# Patient Record
Sex: FEMALE | Race: WHITE
[De-identification: names, ages, dates, MRNs, and addresses within clinical notes are randomized per-mention and may not be internally consistent; named-entity substitution may affect disease eponyms.]

---

## 2017-02-19 ENCOUNTER — HOSPITAL ENCOUNTER (EMERGENCY)
Dept: HOSPITAL 22 - UTC | Age: 52
Discharge: HOME | End: 2017-02-19
Payer: COMMERCIAL

## 2017-02-19 VITALS — BODY MASS INDEX: 38.15 KG/M2 | WEIGHT: 229 LBS | HEIGHT: 65 IN

## 2017-02-19 VITALS — DIASTOLIC BLOOD PRESSURE: 81 MMHG | SYSTOLIC BLOOD PRESSURE: 125 MMHG

## 2017-02-19 DIAGNOSIS — F41.8: ICD-10-CM

## 2017-02-19 DIAGNOSIS — I10: ICD-10-CM

## 2017-02-19 DIAGNOSIS — J06.9: Primary | ICD-10-CM

## 2017-02-19 NOTE — [facility]ENTER REPORT
History of Present Issue
Date/Time Seen by Provider 17 2016
Visit Reason
Pt arrived:Walked    
Presenting Problem:C/O FEVERISH, CHILLS, N/V, HEADACHE X 1 DAY 
Location if Accident:
Onset of symptoms date/time:/ or onset unknown for:MEDICAL HX UNKNOWN
 
Have you (or family members/close friends) recently traveled outside the [place] 
[place]? N
If Yes, where/when: 
Have you had exposure to infectious disease within the past month?  TB? 
Other?  Specify: 
 
 
Patient states that she felt feverish, nausea vomiting and chills since 
yesterday that has got worse as the day went on today so she wanted to get 
checked
ALLERGIES
Coded Allergies:
NO KNOWN ALLERGIES (16)
 
Home Medications
Reported Medications
Pantoprazole Sodium (Protonix 40MG TAB*****) 40 MG PO BID 
CITALOPRAM HYDROBROMIDE (Citalopram HBr) 10 MG PO DAILY 
Gabapentin (Gabapentin 600MG*****) 600 MG PO DAILY 
PHENTERMINE/TOPIRAMATE (Qsymia 11.25 MG-69 MG Capsule) 1 CER PO DAILY 
Lisinopril (Lisinopril 40MG*****) 40 MG PO DAILY 
 
 
 
History
 
Medical History
General
   Angina: No
   MI: No
   Hypertension? Yes
   Hyperlipidemia? Yes
   CHF? No
   COPD? No
   Asthma? No
   GERD? Yes
   Hernia? No
   CVA? No
   Seizures? No
   Diabetes? No
   UTI? Yes
   Stones? No
   GB Disease: No
   Hepatitis? No
   Cataracts? No
   Glaucoma? No
   MRSA? No
   TB? No
   Anxiety? Yes
   Depression? Yes
   Cancer? No
Immunization HX
   DT/Tetanus 12/4/10
   Flu THIS YR
   Pneumonia 5-10 YRS
Surgical Hx
Previous Surgery?Y  LT HAND SURGERY   LAP X 5   HYSTERECTOMY    X 2
TONSILS   COLONOSCOPY           
            
 
 
Family History
Family HX
   Diabetes No
   CAD No
   Hypertension Yes
   Hyperlipidemia Yes
   Cancer No
   TB No
 
Social History
Smoking Hx
   Smoker: Never Smoker
   Tobacco: No
Alcohol
   Alcohol: No
 
Review of Systems
All Other Systems Reviewed and Negative
ENT
ear pain, nose discharge, nose congestion, throat pain, throat swelling. 
Respiratory
cough
Gastrointestinal
nausea, vomiting
 
Physical Exam
Vital Signs
 Vital Signs
 
 
Date Time Temp Pulse Resp B/P Pulse O2 O2 Flow FiO2
 
     Ox Delivery Rate 
 
1957 99.7 140 26 125/81 97   
 
 
General Appearance appears ill, pale 
Ear, Nose, Throat sinus pain/drainage, nasal congestion, tonsillar exudate, 
tonsillar swelling
Respiratory Status
Yes: trachea midline, chest symmetrical, non tender chest.  No: respiratory 
distress. 
Cardiovascular normal exam, no peripheral edema, no gallop, no JVD
Neurologic alert, normal exam, oriented x 3
 
Medical Decision Making
 
LABS/Meds/Orders
Pt receiving controlled substance in ED? No
Results/Orders
 Orders
 
 
Procedure Date/time Status
 
[facility] FLU A,B 2006 Active
 
 
 
Departure
 
Departure
Time of Disposition 2019
Disposition DC Home or Self Care(routine)
Clinical Impression
Primary Impression: Upper respiratory infection
Qualifiers:  URI type: unspecified URI Qualified Code: J06.9 - Acute upper 
respiratory infection, unspecified
Condition STABLE
Referrals
BREANNA England MD (Family)
 
Patient Instructions DI for Cough -- Adult, DI for Nasal Congestion
Additional Instructions
Over the counter Motrin or Tylenol as needed for fever
Drink plenty fluids
Warm salt water gargles as needed for throat irritation
Follow up family doctor 
Discharge Counseling
   Counseled pt/family regarding diagnosis, test results, medications/RX, home 
care, follow up needs
Prescriptions
Current Visit Scripts
Azithromycin (Zithromycin (Z-MALIKA) 250MG Tab******) 250 MG PO DAILY 
     #6 TAB 
     TAKE TWO (2) TABLETS ON DAY 1, THEN ONE (1) TABLET
     DAY #2 THRU #5
 
Prednisone (Prednisone 20MG*****) 20 MG PO BID 
     #10 TAB 
 
Fluticasone Propionate (Flonase 50 Mcg Nasal [place]) 2 SPRAY NA DAILY 
     #1 BOT 
 
 
 
Electronically Signed by LARRY BELTRAN on 17 at 2023

## 2017-02-19 NOTE — [facility]ENT TRE[facility]ENTER REPORT
History of Present Issue
Date/Time Seen by Provider 17 2016
Visit Reason
Pt arrived:Walked    
Presenting Problem:C/O FEVERISH, CHILLS, N/V, HEADACHE X 1 DAY 
Location if Accident:
Onset of symptoms date/time:/ or onset unknown for:MEDICAL HX UNKNOWN
 
Have you (or family members/close friends) recently traveled outside the [place] 
[place]? N
If Yes, where/when: 
Have you had exposure to infectious disease within the past month?  TB? 
Other?  Specify: 
 
 
Patient states that she felt feverish, nausea vomiting and chills since 
yesterday that has got worse as the day went on today so she wanted to get 
checked
ALLERGIES
Coded Allergies:
NO KNOWN ALLERGIES (16)
 
Home Medications
Reported Medications
Pantoprazole Sodium (Protonix 40MG TAB*****) 40 MG PO BID 
CITALOPRAM HYDROBROMIDE (Citalopram HBr) 10 MG PO DAILY 
Gabapentin (Gabapentin 600MG*****) 600 MG PO DAILY 
PHENTERMINE/TOPIRAMATE (Qsymia 11.25 MG-69 MG Capsule) 1 CER PO DAILY 
Lisinopril (Lisinopril 40MG*****) 40 MG PO DAILY 
 
 
 
History
 
Medical History
General
   Angina: No
   MI: No
   Hypertension? Yes
   Hyperlipidemia? Yes
   CHF? No
   COPD? No
   Asthma? No
   GERD? Yes
   Hernia? No
   CVA? No
   Seizures? No
   Diabetes? No
   UTI? Yes
   Stones? No
   GB Disease: No
   Hepatitis? No
   Cataracts? No
   Glaucoma? No
   MRSA? No
   TB? No
   Anxiety? Yes
   Depression? Yes
   Cancer? No
Immunization HX
   DT/Tetanus 12/4/10
   Flu THIS YR
   Pneumonia 5-10 YRS
Surgical Hx
Previous Surgery?Y  LT HAND SURGERY   LAP X 5   HYSTERECTOMY    X 2
TONSILS   COLONOSCOPY           
            
 
 
Family History
Family HX
   Diabetes No
   CAD No
   Hypertension Yes
   Hyperlipidemia Yes
   Cancer No
   TB No
 
Social History
Smoking Hx
   Smoker: Never Smoker
   Tobacco: No
Alcohol
   Alcohol: No
 
Review of Systems
All Other Systems Reviewed and Negative
ENT
ear pain, nose discharge, nose congestion, throat pain, throat swelling. 
Respiratory
cough
Gastrointestinal
nausea, vomiting
 
Physical Exam
Vital Signs
 Vital Signs
 
 
Date Time Temp Pulse Resp B/P Pulse O2 O2 Flow FiO2
 
     Ox Delivery Rate 
 
1957 99.7 140 26 125/81 97   
 
 
General Appearance appears ill, pale 
Ear, Nose, Throat sinus pain/drainage, nasal congestion, tonsillar exudate, 
tonsillar swelling
Respiratory Status
Yes: trachea midline, chest symmetrical, non tender chest.  No: respiratory 
distress. 
Cardiovascular normal exam, no peripheral edema, no gallop, no JVD
Neurologic alert, normal exam, oriented x 3
 
Medical Decision Making
 
LABS/Meds/Orders
Pt receiving controlled substance in ED? No
Results/Orders
 Orders
 
 
Procedure Date/time Status
 
[facility] FLU A,B 2006 Active
 
 
 
Departure
 
Departure
Time of Disposition 2019
Disposition DC Home or Self Care(routine)
Clinical Impression
Primary Impression: Upper respiratory infection
Qualifiers:  URI type: unspecified URI Qualified Code: J06.9 - Acute upper 
respiratory infection, unspecified
Condition STABLE
Referrals
BREANNA England MD (Family)
 
Patient Instructions DI for Cough -- Adult, DI for Nasal Congestion
Additional Instructions
Over the counter Motrin or Tylenol as needed for fever
Drink plenty fluids
Warm salt water gargles as needed for throat irritation
Follow up family doctor 
Discharge Counseling
   Counseled pt/family regarding diagnosis, test results, medications/RX, home 
care, follow up needs
Prescriptions
Current Visit Scripts
Azithromycin (Zithromycin (Z-MALIKA) 250MG Tab******) 250 MG PO DAILY 
     #6 TAB 
     TAKE TWO (2) TABLETS ON DAY 1, THEN ONE (1) TABLET
     DAY #2 THRU #5
 
Prednisone (Prednisone 20MG*****) 20 MG PO BID 
     #10 TAB 
 
Fluticasone Propionate (Flonase 50 Mcg Nasal [place]) 2 SPRAY NA DAILY 
     #1 BOT 
 
 
 
Electronically Signed by LARRY BELTRAN on 17 at 2023

## 2017-03-16 ENCOUNTER — HOSPITAL ENCOUNTER (OUTPATIENT)
Dept: HOSPITAL 22 - LAB | Age: 52
End: 2017-03-16
Attending: FAMILY MEDICINE
Payer: COMMERCIAL

## 2017-03-16 DIAGNOSIS — R93.8: Primary | ICD-10-CM

## 2017-03-16 LAB
BUN: 11 MG/DL (ref 7–18)
GFR SERPLBLD BASED ON 1.73 SQ M-ARVRAT: 66 ML/MIN (ref 59–?)

## 2017-03-17 ENCOUNTER — HOSPITAL ENCOUNTER (OUTPATIENT)
Dept: HOSPITAL 22 - RAD | Age: 52
End: 2017-03-17
Attending: FAMILY MEDICINE
Payer: COMMERCIAL

## 2017-03-17 DIAGNOSIS — R92.8: Primary | ICD-10-CM

## 2017-03-20 NOTE — RADIOLOGY REPORT PS360
CT CHEST W/ CONTRAST
 
 
INDICATION: Follow-up abnormal CT scan of the lung bases
ABNORMAL CXR
ORDERING PHYSICIAN: BREANNA England MD
PATIENT AGE:  51 years
 
 
 
COMPARISON: 2/25/2017
 
TECHNIQUE:  Axial images are obtained with contrast. Sagittal and coronal
reformatted images are reviewed as well.
 
 
FINDINGS: Incidental note made of a 7 mm nodule the right lobe of the thyroid
gland and 7 mm nodule of the left lobe of the thyroid gland medially.
 
No mediastinal or hilar mass evident. Small left millimeters cystic area is
present in the right upper lobe laterally.
 
Minimal atelectatic or fibrotic changes are present involving the right upper
lobe posteriorly. There is minimal peripheral fibrotic or atelectatic change in
the right upper lobe laterally. Atelectatic changes are present in the right
lower lobe in the right lung base. There is some associated nodularity in this
region posteriorly and inferiorly. The consolidation/volume loss in the right
lung base has shown some improvement. The nodularity however persist. This could
be sequela from postinflammatory changes. One cannot exclude the possibility of
a developing pulmonary nodule. Continued follow-up is recommended. The left lung
is clear. No acute bony anomalies.
 
IMPRESSION:
1. Persistent but slightly improved right lower lobe consolidation/atelectatic
change. There is some persistent nodularity just along the distal aspect of this
region of atelectasis. While this could be postinflammatory in nature, one
cannot exclude the possibility of a pulmonary nodule. Continued follow-up is
recommended in 4-6 weeks to evaluate for stability or resolution.

## 2017-11-19 ENCOUNTER — HOSPITAL ENCOUNTER (EMERGENCY)
Dept: HOSPITAL 22 - UTC | Age: 52
Discharge: HOME | End: 2017-11-19
Payer: COMMERCIAL

## 2017-11-19 VITALS — SYSTOLIC BLOOD PRESSURE: 150 MMHG | DIASTOLIC BLOOD PRESSURE: 88 MMHG

## 2017-11-19 VITALS — WEIGHT: 225 LBS | HEIGHT: 65 IN | BODY MASS INDEX: 37.49 KG/M2

## 2017-11-19 DIAGNOSIS — I10: ICD-10-CM

## 2017-11-19 DIAGNOSIS — A08.4: Primary | ICD-10-CM

## 2017-11-19 DIAGNOSIS — E78.5: ICD-10-CM

## 2017-11-19 DIAGNOSIS — K21.9: ICD-10-CM

## 2017-11-19 DIAGNOSIS — J02.9: ICD-10-CM

## 2017-11-19 NOTE — EXTERNAL MEDICAL SUMMARY RPT
[employer] [employer] CCD
 Created on: 2017
 
REGINA IRAHETA
External Reference #: [id number]
: 63
Sex: Female
 
Demographics
 
 
 
 Address  [address] [place] [facility] [address] [facility] [address]
 
          
 
  [place] [id number]
 
 Preferred Language  English
 
 Marital Status  Unknown
 
 Yazidi Affiliation  Unknown
 
 Race  W
 
 Ethnic Group  Unknown
 
 
Author
 
 
 
 Author  [employer]
 
 Organization  [employer]
 
 Address  Unknown
 
 Phone  Unavailable
 
                                            
 
Purpose
                      Continuity of Care Document - 1900 through 2017

## 2017-11-19 NOTE — EXTERNAL MEDICAL SUMMARY RPT
[employer] CCD
 Created on: 2017
 
REGINA IRAHETA
External Reference #: [id number]
: 63
Sex: Female
 
Demographics
 
 
 
 Address  [address] [facility] [address]
 
          
 
  [place] [id number]
 
 Preferred Language  English
 
 Marital Status  Unknown
 
 Lutheran Affiliation  Unknown
 
 Race  W
 
 Ethnic Group  Unknown
 
 
Author
 
 
 
 Author  [employer]
 
 Organization  [employer]
 
 Address  Unknown
 
 Phone  Unavailable
 
                                            
 
Purpose
                      Continuity of Care Document - 1900 through 2017

## 2017-11-19 NOTE — EXTERNAL MEDICAL SUMMARY RPT
[facility] Patient Summary
 Created on: 2017
 
REGINA IRAHETA
External Reference #: [id number]
: 63
Sex: Female
 
Demographics
 
 
 
 Address  [address] [place], [place] [address]
 
 Home Phone  [phone]
 
 Preferred Language  Unknown
 
 Marital Status  Unknown
 
 Yazidi Affiliation  Unknown
 
 Race  Unknown
 
 Ethnic Group  Unknown
 
 
Author
 
 
 
 Author  TOBIN Jordan, TOBIN Jordan 
 
 Organization  [employer]
 
 Address  Unknown
 
 Phone  Unavailable

## 2017-11-19 NOTE — EXTERNAL MEDICAL SUMMARY RPT
[employer] [facility]
 Created on: 2017
 
REGINA IRAHETA
External Reference #: [id number]
: 63
Sex: Female
 
Demographics
 
 
 
 Address  [address] [place] [facility] [address]
 
          
 
  [place]  [address]
 
 Home Phone  [phone]
 
 Preferred Language  Unknown
 
 Marital Status  Unknown
 
 Yazidism Affiliation  Unknown
 
 Race  White
 
 Ethnic Group  Unknown
 
 
Author
 
 
 
 Author            ,            TOBIN RUDD
 
 Address  Unknown
 
 Phone  [email or web address]
 
                                            
 
Purpose
                      Continuity of Care Document - 1900 through 2017

## 2017-11-19 NOTE — EXTERNAL MEDICAL SUMMARY RPT
[facility] [employer] [facility]
 Created on: 2017
 
REGINA IRAHETA
External Reference #: [id number]
: 63
Sex: Female
 
Demographics
 
 
 
 Address  [address]  [place]  [address]
 
 Home Phone  [phone]
 
 Preferred Language  English
 
 Marital Status  Unknown
 
 Shinto Affiliation  Unknown
 
 Race  Unknown
 
 Ethnic Group  Unknown
 
 
Author
 
 
 
 Author            ,            SUZANNA RUDD
 
 Address  Unknown
 
 Phone  [email or web address]
 
                                                                
 
Immunization
                      
 
 
 Name  Date  Rout  CVX   Reac  Dose  Comm  Prov  Is   Faci
 
          e           tion        ent   ider  Refu  lity
 
       Give                                      sed       
 
       n                                                   
 
                                                           
 
                                                   
 
                           
 
               
 
 Infl  09-2  Intr              0.5   Hist  D049  No    D049
 
 uenz  0-20  amus              mL    oric  01          01  
 
 a   17    cula                    al                   
 
 Quad        r                       Info                  
 
                                  rmat                  
 
 W/Pr                                ion            
 
 es                             -    
 
                          Sour   
 
                      ce    
 
            Unsp   
 
            ecif   
 
       ied

## 2017-11-19 NOTE — URGENT TREATMENT CENTER REPORT
History of Present Issue
Date/Time Seen by Provider 17
Visit Reason
Pt arrived:Walked    
Presenting Problem:PT C/O SORE THROAT, VOMITING, DIARRHEA, AND HA 
Location if Accident:
Onset of symptoms date/time:/ or onset unknown for:MEDICAL HX UNKNOWN
 
Have you (or family members/close friends) recently traveled outside the [place] 
[place]? N
If Yes, where/when: 
Have you had exposure to infectious disease within the past month?  TB? 
Other?  Specify: 
 
 
Patient state that she has been having headache nausea, vomiting and diarrhea 
since this morning State that as the day went on she continued to feel worse. 
State that now her throat feels raw and hurts when she tries to swallow. State 
that she wanted to come in and get something for the nausea before she ended up 
dehydrated
ALLERGIES
Coded Allergies:
No Known Allergies (17)
 
Home Medications
Active Scripts
HYOSCYAMINE SULFATE (Anaspaz) 0.125 MG SL TID 
     #30 TAB Ref 2
     Prov:      17
Rivaroxaban (Xarelto) 20 MG PO DAILY 
     #30 TAB Ref 3
     Prov:      17
Fluticasone Propionate (Flonase 50 Mcg Nasal [place]) 2 SPRAY NA DAILY 
     #1 BOT 
     Prov:      17
 
Reported Medications
Pantoprazole Sodium (Protonix 40MG TAB*****) 40 MG PO BID 
Zolpidem Tartrate (Ambien 10MG*****) 10 MG PO QHS 
Celecoxib (Celebrex 200MG*****) 200 MG PO DAILY 
CITALOPRAM HYDROBROMIDE (Citalopram HBr) 20 MG PO DAILY 
Atorvastatin Calcium (Atorvastatin 40MG*****) 40 MG PO QHS 
Gabapentin (Neurontin) 200 MG PO TID 
LISINOPRIL/HYDROCHLOROTHIAZIDE (Lisinopril-Hctz 10-12.5 MG Tab) 1 TAB PO DAILY 
Montelukast Sodium (Singulair 10MG*****) 10 MG PO DAILY 
 
 
 
History
 
Medical History
General
   CAD? No
   Angina: No
   MI: No
   Hypertension? Yes
   Hyperlipidemia? Yes
   CHF? No
   DVT? No
   PE? No
   COPD? No
   Asthma? No
   Anemia? No
   GERD? Yes
   Gastric ulcers? No
   GI Bleed? No
   Hernia? No
   Thyroid Problems? No
   Hypothyroidism? No
   CVA? No
   Seizures? No
   Diabetes? No
   Renal Insuffiency? No
   UTI? Yes
   Stones? No
   BPH? No
   GB Disease: No
   Nephritic Syndrome? No
   Asplenia? No
   Hepatitis? No
   Sickle Cell Disease? No
   Arthritis? No
   Migraines? No
   Cataracts? No
   Glaucoma? No
   MRSA? No
   HIV? No
   TB? No
   Anxiety? Yes
   Depression? Yes
   Cancer? No
   Additional hx:
1. Chronic back pain
 
Immunization HX
   DT/Tetanus 12/4/10
   Flu - Flu Season
   Pneumonia Refuses
Surgical Hx
Previous Surgery?Y  LT HAND SURGERY   LAP X 5   HYSTERECTOMY    X 2
TONSILS   COLONOSCOPY           
            
 
 
Family History
Family HX
   Diabetes No
   CAD No
   Hypertension Yes
   Hyperlipidemia Yes
   Cancer No
   TB No
 
Social History
Smoking Hx
   Smoker: Never Smoker
   Tobacco: No
   Packs/day N/A
Alcohol
   Alcohol: No
 
Review of Systems
All Other Systems Reviewed and Negative
Constitutional
chills, fever
ENT
throat pain. 
Respiratory
cough
Gastrointestinal
diarrhea, nausea, vomiting
 
Physical Exam
Vital Signs
 Vital Signs
 
 
Date Time Temp Pulse Resp B/P Pulse O2 O2 Flow FiO2
 
     Ox Delivery Rate 
 
1945 97.9 92 18 150/88 96   
 
 
General Appearance Patient appears ill, pale in color sitting on exam chair
Ear, Nose, Throat THroat red, raw irritated drainage noted in back of throat
Respiratory Status
Yes: trachea midline, chest symmetrical, non tender chest.  No: respiratory 
distress. 
Lung Sounds
bilateral: normal breath sounds, lungs clear. 
Cardiovascular normal exam, regular rate/rhythm, no peripheral edema
Neurologic alert, normal exam, oriented x 3
 
Medical Decision Making
 
LABS/Meds/Orders
Pt receiving controlled substance in ED? No
Results/Orders
 Current Medication Orders
 
 
  Sig/Eliz Start time  Last
 
Medication Dose Route Stop Time Status Admin
 
Sodium Chloride 500 ML .Q1H 2045 AC 
 
  IV 
 
Sodium Chloride 10 ML PRN PRN 2045 AC 
 
  IV 2041  
 
Sodium Chloride 500 ML .STK-MED ONE 2031 DC 
 
  IV   
 
Ondansetron HCl 8 MG ONCE ONE 2015 DC 
 
  IV 
 
Ondansetron HCl 0 .STK-MED ONE 2005 DC 
 
  .ROUTE   
 
Sodium Chloride 10 ML PRN PRN 2000 AC 
 
  IV 1953  
 
Sodium Chloride 500 ML .Q1H 2000 AC 
 
  IV 
 
Sodium Chloride 10 ML PRN PRN 2000 AC 
 
  IV 1954  
 
Sodium Chloride 500 ML .STK-MED ONE 1957 DC 
 
  IV   
 
 
 Orders
 
 
Procedure Date/time Status
 
IV SALINE LOCK 1954 Active
 
 
 
Progress
[facility] Progress Notes 1 
   Time 
   Comment
Patient given 8mg of zofran and 500cc bolus of NS states that she is feeling a 
little better Ordered second bolus of 500cc 
[facility] Progress Notes 2 
   Comment
After second 500 cc bolus patient sitting up in exam chair sipping on water, no 
vomiting since arrival patinet being dc'd home with family color improved lips 
moist patient state feels better
 
Departure
 
Departure
Time of Disposition 
Disposition DC Home or Self Care(routine)
Clinical Impression
Primary Impression: Gastroenteritis
Secondary Impressions: Acute pharyngitis
Qualifiers:  Pharyngitis/tonsillitis etiology: unspecified etiology Qualified 
Code: J02.9 - Acute pharyngitis, unspecified
Condition STABLE
Referrals
BREANNA England MD (Family)
 
Patient Instructions Dehydration, DI for Dehydration -- Adult, DI for Nausea -- 
Adult, DI for Vomiting -- Adult, Sore Throat
Additional Instructions
* No sign of bacterial infection. Likely viral. Virus can take 7-14 days to run 
their course
*Nasal saline and bulb syringe or nose sofía to remove nasal drainage and help 
with nasal congestion. Hard to eat, drink, or sleep with nasal congestion so 
important to keep nose cleaned out.
* Monitor Temp. Tylenol and/or Ibuprofen as needed. ER if fever is no less than 
101 despite alternating Tylenol and Ibuprofen
* Encourage fluids, water, Gatorade, powerade, pedialyte if infant/toddler/or 
child
* Warm salt water gargles for throat irritation
*Warm fluids 
*Sore throat lozenges
*Sleep elevated
*humidifier or vaporizer
Lots of rest
Increase fluids, water, Gatorade, powerade
*Your throat swab was sent to lab for culture. Those results area typically sent
to your primary care physician. Be sure to follow up in 2-3 days if no 
improvement so they can review those results and treat if necessary If you dont
have primary care I recommend you get one, but in the mean time you will have to
return to a walk in clinic
** Follow up IMMEDIATELY for new or worsening of symptoms OR no noticeable 
improvement over the next 48-72 hours. 911 immediately for any life threatening 
symptoms such as chest pain or difficulty breathing
Discharge Counseling
   Counseled pt/family regarding diagnosis, test results, medications/RX, home 
care, follow up needs
Prescriptions
Current Visit Scripts
Amoxicillin Trihydrate (Amoxicillin 500MG*****) 500 MG PO TID 
     #30 CAP 
 
Ondansetron (Zofran 4MG Odt*****) 4 MG PO Q6HP PRN NAUSEA AND VOMITING
     #20 TAB 
 
 
 
Electronically Signed by LARRY BELTRAN on 17 at 6587

## 2017-11-19 NOTE — EXTERNAL MEDICAL SUMMARY RPT
[facility] Patient Summary
 Created on: 2017
 
REGINA IRAHETA
External Reference #: [id number]
: 63
Sex: Female
 
Demographics
 
 
 
 Address  [address] [place], [place] [address]
 
 Home Phone  [phone]
 
 Preferred Language  Unknown
 
 Marital Status  Unknown
 
 Christianity Affiliation  Unknown
 
 Race  Unknown
 
 Ethnic Group  Unknown
 
 
Author
 
 
 
 Author  TOBIN Jordan, TOBIN Jordan 
 
 Organization  [employer]
 
 Address  Unknown
 
 Phone  Unavailable

## 2017-11-19 NOTE — EXTERNAL MEDICAL SUMMARY RPT
[facility] [employer] [facility]
 Created on: 2017
 
REGINA IRAHETA
External Reference #: [id number]
: 63
Sex: Female
 
Demographics
 
 
 
 Address  [address]  [place]  [address]
 
 Home Phone  [phone]
 
 Preferred Language  English
 
 Marital Status  Unknown
 
 Christianity Affiliation  Unknown
 
 Race  Unknown
 
 Ethnic Group  Unknown
 
 
Author
 
 
 
 Author            ,            SUZANNA RUDD
 
 Address  Unknown
 
 Phone  [email or web address]
 
                                                                
 
Immunization
                      
 
 
 Name  Date  Rout  CVX   Reac  Dose  Comm  Prov  Is   Faci
 
          e           tion        ent   ider  Refu  lity
 
       Give                                      sed       
 
       n                                                   
 
                                                           
 
                                                   
 
                           
 
               
 
 Infl  09-2  Intr              0.5   Hist  D049  No    D049
 
 uenz  0-20  amus              mL    oric  01          01  
 
 a   17    cula                    al                   
 
 Quad        r                       Info                  
 
                                  rmat                  
 
 W/Pr                                ion            
 
 es                             -    
 
                          Sour   
 
                      ce    
 
            Unsp   
 
            ecif   
 
       ied

## 2017-11-19 NOTE — EXTERNAL MEDICAL SUMMARY RPT
[employer] [employer] [facility]
 Created on: 2017
 
REGINA IRAHETA
External Reference #: [id number]
: 63
Sex: Female
 
Demographics
 
 
 
 Address  [address] [place] [facility] [address]
 
          
 
  [place]  [address]
 
 Home Phone  [phone]
 
 Preferred Language  Unknown
 
 Marital Status  Unknown
 
 Pentecostalism Affiliation  Unknown
 
 Race  White
 
 Ethnic Group  Unknown
 
 
Author
 
 
 
 Author            ,            TOBIN RUDD
 
 Address  Unknown
 
 Phone  [email or web address]
 
                                            
 
Purpose
                      Continuity of Care Document - 1900 through 2017

## 2017-11-19 NOTE — [facility] REPORT
History of Present Issue
Date/Time Seen by Provider 17
Visit Reason
Pt arrived:Walked    
Presenting Problem:PT C/O SORE THROAT, VOMITING, DIARRHEA, AND HA 
Location if Accident:
Onset of symptoms date/time:/ or onset unknown for:MEDICAL HX UNKNOWN
 
Have you (or family members/close friends) recently traveled outside the [place] 
[place]? N
If Yes, where/when: 
Have you had exposure to infectious disease within the past month?  TB? 
Other?  Specify: 
 
 
Patient state that she has been having headache nausea, vomiting and diarrhea 
since this morning State that as the day went on she continued to feel worse. 
State that now her throat feels raw and hurts when she tries to swallow. State 
that she wanted to come in and get something for the nausea before she ended up 
dehydrated
ALLERGIES
Coded Allergies:
No Known Allergies (17)
 
Home Medications
Active Scripts
HYOSCYAMINE SULFATE (Anaspaz) 0.125 MG SL TID 
     #30 TAB Ref 2
     Prov:      17
Rivaroxaban (Xarelto) 20 MG PO DAILY 
     #30 TAB Ref 3
     Prov:      17
Fluticasone Propionate (Flonase 50 Mcg Nasal [place]) 2 SPRAY NA DAILY 
     #1 BOT 
     Prov:      17
 
Reported Medications
Pantoprazole Sodium (Protonix 40MG TAB*****) 40 MG PO BID 
Zolpidem Tartrate (Ambien 10MG*****) 10 MG PO QHS 
Celecoxib (Celebrex 200MG*****) 200 MG PO DAILY 
CITALOPRAM HYDROBROMIDE (Citalopram HBr) 20 MG PO DAILY 
Atorvastatin Calcium (Atorvastatin 40MG*****) 40 MG PO QHS 
Gabapentin (Neurontin) 200 MG PO TID 
LISINOPRIL/HYDROCHLOROTHIAZIDE (Lisinopril-Hctz 10-12.5 MG Tab) 1 TAB PO DAILY 
Montelukast Sodium (Singulair 10MG*****) 10 MG PO DAILY 
 
 
 
History
 
Medical History
General
   CAD? No
   Angina: No
   MI: No
   Hypertension? Yes
   Hyperlipidemia? Yes
   CHF? No
   DVT? No
   PE? No
   COPD? No
   Asthma? No
   Anemia? No
   GERD? Yes
   Gastric ulcers? No
   GI Bleed? No
   Hernia? No
   Thyroid Problems? No
   Hypothyroidism? No
   CVA? No
   Seizures? No
   Diabetes? No
   Renal Insuffiency? No
   UTI? Yes
   Stones? No
   BPH? No
   GB Disease: No
   Nephritic Syndrome? No
   Asplenia? No
   Hepatitis? No
   Sickle Cell Disease? No
   Arthritis? No
   Migraines? No
   Cataracts? No
   Glaucoma? No
   MRSA? No
   HIV? No
   TB? No
   Anxiety? Yes
   Depression? Yes
   Cancer? No
   Additional hx:
1. Chronic back pain
 
Immunization HX
   DT/Tetanus 12/4/10
   Flu - Flu Season
   Pneumonia Refuses
Surgical Hx
Previous Surgery?Y  LT HAND SURGERY   LAP X 5   HYSTERECTOMY    X 2
TONSILS   COLONOSCOPY           
            
 
 
Family History
Family HX
   Diabetes No
   CAD No
   Hypertension Yes
   Hyperlipidemia Yes
   Cancer No
   TB No
 
Social History
Smoking Hx
   Smoker: Never Smoker
   Tobacco: No
   Packs/day N/A
Alcohol
   Alcohol: No
 
Review of Systems
All Other Systems Reviewed and Negative
Constitutional
chills, fever
ENT
throat pain. 
Respiratory
cough
Gastrointestinal
diarrhea, nausea, vomiting
 
Physical Exam
Vital Signs
 Vital Signs
 
 
Date Time Temp Pulse Resp B/P Pulse O2 O2 Flow FiO2
 
     Ox Delivery Rate 
 
1945 97.9 92 18 150/88 96   
 
 
General Appearance Patient appears ill, pale in color sitting on exam chair
Ear, Nose, Throat THroat red, raw irritated drainage noted in back of throat
Respiratory Status
Yes: trachea midline, chest symmetrical, non tender chest.  No: respiratory 
distress. 
Lung Sounds
bilateral: normal breath sounds, lungs clear. 
Cardiovascular normal exam, regular rate/rhythm, no peripheral edema
Neurologic alert, normal exam, oriented x 3
 
Medical Decision Making
 
LABS/Meds/Orders
Pt receiving controlled substance in ED? No
Results/Orders
 Current Medication Orders
 
 
  Sig/Eliz Start time  Last
 
Medication Dose Route Stop Time Status Admin
 
Sodium Chloride 500 ML .Q1H 2045 AC 
 
  IV 
 
Sodium Chloride 10 ML PRN PRN 2045 AC 
 
  IV 2041  
 
Sodium Chloride 500 ML .STK-MED ONE 2031 DC 
 
  IV   
 
Ondansetron HCl 8 MG ONCE ONE 2015 DC 
 
  IV 
 
Ondansetron HCl 0 .STK-MED ONE 2005 DC 
 
  .ROUTE   
 
Sodium Chloride 10 ML PRN PRN 2000 AC 
 
  IV 1953  
 
Sodium Chloride 500 ML .Q1H 2000 AC 
 
  IV 
 
Sodium Chloride 10 ML PRN PRN 2000 AC 
 
  IV 1954  
 
Sodium Chloride 500 ML .STK-MED ONE 1957 DC 
 
  IV   
 
 
 Orders
 
 
Procedure Date/time Status
 
IV SALINE LOCK 1954 Active
 
 
 
Progress
[facility] Progress Notes 1 
   Time 
   Comment
Patient given 8mg of zofran and 500cc bolus of NS states that she is feeling a 
little better Ordered second bolus of 500cc 
[facility] Progress Notes 2 
   Comment
After second 500 cc bolus patient sitting up in exam chair sipping on water, no 
vomiting since arrival patinet being dc'd home with family color improved lips 
moist patient state feels better
 
Departure
 
Departure
Time of Disposition 
Disposition DC Home or Self Care(routine)
Clinical Impression
Primary Impression: Gastroenteritis
Secondary Impressions: Acute pharyngitis
Qualifiers:  Pharyngitis/tonsillitis etiology: unspecified etiology Qualified 
Code: J02.9 - Acute pharyngitis, unspecified
Condition STABLE
Referrals
BREANNA England MD (Family)
 
Patient Instructions Dehydration, DI for Dehydration -- Adult, DI for Nausea -- 
Adult, DI for Vomiting -- Adult, Sore Throat
Additional Instructions
* No sign of bacterial infection. Likely viral. Virus can take 7-14 days to run 
their course
*Nasal saline and bulb syringe or nose sofía to remove nasal drainage and help 
with nasal congestion. Hard to eat, drink, or sleep with nasal congestion so 
important to keep nose cleaned out.
* Monitor Temp. Tylenol and/or Ibuprofen as needed. ER if fever is no less than 
101 despite alternating Tylenol and Ibuprofen
* Encourage fluids, water, Gatorade, powerade, pedialyte if infant/toddler/or 
child
* Warm salt water gargles for throat irritation
*Warm fluids 
*Sore throat lozenges
*Sleep elevated
*humidifier or vaporizer
Lots of rest
Increase fluids, water, Gatorade, powerade
*Your throat swab was sent to lab for culture. Those results area typically sent
to your primary care physician. Be sure to follow up in 2-3 days if no 
improvement so they can review those results and treat if necessary If you dont
have primary care I recommend you get one, but in the mean time you will have to
return to a walk in clinic
** Follow up IMMEDIATELY for new or worsening of symptoms OR no noticeable 
improvement over the next 48-72 hours. 911 immediately for any life threatening 
symptoms such as chest pain or difficulty breathing
Discharge Counseling
   Counseled pt/family regarding diagnosis, test results, medications/RX, home 
care, follow up needs
Prescriptions
Current Visit Scripts
Amoxicillin Trihydrate (Amoxicillin 500MG*****) 500 MG PO TID 
     #30 CAP 
 
Ondansetron (Zofran 4MG Odt*****) 4 MG PO Q6HP PRN NAUSEA AND VOMITING
     #20 TAB 
 
 
 
Electronically Signed by LARRY BELTRAN on 17 at 8080

## 2018-06-08 ENCOUNTER — HOSPITAL ENCOUNTER (OUTPATIENT)
Age: 53
End: 2018-06-08
Payer: COMMERCIAL

## 2018-06-08 DIAGNOSIS — M85.89: Primary | ICD-10-CM

## 2018-06-08 DIAGNOSIS — Z13.820: ICD-10-CM

## 2018-06-08 DIAGNOSIS — N60.19: ICD-10-CM

## 2018-06-08 PROCEDURE — 77080 DXA BONE DENSITY AXIAL: CPT

## 2018-06-08 PROCEDURE — 77067 SCR MAMMO BI INCL CAD: CPT

## 2018-09-26 ENCOUNTER — HOSPITAL ENCOUNTER (OUTPATIENT)
Age: 53
End: 2018-09-26
Payer: COMMERCIAL

## 2018-09-26 DIAGNOSIS — R07.9: Primary | ICD-10-CM

## 2018-09-26 DIAGNOSIS — R55: ICD-10-CM

## 2018-09-26 PROCEDURE — 93017 CV STRESS TEST TRACING ONLY: CPT

## 2018-09-26 PROCEDURE — 93880 EXTRACRANIAL BILAT STUDY: CPT

## 2018-09-27 ENCOUNTER — HOSPITAL ENCOUNTER (OUTPATIENT)
Age: 53
End: 2018-09-27
Payer: COMMERCIAL

## 2018-09-27 DIAGNOSIS — R06.02: Primary | ICD-10-CM

## 2018-09-27 DIAGNOSIS — Z86.718: ICD-10-CM

## 2018-09-27 PROCEDURE — 93971 EXTREMITY STUDY: CPT

## 2018-09-27 PROCEDURE — 71250 CT THORAX DX C-: CPT

## 2018-10-01 ENCOUNTER — HOSPITAL ENCOUNTER (OUTPATIENT)
Age: 53
End: 2018-10-01
Payer: COMMERCIAL

## 2018-10-01 DIAGNOSIS — R06.02: ICD-10-CM

## 2018-10-01 DIAGNOSIS — R41.3: Primary | ICD-10-CM

## 2018-10-01 DIAGNOSIS — G45.9: ICD-10-CM

## 2018-10-01 PROCEDURE — 70450 CT HEAD/BRAIN W/O DYE: CPT

## 2018-10-01 PROCEDURE — 93306 TTE W/DOPPLER COMPLETE: CPT

## 2019-09-18 ENCOUNTER — HOSPITAL ENCOUNTER (OUTPATIENT)
Dept: HOSPITAL 22 - RAD | Age: 54
End: 2019-09-18
Payer: COMMERCIAL

## 2019-09-18 DIAGNOSIS — R60.0: Primary | ICD-10-CM

## 2019-09-18 PROCEDURE — 93971 EXTREMITY STUDY: CPT

## 2021-08-30 ENCOUNTER — OFFICE VISIT (OUTPATIENT)
Dept: NEUROSURGERY | Facility: CLINIC | Age: 56
End: 2021-08-30

## 2021-08-30 VITALS
HEART RATE: 81 BPM | BODY MASS INDEX: 48.82 KG/M2 | HEIGHT: 65 IN | OXYGEN SATURATION: 96 % | TEMPERATURE: 97.1 F | WEIGHT: 293 LBS

## 2021-08-30 DIAGNOSIS — M48.062 SPINAL STENOSIS, LUMBAR REGION, WITH NEUROGENIC CLAUDICATION: ICD-10-CM

## 2021-08-30 DIAGNOSIS — G57.11 MERALGIA PARAESTHETICA, RIGHT: Primary | ICD-10-CM

## 2021-08-30 PROCEDURE — 99204 OFFICE O/P NEW MOD 45 MIN: CPT | Performed by: PHYSICIAN ASSISTANT

## 2021-08-30 RX ORDER — CARIPRAZINE 1.5 MG/1
1.5 CAPSULE, GELATIN COATED ORAL DAILY
COMMUNITY
Start: 2021-08-11

## 2021-08-30 RX ORDER — BISOPROLOL FUMARATE 10 MG/1
10 TABLET, FILM COATED ORAL DAILY
COMMUNITY
Start: 2021-07-02

## 2021-08-30 RX ORDER — ALPRAZOLAM 0.5 MG/1
0.5 TABLET ORAL 3 TIMES DAILY
COMMUNITY
Start: 2021-08-14

## 2021-08-30 RX ORDER — RIVAROXABAN 20 MG/1
20 TABLET, FILM COATED ORAL DAILY
COMMUNITY
Start: 2021-07-06

## 2021-08-30 RX ORDER — HYDROXYZINE PAMOATE 50 MG/1
50 CAPSULE ORAL DAILY
COMMUNITY
Start: 2021-08-05

## 2021-08-30 RX ORDER — GABAPENTIN 100 MG/1
100 CAPSULE ORAL
COMMUNITY
Start: 2021-08-18 | End: 2021-11-08

## 2021-08-30 RX ORDER — FUROSEMIDE 40 MG/1
40 TABLET ORAL 2 TIMES DAILY
COMMUNITY
End: 2021-10-12 | Stop reason: DRUGHIGH

## 2021-08-30 RX ORDER — TRAMADOL HYDROCHLORIDE 50 MG/1
50 TABLET ORAL 3 TIMES DAILY
COMMUNITY
Start: 2021-07-01 | End: 2022-02-14

## 2021-08-30 RX ORDER — POTASSIUM CHLORIDE 20 MEQ/1
20 TABLET, EXTENDED RELEASE ORAL DAILY
COMMUNITY
Start: 2021-08-04

## 2021-08-30 RX ORDER — DOXEPIN HYDROCHLORIDE 50 MG/1
50 CAPSULE ORAL DAILY
COMMUNITY
Start: 2021-08-23

## 2021-08-30 NOTE — PROGRESS NOTES
Patient: Keyla Davila  : 1965  Gender: female    Primary Care Provider: Eloy Estrada MD    Requesting Provider: As above    Chief Complaint:   Chief Complaint   Patient presents with   • Thoracic and lower back pain   • Bilateral leg numbness/tingling       History of Present Illness:  Keyla Davila is a 56-year-old woman who presents today for evaluation of low back pain.  Patient notes a longstanding history of back pain that worsened over the last 4-5 months.  She denies inciting event.  Pain is in her left side lumbar region and occasionally radiates upward to her thoracolumbar junction.  Her back pain is worse with sitting, standing and walking.  Occasionally when walking she notes a burning type pain of her lateral right thigh.  She otherwise denies pain or focal weakness in her lower extremities.  She denies bowel or bladder difficulties.  She has been through physical therapy which ultimately made her symptoms worse.  She takes tramadol, Xanax, muscle relaxers and gabapentin.  She does not feel these medications significantly help her symptoms.  She currently cares for her young grandchildren and reports gaining a lot of weight after the loss of her young daughter.  She does feel that this may have contributed to her symptoms.  Her medical history significant for morbid obesity.  She does not use tobacco.  She presents today with a lumbar MRI.      Past Medical and Surgical History:  Past Medical History:   Diagnosis Date   • Arthritis    • Back problem    • Hx of bronchitis    • Hx of viral pneumonia    • Hypertension      Past Surgical History:   Procedure Laterality Date   •  SECTION  1987,    • HAND SURGERY Left    • HYSTERECTOMY     • LAPAROTOMY OVARIAN CYSTECTOMY     • TONSILLECTOMY         Current Medications:    Current Outpatient Medications:   •  ALPRAZolam (XANAX) 0.5 MG tablet, Take 0.5 mg by mouth 3 (Three) Times a Day., Disp: , Rfl:   •   "bisoprolol (ZEBeta) 10 MG tablet, Take 10 mg by mouth 2 (two) times a day., Disp: , Rfl:   •  doxepin (SINEquan) 100 MG capsule, Take 100 mg by mouth 2 (two) times a day., Disp: , Rfl:   •  furosemide (LASIX) 40 MG tablet, Take 40 mg by mouth 2 (Two) Times a Day., Disp: , Rfl:   •  gabapentin (NEURONTIN) 100 MG capsule, Take 100 mg by mouth 6 (Six) Times a Day., Disp: , Rfl:   •  hydrOXYzine pamoate (VISTARIL) 50 MG capsule, Take 50 mg by mouth 4 (Four) Times a Day., Disp: , Rfl:   •  potassium chloride (K-DUR,KLOR-CON) 20 MEQ CR tablet, , Disp: , Rfl:   •  traMADol (ULTRAM) 50 MG tablet, Take 50 mg by mouth 3 (Three) Times a Day., Disp: , Rfl:   •  Trintellix 20 MG tablet, Take 20 mg by mouth Daily., Disp: , Rfl:   •  Vraylar 1.5 MG capsule capsule, Take 1.5 mg by mouth Daily., Disp: , Rfl:   •  Xarelto 20 MG tablet, Take 20 mg by mouth Daily., Disp: , Rfl:     Allergies:  No Known Allergies    Review of Systems:  Review of Systems   Constitutional: Positive for activity change, fatigue and unexpected weight gain.   HENT: Positive for dental problem.    Eyes: Positive for itching and visual disturbance.   Respiratory: Positive for chest tightness and shortness of breath.    Cardiovascular: Positive for leg swelling.   Musculoskeletal: Positive for back pain, joint swelling and neck stiffness.   Neurological: Positive for weakness, light-headedness, numbness and headache.   Psychiatric/Behavioral: Positive for decreased concentration, dysphoric mood, sleep disturbance and depressed mood. The patient is nervous/anxious.    All other systems reviewed and are negative.        Physical Examination:  Vitals:    08/30/21 1103   Pulse: 81   Temp: 97.1 °F (36.2 °C)   SpO2: 96%   Weight: 136 kg (299 lb 6.4 oz)   Height: 165.1 cm (65\")   Patient's Body mass index is 49.82 kg/m². indicating that she is morbidly obese (BMI > 40 or > 35 with obesity - related health condition). Obesity-related health conditions include the " following: hypertension. Obesity is worsening. BMI is is above average; BMI management plan is completed. We discussed portion control and increasing exercise..    Physical Exam  Constitutional:       General: She is not in acute distress.     Appearance: Normal appearance. She is not ill-appearing.   HENT:      Head: Normocephalic and atraumatic.   Musculoskeletal:         General: Normal range of motion.      Cervical back: Normal range of motion and neck supple.      Comments: 2+ pitting edema noted to bilateral lower extremities.  Venous stasis changes noted of distal right leg   Skin:     General: Skin is warm and dry.   Neurological:      General: No focal deficit present.      Mental Status: She is alert and oriented to person, place, and time.      Sensory: Sensation is intact.      Motor: Motor function is intact.      Coordination: Coordination is intact.      Gait: Gait is intact.      Deep Tendon Reflexes:      Reflex Scores:       Bicep reflexes are 1+ on the right side and 1+ on the left side.       Brachioradialis reflexes are 1+ on the right side and 1+ on the left side.       Patellar reflexes are 1+ on the right side and 1+ on the left side.       Achilles reflexes are 1+ on the right side and 1+ on the left side.     Comments: Her gait is slow, antalgic and slightly flexed forward at the waist  Strength is well intact with bilateral dorsiflexion, plantar flexion, knee extension hip flexion  Sensation is intact in bilateral lower extremities         Imaging Review:  Lumbar MRI performed 6/21/2021 demonstrates degenerative discs throughout.  L4-5 there is moderate canal and foraminal narrowing bilaterally.  At L5-S1 there is left greater than right foraminal narrowing.  Plain films demonstrate no evidence of instability    Assessment:  1.  Mechanical low back pain  2.  Lumbar stenosis  3.  Morbid obesity  4.  Right meralgia paresthetica    Plan:  Ms. Davila is seen today for evaluation of low back  pain.  Her imaging demonstrates multilevel degenerative disc disease with moderate canal stenosis at L4-5. Additionally I believe her symptoms are consistent with right meralgia paresthetica.  She is already taking gabapentin.  She has attempted physical therapy and medications and ultimately she feels her symptoms have not improved.  We had a long discussion regarding her weight moving forward.  She is understanding and does feel that her weight plays a role in her current pain level.  She is motivated to begin working on this.  I have placed a referral to pain management for consideration of lumbar injections.       I will see her back in 2-3 months to check on her progress.  She will call our office if she develops lower extremity pain or weakness, bowel or bladder difficulties.        Alissa Benavides PA-C

## 2021-09-02 ENCOUNTER — PATIENT ROUNDING (BHMG ONLY) (OUTPATIENT)
Dept: NEUROSURGERY | Facility: CLINIC | Age: 56
End: 2021-09-02

## 2021-09-02 NOTE — PROGRESS NOTES
Called Ms. Davila([phone]) and left a voice message welcoming her to our practice (provider Alissa Benavides, PAC 8-30-21) and left my direct line for her to call me back with any concerns about her initial visit or if we could do anything for her.    Teri Gibbs

## 2021-09-30 PROBLEM — M51.379 DEGENERATION OF LUMBAR OR LUMBOSACRAL INTERVERTEBRAL DISC: Status: ACTIVE | Noted: 2021-09-30

## 2021-09-30 PROBLEM — E66.01 MORBID OBESITY: Status: ACTIVE | Noted: 2021-09-30

## 2021-09-30 PROBLEM — M51.37 DEGENERATION OF LUMBAR OR LUMBOSACRAL INTERVERTEBRAL DISC: Status: ACTIVE | Noted: 2021-09-30

## 2021-09-30 PROBLEM — M48.062 LUMBAR STENOSIS WITH NEUROGENIC CLAUDICATION: Status: ACTIVE | Noted: 2021-09-30

## 2021-09-30 PROBLEM — M47.816 SPONDYLOSIS OF LUMBAR REGION WITHOUT MYELOPATHY OR RADICULOPATHY: Status: ACTIVE | Noted: 2021-09-30

## 2021-10-05 ENCOUNTER — TELEPHONE (OUTPATIENT)
Dept: PAIN MEDICINE | Facility: CLINIC | Age: 56
End: 2021-10-05

## 2021-10-05 NOTE — TELEPHONE ENCOUNTER
Caller: Keyla Davila    Relationship to patient: SELF    Best call back number:     Patient is needing: [facility] HAD NO SH[facility] DUE TO HAVING F2 SEPARATE FAMILY EMBERS PASSING AWAY & FORGOT TO CALL IN, FROM THE STRESS OF IT ALL.  I AT FIRST, GOT HER R/S TO NEXT AV SHE COULD DO WHICH WAS 11/09/2021.  AFTER CALL REALIZED DR MCKEON DOESN'T R/S NO SHOWS (SHE WAS DOWN FOR 1030 ON 10/05/2021).  SO, I CALLED PATIENT BACK TO ADVISE HER THE OFFICE POLICY AND WASN'T ABLE TO REACH HER, EITHER - [facility] FOR HER TO CALLBACK AND WE WOULD HAVE TO CHECK WITH OFFICE BEFORE RESCHEDULING HER.  PLEASE GIVE PATIENT A CALL BACK TO ADVISE OR R/S.

## 2021-10-05 NOTE — TELEPHONE ENCOUNTER
PLEASE DISREGARD - CRYSTAL FROM [facility] CALLED PATIENT WHILE WAS TYPING OUT RETURNED CALL INFO.

## 2021-10-05 NOTE — PROGRESS NOTES
"Chief Complaint: \"Lower back pain\"        History of Present Illness:   Patient: Ms. Keyla Davila, 56 y.o. female   Referring Physician: Alissa Benavides PA-C  Reason for Referral: Consultation for chronic intractable lower back pain.   Pain History:  Patient reports a longstanding history of mechanical lower back pain, which began without incident.  Patient reports that for the past 7 months, her pain has increased significantly.  She complains of lumbar pain with occasional radiation into the gluteal region and also the thoracolumbar junction. MRI of the lumbar spine on 6/21/2021 revealed diffuse degenerative disc and facet joint disease.  At L4-L5 there is moderate canal and bilateral foraminal stenosis.  At L5-S1, there is advanced degenerative disc and facet joint disease with left greater than right foraminal stenosis.  Patient has failed to obtain pain relief with conservative measures including oral analgesics, physical therapy (made pain worse), to name a few. Keyla Davila underwent neurosurgical consultation with Alissa Benavides PA-C on 08/30/2021, and was found not to be a surgical candidate. Pain has progressed in intensity over the past months.   Pain Description: Constant lower back pain with intermittent exacerbation, described as aching, burning and throbbing sensation.   Radiation of Pain: The pain radiates into the gluteal region  Pain intensity today: 8/10  Average pain intensity last week: 8/10  Pain intensity ranges from: 5/10 to 9/10  Aggravating factors: Pain increases with bending, twisting, standing, walking. Patient describes neurogenic claudication.    Alleviating factors: Pain decreases with sitting down, lying down   Associated Symptoms:   Patient denies numbness or weakness in the lower extremities. She reports some tingling in the lateral thighs  Patient denies any new bladder or bowel problems.   Patient reports difficulties with her balance but denies recent falls.     Review of " previous therapies and additional medical records:  Keyla Davila has already failed the following measures, including:   Conservative Measures: Oral analgesics, topical analgesics, ice, heat, massage, physical therapy   Interventional Measures: None  Surgical Measures: No history of previous lumbar spine or hip surgery   Keyla Davila underwent neurosurgical consultation with Alissa Benavides PA-C on 08/30/2021, and was found not to be a surgical candidate.  Keyla Davila presents with significant comorbidities including anxiety and depression, osteoarthritis, morbid obesity, hypertension, on Xarelto  In terms of current analgesics, Keyla Davila takes: Gabapentin, tramadol.  Patient also takes Xanax, doxepin, hydroxyzine, Trintellix, Vraylar  I have reviewed [facility] Report #[id number] (alprazolam Dr. Leo Moran) consistent with medication reconciliation.  SOAPP: Not completed by the patient    Global Pain Scale 10-05  2021          Pain 23          Feelings 22          Clinical outcomes 22          Activities 24          GPS Total: 91            Review of Diagnostic Studies:  I have reviewed the images with the patient as well as the reports, as follows;  MRI of the lumbar spine without contrast on 6/21/2021.  Vertebral body heights and alignment are preserved.  The conus medullaris is seen at L2 and has an unremarkable appearance.  Multilevel degenerative disc and facet joint disease.  Axial imaging:  T12-L1, L1-L2: Right paracentral disc protrusion with mild lateral recess and foraminal stenosis  L2-L3: Bulging disc, facet hypertrophy, ligamentum flavum hypertrophy contributing to mild bilateral foraminal stenosis  L3-L4: Disc bulge, facet hypertrophy with increased fluid signal in the facet joints, ligamentum flavum hypertrophy contributing to mild to moderate bilateral foraminal stenosis  L4-L5: Disc bulge, facet hypertrophy with increased fluid signal in the facet joints, ligamentum flavum hypertrophy  contributing to moderate bilateral lateral recess and neuroforaminal stenosis  L5-S1: Severe degenerative disc disease with hypertrophic endplate changes, facet and ligamentum flavum hypertrophy.  Moderate left neuroforaminal stenosis  X-rays of the lumbar spine on 2021.  Vertebral body heights and alignment are preserved.  Diffuse degenerative disc and facet joint disease from T12-L1 through L5-S1, most severe at L5-S1.    Review of Systems   Constitutional: Positive for activity change and fatigue.   Respiratory: Positive for shortness of breath.    Cardiovascular: Positive for leg swelling.   Musculoskeletal: Positive for arthralgias, back pain, myalgias, neck pain and neck stiffness.   Neurological: Positive for light-headedness and numbness.   Psychiatric/Behavioral: The patient is nervous/anxious (depression).    All other systems reviewed and are negative.        Patient Active Problem List   Diagnosis   • Spondylosis of lumbar region without myelopathy or radiculopathy   • Degeneration of lumbar or lumbosacral intervertebral disc   • Lumbar stenosis with neurogenic claudication   • Morbid obesity (HCC)   • Anxiety and depression   • Physical deconditioning   • Chronic anticoagulation   • Gait disturbance   • Lymphedema of both lower extremities   • Meralgia paraesthetica, right       Past Medical History:   Diagnosis Date   • Arthritis    • Back problem    • Hx of bronchitis    • Hx of viral pneumonia    • Hypertension          Past Surgical History:   Procedure Laterality Date   •  SECTION  1987,    • HAND SURGERY Left    • HYSTERECTOMY     • LAPAROTOMY OVARIAN CYSTECTOMY     • TONSILLECTOMY           Family History   Problem Relation Age of Onset   • Arthritis Mother    • Cancer Mother    • Heart disease Mother    • Hypertension Mother    • Liver disease Mother    • Kidney disease Mother    • Heart disease Father    • Hypertension Father    • Alcohol abuse Father    •  "Cirrhosis Father    • Drug abuse Daughter    • Cancer Maternal Grandfather    • Hearing loss Maternal Grandfather    • Hypertension Maternal Grandfather    • Cancer Paternal Grandfather          Social History     Socioeconomic History   • Marital status:    Tobacco Use   • Smoking status: Never Smoker   • Smokeless tobacco: Never Used   Vaping Use   • Vaping Use: Never used   Substance and Sexual Activity   • Alcohol use: Yes     Comment: Occ   • Drug use: Never   • Sexual activity: Defer           Current Outpatient Medications:   •  ALPRAZolam (XANAX) 0.5 MG tablet, Take 0.5 mg by mouth 3 (Three) Times a Day., Disp: , Rfl:   •  bisoprolol (ZEBeta) 10 MG tablet, Take 10 mg by mouth 2 (two) times a day., Disp: , Rfl:   •  doxepin (SINEquan) 100 MG capsule, Take 100 mg by mouth 2 (two) times a day., Disp: , Rfl:   •  furosemide (LASIX) 80 MG tablet, , Disp: , Rfl:   •  gabapentin (NEURONTIN) 100 MG capsule, Take 100 mg by mouth 6 (Six) Times a Day., Disp: , Rfl:   •  hydrOXYzine pamoate (VISTARIL) 50 MG capsule, Take 50 mg by mouth 4 (Four) Times a Day., Disp: , Rfl:   •  metOLazone (ZAROXOLYN) 2.5 MG tablet, , Disp: , Rfl:   •  potassium chloride (K-DUR,KLOR-CON) 20 MEQ CR tablet, , Disp: , Rfl:   •  traMADol (ULTRAM) 50 MG tablet, Take 50 mg by mouth 3 (Three) Times a Day., Disp: , Rfl:   •  Trintellix 20 MG tablet, Take 20 mg by mouth Daily., Disp: , Rfl:   •  Vraylar 1.5 MG capsule capsule, Take 1.5 mg by mouth Daily., Disp: , Rfl:   •  Xarelto 20 MG tablet, Take 20 mg by mouth Daily., Disp: , Rfl:       No Known Allergies      /82 (BP Location: Right arm, Patient Position: Sitting, Cuff Size: Adult)   Pulse 68   Temp 95.7 °F (35.4 °C)   Ht 165.1 cm (65\")   Wt 136 kg (298 lb 12.8 oz)   SpO2 94%   BMI 49.72 kg/m²       Physical Exam:  Constitutional: Patient appears morbidly obese well-hydrated  HEENT: Head: Normocephalic and atraumatic  Eyes: Conjunctivae and lids are normal  Pupils: Equal, " round, reactive to light  Neck: Trachea normal. Neck supple. No JVD present.   Peripheral vascular exam: Posterior tibialis: right 2+ and left 2+. Dorsalis pedis: right 2+ and left 2+. 2+ edema.   Musculoskeletal   Gait and station: Gait evaluation demonstrated a normal gait   Lumbar spine: Passive and active range of motion are limited secondary to pain. Extension, lateral flexion, rotation of the lumbar spine increased and reproduced pain. Lumbar facet joint loading maneuvers are positive.  Maxi test and Gaenslen's test are negative   Piriformis maneuvers are negative   Hip joints: The range of motion of the hip joints is slightly limited to flexion and internal rotation, symmetrical and without pain.   Neurological:   Patient is alert and oriented to person, place, and time.   Speech: Normal.   Cortical function: Normal mental status.   Reflex Scores:  Right patellar: 0+  Left patellar: 0+  Right Achilles: 0+  Left Achilles: 0+  Motor strength: 5/5  Motor Tone: Normal    Involuntary movements: None.   Superficial/Primitive Reflexes: Primitive reflexes were absent.   Right Herring: Absent  Left Herring: Absent  Right ankle clonus: Absent  Left ankle clonus: Absent   Babinsky: Absent  Long tract signs: Negative. Straight leg raising test: Negative.   Sensory exam: Intact to light touch, intact pain and temperature sensation, intact vibration sensation and normal proprioception.   Coordination: Normal finger to nose and heel to shin. Normal balance and negative Romberg's sign   Skin and subcutaneous tissue: Skin is warm and intact. No rash noted. No cyanosis.   Psychiatric: Judgment and insight: Normal. Recent and remote memory: Intact. Mood and affect: Normal.     ASSESSMENT:   1. Spondylosis of lumbar region without myelopathy or radiculopathy    2. Degeneration of lumbar or lumbosacral intervertebral disc    3. Lumbar stenosis with neurogenic claudication    4. Meralgia paraesthetica, right    5. Morbid  obesity (HCC)    6. Lymphedema of both lower extremities    7. Chronic anticoagulation    8. Anxiety and depression    9. Gait disturbance    10. Physical deconditioning          PLAN/MEDICAL DECISION MAKING:  Patient reports a longstanding history of mechanical lower back pain, which began without incident. Patient reports that for the past 7 months, her pain has increased significantly. She complains mechanical lumbar pain with radiation into the gluteal region. MRI of the lumbar spine on 6/21/2021 revealed diffuse degenerative disc and facet joint disease.  There is increased fluid signal in the facet joints from L3-L4 through L5-S1.  At L4-L5 there is moderate canal and bilateral foraminal stenosis.  At L5-S1, there is advanced degenerative disc and facet joint disease with left greater than right foraminal stenosis.  Patient has failed to obtain pain relief with conservative measures including oral analgesics, physical therapy (made pain worse), to name a few. Keyla Davila underwent neurosurgical consultation with Alissa Benavides PA-C on 08/30/2021, and was found not to be a surgical candidate. Pain has progressed in intensity over the past months.  Patient presents with significant comorbidity particularly morbid obesity.  I have reviewed all available patient's medical records as well as previous therapies as referenced above. I had a lengthy conversation with Ms. Keyla Davila regarding her chronic pain condition and potential therapeutic options including risks, benefits, alternative therapies, to name a few. Therefore, I have proposed the following plan:  1. Pharmacological measures: Reviewed and discussed; Patient takes gabapentin, tramadol.  Patient also takes Xanax, doxepin, hydroxyzine, Trintellix, Vraylar  2. Interventional pain management measures: Patient will be scheduled for diagnostic bilateral lumbar medial branch blocks at L3, L4, L5; for bilateral lumbar facet joints at L4-L5, L5-S1 to clarify  the origin of chronic refractory mechanical lower back pain. If patient experiences more than 80% relief along with significant improvement the range of motion of the lumbar spine, then, patient will be scheduled for a second set of diagnostic bilateral lumbar medial branch blocks, to then, proceed with bilateral lumbar medial branch rhizotomies. Otherwise, patient will need to stop rivaroxaban (Xarelto) at least 72 hours between last dose and procedure to proceed with diagnostic and therapeutic bilateral L4-L5 transforaminal epidural steroid injection. Patient also reports symptoms consistent with right meralgia paresthetica, for which we may consider diagnostic and therapeutic right lateral femoral cutaneous nerve block by catheter dissection technique under ultrasound and fluoroscopic guidance.  Patient will follow up with Alissa Benavides PA-C thereafter  3. Long-term rehabilitation efforts:  A. The patient does not have a history of falls. I did complete a risk assessment for falls  B. Patient will start a comprehensive physical therapy program for core strengthening, gait and balance training, neurodynamics, ultrasound, myofascial release, cupping, dry needling and Alter-G once pain is under control  C. Start an exercise program such as water therapy  D. Contrast therapy: Apply ice-packs for 15-20 minutes, followed by heating pads for 15-20 minutes to affected area   E. Referral to [facility]. Patient's Body mass index is 49 kg/m².  Patient counseled on the importance of weight loss to help with overall health and pain control. Patient instructed to attempt weight loss.    4. The patient has been instructed to contact my office with any questions or difficulties. The patient understands the plan and agrees to proceed accordingly.          Patient Care Team:  Eloy Estrada MD as PCP - General (Family Medicine)     No orders of the defined types were placed in this  encounter.        Future Appointments   Date Time Provider Department Center   11/8/2021 10:30 AM Alissa Benavides, [facility]         Yomi Razo MD     EMR Dragon/Transcription disclaimer:  Much of this encounter note is an electronic transcription of spoken language to printed text. Electronic transcription of spoken language may permit erroneous, or at times, nonsensical words or phrases to be inadvertently transcribed. Although I have reviewed the note for such errors, some may still exist.

## 2021-10-12 ENCOUNTER — OFFICE VISIT (OUTPATIENT)
Dept: PAIN MEDICINE | Facility: CLINIC | Age: 56
End: 2021-10-12

## 2021-10-12 VITALS
DIASTOLIC BLOOD PRESSURE: 82 MMHG | HEIGHT: 65 IN | TEMPERATURE: 95.7 F | WEIGHT: 293 LBS | SYSTOLIC BLOOD PRESSURE: 128 MMHG | HEART RATE: 68 BPM | BODY MASS INDEX: 48.82 KG/M2 | OXYGEN SATURATION: 94 %

## 2021-10-12 DIAGNOSIS — M48.062 LUMBAR STENOSIS WITH NEUROGENIC CLAUDICATION: ICD-10-CM

## 2021-10-12 DIAGNOSIS — F41.9 ANXIETY AND DEPRESSION: ICD-10-CM

## 2021-10-12 DIAGNOSIS — M47.816 SPONDYLOSIS OF LUMBAR REGION WITHOUT MYELOPATHY OR RADICULOPATHY: Primary | ICD-10-CM

## 2021-10-12 DIAGNOSIS — F32.A ANXIETY AND DEPRESSION: ICD-10-CM

## 2021-10-12 DIAGNOSIS — R53.81 PHYSICAL DECONDITIONING: ICD-10-CM

## 2021-10-12 DIAGNOSIS — M51.37 DEGENERATION OF LUMBAR OR LUMBOSACRAL INTERVERTEBRAL DISC: ICD-10-CM

## 2021-10-12 DIAGNOSIS — Z79.01 CHRONIC ANTICOAGULATION: ICD-10-CM

## 2021-10-12 DIAGNOSIS — G57.11 MERALGIA PARAESTHETICA, RIGHT: ICD-10-CM

## 2021-10-12 DIAGNOSIS — E66.01 MORBID OBESITY (HCC): ICD-10-CM

## 2021-10-12 DIAGNOSIS — M47.816 SPONDYLOSIS OF LUMBAR REGION WITHOUT MYELOPATHY OR RADICULOPATHY: ICD-10-CM

## 2021-10-12 DIAGNOSIS — I89.0 LYMPHEDEMA OF BOTH LOWER EXTREMITIES: ICD-10-CM

## 2021-10-12 DIAGNOSIS — R26.9 GAIT DISTURBANCE: ICD-10-CM

## 2021-10-12 PROCEDURE — 99204 OFFICE O/P NEW MOD 45 MIN: CPT | Performed by: ANESTHESIOLOGY

## 2021-10-12 RX ORDER — METOLAZONE 2.5 MG/1
TABLET ORAL
COMMUNITY
Start: 2021-09-15 | End: 2023-01-24 | Stop reason: ALTCHOICE

## 2021-10-12 RX ORDER — FUROSEMIDE 80 MG
TABLET ORAL
COMMUNITY
Start: 2021-10-06 | End: 2022-04-27

## 2021-10-18 ENCOUNTER — OUTSIDE FACILITY SERVICE (OUTPATIENT)
Dept: PAIN MEDICINE | Facility: CLINIC | Age: 56
End: 2021-10-18

## 2021-10-18 DIAGNOSIS — M47.816 SPONDYLOSIS OF LUMBAR REGION WITHOUT MYELOPATHY OR RADICULOPATHY: Primary | ICD-10-CM

## 2021-10-18 PROCEDURE — 64494 INJ PARAVERT F JNT L/S 2 LEV: CPT | Performed by: ANESTHESIOLOGY

## 2021-10-18 PROCEDURE — 99152 MOD SED SAME PHYS/QHP 5/>YRS: CPT | Performed by: ANESTHESIOLOGY

## 2021-10-18 PROCEDURE — 64493 INJ PARAVERT F JNT L/S 1 LEV: CPT | Performed by: ANESTHESIOLOGY

## 2021-10-19 ENCOUNTER — TELEPHONE (OUTPATIENT)
Dept: PAIN MEDICINE | Facility: CLINIC | Age: 56
End: 2021-10-19

## 2021-10-19 NOTE — TELEPHONE ENCOUNTER
Spoke with Keyla, she stated that she is doing well after he procedure. Advised of next procedure date of October 25. 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 Advised that I would have someone call her back to go over available appointments.

## 2021-11-01 ENCOUNTER — OUTSIDE FACILITY SERVICE (OUTPATIENT)
Dept: PAIN MEDICINE | Facility: CLINIC | Age: 56
End: 2021-11-01

## 2021-11-01 DIAGNOSIS — M51.37 DEGENERATION OF LUMBAR OR LUMBOSACRAL INTERVERTEBRAL DISC: ICD-10-CM

## 2021-11-01 DIAGNOSIS — M48.062 LUMBAR STENOSIS WITH NEUROGENIC CLAUDICATION: ICD-10-CM

## 2021-11-01 DIAGNOSIS — M47.816 SPONDYLOSIS OF LUMBAR REGION WITHOUT MYELOPATHY OR RADICULOPATHY: Primary | ICD-10-CM

## 2021-11-01 PROCEDURE — 64494 INJ PARAVERT F JNT L/S 2 LEV: CPT | Performed by: ANESTHESIOLOGY

## 2021-11-01 PROCEDURE — 64493 INJ PARAVERT F JNT L/S 1 LEV: CPT | Performed by: ANESTHESIOLOGY

## 2021-11-01 PROCEDURE — 99152 MOD SED SAME PHYS/QHP 5/>YRS: CPT | Performed by: ANESTHESIOLOGY

## 2021-11-02 ENCOUNTER — TELEPHONE (OUTPATIENT)
Dept: PAIN MEDICINE | Facility: CLINIC | Age: 56
End: 2021-11-02

## 2021-11-02 NOTE — TELEPHONE ENCOUNTER
Spoke with  pt regarding how they’re feeling after yesterday’s procedure with Dr. Razo. Pt advised that yesterday she experienced nausea and vomiting. Pt stated that she was feeling better today, just slightly nauseas.  I advised of procedure date and time. Also advised nothing to eat or drink the night prior, must have a  and that as of right now the  can come in, and that the procedure is in the [address] floor. Advised pt to contact us if needed

## 2021-11-08 ENCOUNTER — OFFICE VISIT (OUTPATIENT)
Dept: NEUROSURGERY | Facility: CLINIC | Age: 56
End: 2021-11-08

## 2021-11-08 VITALS
WEIGHT: 293 LBS | SYSTOLIC BLOOD PRESSURE: 122 MMHG | BODY MASS INDEX: 48.82 KG/M2 | TEMPERATURE: 97.1 F | HEIGHT: 65 IN | DIASTOLIC BLOOD PRESSURE: 78 MMHG

## 2021-11-08 DIAGNOSIS — G57.11 MERALGIA PARAESTHETICA, RIGHT: Primary | ICD-10-CM

## 2021-11-08 PROCEDURE — 99213 OFFICE O/P EST LOW 20 MIN: CPT | Performed by: PHYSICIAN ASSISTANT

## 2021-11-08 RX ORDER — GABAPENTIN 300 MG/1
300 CAPSULE ORAL 3 TIMES DAILY
Qty: 90 CAPSULE | Refills: 0 | Status: SHIPPED | OUTPATIENT
Start: 2021-11-08

## 2021-11-08 NOTE — PROGRESS NOTES
Patient: Keyla Davila  : 1965  Gender: female    Primary Care Provider: Eloy Estrada MD    Requesting Provider:  Eloy Estrada MD  [facility] [place] [facility] [address] [place] [address]     Chief Complaint: Follow-up    History of Present Illness:  Ms. Davila is a 56-year-old woman who presents today for follow-up of low back pain.  She was seen several months ago for symptoms that have been present for several months.  She denied any preceding event or trauma.  She also noted burning pain of her lateral right thigh.  Lumbar MRI demonstrated moderate canal and foraminal narrowing at L4-5.  She had been through PT to no avail.  She was referred Dr. Razo for consideration of injections.  She has been through 2 rounds of nerve block injections.  Ultimately her symptoms persist.  She continues to have pain that is no significant in her low back.  Her pain is worse when she is walking for long periods.  She continues to have dysesthetic pain of her lateral right thigh consistent with meralgia paresthetica.  More recently she has also developed numbness of her distal fingertips, particular when she is driving.  She denies neck pain or upper extremity radicular symptoms.  She denies bowel or bladder dysfunction.  She is scheduled for lumbar rhizotomies in a couple of weeks.  She also has an upcoming eyebrow lift scheduled.      Past Medical and Surgical History:  Past Medical History:   Diagnosis Date   • Arthritis    • Back problem    • Hx of bronchitis    • Hx of viral pneumonia    • Hypertension      Past Surgical History:   Procedure Laterality Date   •  SECTION  1987,    • HAND SURGERY Left    • HYSTERECTOMY     • LAPAROTOMY OVARIAN CYSTECTOMY     • TONSILLECTOMY         Current Medications:    Current Outpatient Medications:   •  ALPRAZolam (XANAX) 0.5 MG tablet, Take 0.5 mg by mouth 3 (Three) Times a Day., Disp: , Rfl:   •  bisoprolol (ZEBeta) 10 MG tablet,  Take 10 mg by mouth 2 (two) times a day., Disp: , Rfl:   •  doxepin (SINEquan) 100 MG capsule, Take 100 mg by mouth 2 (two) times a day., Disp: , Rfl:   •  furosemide (LASIX) 80 MG tablet, , Disp: , Rfl:   •  hydrOXYzine pamoate (VISTARIL) 50 MG capsule, Take 50 mg by mouth 4 (Four) Times a Day., Disp: , Rfl:   •  metOLazone (ZAROXOLYN) 2.5 MG tablet, , Disp: , Rfl:   •  potassium chloride (K-DUR,KLOR-CON) 20 MEQ CR tablet, , Disp: , Rfl:   •  traMADol (ULTRAM) 50 MG tablet, Take 50 mg by mouth 3 (Three) Times a Day., Disp: , Rfl:   •  Trintellix 20 MG tablet, Take 20 mg by mouth Daily., Disp: , Rfl:   •  Vraylar 1.5 MG capsule capsule, Take 1.5 mg by mouth Daily., Disp: , Rfl:   •  Xarelto 20 MG tablet, Take 20 mg by mouth Daily., Disp: , Rfl:   •  gabapentin (NEURONTIN) 300 MG capsule, Take 1 capsule by mouth 3 (Three) Times a Day., Disp: 90 capsule, Rfl: 0    Allergies:  No Known Allergies      Review of Systems   Constitutional: Positive for activity change, fatigue and unexpected weight gain. Negative for appetite change, chills, diaphoresis, fever and unexpected weight loss.   HENT: Positive for dental problem. Negative for congestion, drooling, ear discharge, ear pain, facial swelling, hearing loss, mouth sores, nosebleeds, postnasal drip, rhinorrhea, sinus pressure, sneezing, sore throat, tinnitus, trouble swallowing and voice change.    Eyes: Positive for itching and visual disturbance. Negative for blurred vision, double vision, photophobia, pain, discharge and redness.   Respiratory: Positive for chest tightness and shortness of breath. Negative for apnea, cough, choking, wheezing and stridor.    Cardiovascular: Positive for leg swelling. Negative for chest pain and palpitations.   Gastrointestinal: Negative.  Negative for abdominal distention, abdominal pain, anal bleeding, blood in stool, constipation, diarrhea, nausea, rectal pain, vomiting, GERD and indigestion.   Endocrine: Negative.  Negative for  cold intolerance, heat intolerance, polydipsia, polyphagia and polyuria.   Genitourinary: Negative.  Negative for breast discharge, breast lump, breast pain, decreased libido, decreased urine volume, difficulty urinating, dyspareunia, dysuria, flank pain, frequency, genital sores, hematuria, menstrual problem, pelvic pain, urgency, urinary incontinence, vaginal bleeding, vaginal discharge and vaginal pain.   Musculoskeletal: Positive for back pain, joint swelling and neck stiffness. Negative for arthralgias, gait problem, myalgias, neck pain and bursitis.   Skin: Negative.  Negative for color change, dry skin, pallor, rash, skin lesions and bruise.   Allergic/Immunologic: Negative.  Negative for environmental allergies, food allergies and immunocompromised state.   Neurological: Positive for weakness, light-headedness, numbness and headache. Negative for dizziness, tremors, seizures, syncope, facial asymmetry, speech difficulty, memory problem and confusion.   Hematological: Negative.  Negative for adenopathy. Does not bruise/bleed easily.   Psychiatric/Behavioral: Positive for decreased concentration, dysphoric mood, sleep disturbance and depressed mood. Negative for agitation, behavioral problems, hallucinations, self-injury, suicidal ideas, negative for hyperactivity and stress. The patient is nervous/anxious.    All other systems reviewed and are negative.        Physical Exam  Constitutional:       Appearance: Normal appearance.   Musculoskeletal:         General: Normal range of motion.      Cervical back: Normal range of motion and neck supple.   Skin:     General: Skin is warm and dry.   Neurological:      Mental Status: She is alert and oriented to person, place, and time.      Motor: Motor function is intact.      Coordination: Coordination is intact.      Gait: Gait is intact.   Psychiatric:         Mood and Affect: Mood normal.         Behavior: Behavior normal.           Vitals:    11/08/21 1057   BP:  "122/78   BP Location: Right arm   Patient Position: Sitting   Cuff Size: Adult   Temp: 97.1 °F (36.2 °C)   TempSrc: Infrared   Weight: 136 kg (299 lb)   Height: 165.1 cm (65\")       Patient's Body mass index is 49.76 kg/m². indicating that she is morbidly obese (BMI > 40 or > 35 with obesity - related health condition). Obesity-related health conditions include the following: hypertension. Obesity is unchanged. BMI is is above average; BMI management plan is completed. We discussed portion control and increasing exercise..    Independent Review of Diagnostic Imaging:  Lumbar MRI performed 6/21/2021 demonstrates degenerative discs throughout.  L4-5 there is moderate canal and foraminal narrowing bilaterally.  At L5-S1 there is left greater than right foraminal narrowing.  Plain films demonstrate no evidence of instability    Assessment:  1.  Mechanical low back pain  2.  Lumbar stenosis  3.  Morbid obesity  4.  Right meralgia paresthetica    Plan:  Ms. Davila is seen today in follow-up.  She has been through 2 rounds of bilateral nerve block L4-5 and L5-S1 with Dr. Razo.  Ultimately her symptoms are not significantly improved.  She continues to harbor back pain as well as dysesthetic pain in her lateral right thigh.  She also informed me of some numbness/tingling of her hands that have occurred as of late.  At this point I would proceed with upper and lower extremity EMG/NCV for further evaluation, however she would like to hold off given her upcoming surgical brow lift.  We will plan to see her back in clinic after she recovers from surgery and has her rhizotomy to Dr. Razo.  I have increased her gabapentin to 300 3 times daily.  She will call with new or worsening symptoms prior to follow-up.        Alissa Benavides PA-C  "

## 2021-12-02 DIAGNOSIS — M47.816 SPONDYLOSIS OF LUMBAR REGION WITHOUT MYELOPATHY OR RADICULOPATHY: Primary | ICD-10-CM

## 2021-12-06 ENCOUNTER — OUTSIDE FACILITY SERVICE (OUTPATIENT)
Dept: PAIN MEDICINE | Facility: CLINIC | Age: 56
End: 2021-12-06

## 2021-12-06 PROCEDURE — OUTSIDEPOS PR OUTSIDE POS PLACEHOLDER: Performed by: ANESTHESIOLOGY

## 2021-12-20 ENCOUNTER — OUTSIDE FACILITY SERVICE (OUTPATIENT)
Dept: PAIN MEDICINE | Facility: CLINIC | Age: 56
End: 2021-12-20

## 2021-12-20 PROCEDURE — OUTSIDEPOS PR OUTSIDE POS PLACEHOLDER: Performed by: ANESTHESIOLOGY

## 2022-01-05 DIAGNOSIS — M47.816 SPONDYLOSIS OF LUMBAR REGION WITHOUT MYELOPATHY OR RADICULOPATHY: Primary | ICD-10-CM

## 2022-01-10 DIAGNOSIS — M47.816 SPONDYLOSIS OF LUMBAR REGION WITHOUT MYELOPATHY OR RADICULOPATHY: Primary | ICD-10-CM

## 2022-01-12 ENCOUNTER — OUTSIDE FACILITY SERVICE (OUTPATIENT)
Dept: PAIN MEDICINE | Facility: CLINIC | Age: 57
End: 2022-01-12

## 2022-01-12 PROCEDURE — 99152 MOD SED SAME PHYS/QHP 5/>YRS: CPT | Performed by: ANESTHESIOLOGY

## 2022-01-12 PROCEDURE — 64635 DESTROY LUMB/SAC FACET JNT: CPT | Performed by: ANESTHESIOLOGY

## 2022-01-12 PROCEDURE — 64636 DESTROY L/S FACET JNT ADDL: CPT | Performed by: ANESTHESIOLOGY

## 2022-01-13 ENCOUNTER — TELEPHONE (OUTPATIENT)
Dept: PAIN MEDICINE | Facility: CLINIC | Age: 57
End: 2022-01-13

## 2022-01-13 NOTE — TELEPHONE ENCOUNTER
Spoke with pt regarding yesterday’s procedure with Dr. Razo. Pt stated they are doing well,with soreness. Pt stated that it was painful yesterday, but moving better today. Advised that it can take a month for full effect. Advised of f/u appointment. Pt understood, and no further needs expressed

## 2022-02-14 ENCOUNTER — OFFICE VISIT (OUTPATIENT)
Dept: NEUROSURGERY | Facility: CLINIC | Age: 57
End: 2022-02-14

## 2022-02-14 VITALS
BODY MASS INDEX: 48.42 KG/M2 | DIASTOLIC BLOOD PRESSURE: 98 MMHG | WEIGHT: 290.6 LBS | TEMPERATURE: 97.3 F | HEIGHT: 65 IN | SYSTOLIC BLOOD PRESSURE: 148 MMHG

## 2022-02-14 DIAGNOSIS — G89.29 CHRONIC BILATERAL LOW BACK PAIN WITHOUT SCIATICA: ICD-10-CM

## 2022-02-14 DIAGNOSIS — M54.50 CHRONIC BILATERAL LOW BACK PAIN WITHOUT SCIATICA: ICD-10-CM

## 2022-02-14 DIAGNOSIS — E66.01 MORBID OBESITY WITH BMI OF 45.0-49.9, ADULT: ICD-10-CM

## 2022-02-14 DIAGNOSIS — M51.36 DDD (DEGENERATIVE DISC DISEASE), LUMBAR: Primary | ICD-10-CM

## 2022-02-14 PROCEDURE — 99214 OFFICE O/P EST MOD 30 MIN: CPT | Performed by: PHYSICIAN ASSISTANT

## 2022-02-14 RX ORDER — DOXEPIN HYDROCHLORIDE 50 MG/1
100 CAPSULE ORAL NIGHTLY
COMMUNITY
Start: 2022-01-11 | End: 2022-02-14

## 2022-02-14 RX ORDER — PROMETHAZINE HYDROCHLORIDE 25 MG/1
TABLET ORAL AS NEEDED
COMMUNITY
Start: 2021-12-11 | End: 2022-04-27

## 2022-02-14 RX ORDER — METRONIDAZOLE 500 MG/1
TABLET ORAL
COMMUNITY
Start: 2021-12-06 | End: 2022-04-27

## 2022-02-14 NOTE — PROGRESS NOTES
Keyla Ramosley   1965   [id number]       2022     Chief Complaint   Patient presents with   • Follow-up        HPISaad is a 57 yo female with chronic LBP, left meralgia paresthetica, DDD at L3-4, significant at L4-5 and worse at L5-S1. She previously gave symptoms of numbness in the distal fingertips and EMG is pending.      Chronic Illnesses:  Chronic low back pain  DDD  Past Medical History:  No date: Arthritis  No date: Back problem  No date: Hx of bronchitis  No date: Hx of viral pneumonia  No date: Hypertension     Past Surgical History:   Procedure Laterality Date   •  SECTION  1987,    • HAND SURGERY Left    • HYSTERECTOMY     • LAPAROTOMY OVARIAN CYSTECTOMY     • TONSILLECTOMY          No Known Allergies       Current Outpatient Medications:   •  ALPRAZolam (XANAX) 0.5 MG tablet, Take 0.5 mg by mouth 3 (Three) Times a Day., Disp: , Rfl:   •  bisoprolol (ZEBeta) 10 MG tablet, Take 10 mg by mouth 2 (two) times a day., Disp: , Rfl:   •  Cholecalciferol 50 MCG (2000 UT) capsule, Take 2,000 Units by mouth Daily., Disp: , Rfl:   •  doxepin (SINEquan) 100 MG capsule, Take 100 mg by mouth 2 (two) times a day., Disp: , Rfl:   •  furosemide (LASIX) 80 MG tablet, , Disp: , Rfl:   •  gabapentin (NEURONTIN) 300 MG capsule, Take 1 capsule by mouth 3 (Three) Times a Day., Disp: 90 capsule, Rfl: 0  •  hydrOXYzine pamoate (VISTARIL) 50 MG capsule, Take 100 mg by mouth 2 (Two) Times a Day., Disp: , Rfl:   •  metOLazone (ZAROXOLYN) 2.5 MG tablet, , Disp: , Rfl:   •  metroNIDAZOLE (FLAGYL) 500 MG tablet, , Disp: , Rfl:   •  potassium chloride (K-DUR,KLOR-CON) 20 MEQ CR tablet, , Disp: , Rfl:   •  promethazine (PHENERGAN) 25 MG tablet, As Needed., Disp: , Rfl:   •  Trintellix 20 MG tablet, Take 10 mg by mouth Daily., Disp: , Rfl:   •  Vraylar 1.5 MG capsule capsule, Take 1.5 mg by mouth Daily., Disp: , Rfl:   •  Xarelto 20 MG tablet, Take 20 mg by mouth Daily., Disp: , Rfl:      Social  "History     Socioeconomic History   • Marital status:    Tobacco Use   • Smoking status: Never Smoker   • Smokeless tobacco: Never Used   Vaping Use   • Vaping Use: Never used   Substance and Sexual Activity   • Alcohol use: Yes     Comment: Occ   • Drug use: Never   • Sexual activity: Defer        family history includes Alcohol abuse in her father; Arthritis in her mother; Cancer in her maternal grandfather, mother, and paternal grandfather; Cirrhosis in her father; Drug abuse in her daughter; Hearing loss in her maternal grandfather; Heart disease in her father and mother; Hypertension in her father, maternal grandfather, and mother; Kidney disease in her mother; Liver disease in her mother.     Social History    Tobacco Use      Smoking status: Never Smoker      Smokeless tobacco: Never Used       Body mass index is 48.36 kg/m².   Patient's Body mass index is 48.36 kg/m². indicating that she is morbidly obese (BMI > 40 or > 35 with obesity - related health condition). Obesity-related health conditions include the following: osteoarthritis. Obesity is unchanged. BMI is is above average; BMI management plan is completed.     /98   Temp 97.3 °F (36.3 °C)   Ht 165.1 cm (65\")   Wt 132 kg (290 lb 9.6 oz)   BMI 48.36 kg/m²    Physical Examination:  HEENT-wnl  Lungs-No wheezing or SOB    Neurologic Exam     Mental Status   Oriented to person, place, and time.   Attention: normal. Concentration: normal.   Speech: speech is normal   Level of consciousness: alert  Knowledge: good.     Cranial Nerves   Cranial nerves II through XII intact.     Motor Exam   Muscle bulk: normal  Overall muscle tone: normal    Sensory Exam   Light touch normal.     Gait, Coordination, and Reflexes     Gait  Gait: normal    Tremor   Resting tremor: absent  Intention tremor: absent  Action tremor: absent     Radiological Data Review:  I have independently reviewed the imaging and discussed the findings with the patient.   She " has not had nerve studies.    Assessment and Plan:  1. DDD L4-S1  2. Chronic LBP with exacerbation  3. Obesity,, BMI 48.36, weight 290. We have discussed referral to Bariatrics and she is in agreement.  4. I would send her to PT for lumbar Will program for her back pain.  5. If she were to develop radiculopathy then we would re-evaluate for possible surgical intervention.    It was a pleasure providing neurosurgical evaluation.      Jasmine Perkins, PAC      PCP:  Eloy Estrada MD

## 2022-04-27 ENCOUNTER — DOCUMENTATION (OUTPATIENT)
Dept: BARIATRICS/WEIGHT MGMT | Facility: CLINIC | Age: 57
End: 2022-04-27

## 2022-04-27 ENCOUNTER — OFFICE VISIT (OUTPATIENT)
Dept: BARIATRICS/WEIGHT MGMT | Facility: CLINIC | Age: 57
End: 2022-04-27

## 2022-04-27 VITALS
RESPIRATION RATE: 18 BRPM | TEMPERATURE: 96.9 F | SYSTOLIC BLOOD PRESSURE: 142 MMHG | BODY MASS INDEX: 50.02 KG/M2 | HEIGHT: 64 IN | WEIGHT: 293 LBS | HEART RATE: 76 BPM | OXYGEN SATURATION: 95 % | DIASTOLIC BLOOD PRESSURE: 70 MMHG

## 2022-04-27 DIAGNOSIS — R53.83 FATIGUE, UNSPECIFIED TYPE: ICD-10-CM

## 2022-04-27 DIAGNOSIS — I10 HYPERTENSION, UNSPECIFIED TYPE: ICD-10-CM

## 2022-04-27 DIAGNOSIS — R00.2 PALPITATIONS: Primary | ICD-10-CM

## 2022-04-27 DIAGNOSIS — E78.5 HYPERLIPIDEMIA, UNSPECIFIED HYPERLIPIDEMIA TYPE: ICD-10-CM

## 2022-04-27 DIAGNOSIS — R10.13 DYSPEPSIA: ICD-10-CM

## 2022-04-27 PROBLEM — N80.9 ENDOMETRIOSIS: Status: ACTIVE | Noted: 2022-04-27

## 2022-04-27 PROBLEM — E28.2 PCOS (POLYCYSTIC OVARIAN SYNDROME): Status: ACTIVE | Noted: 2022-04-27

## 2022-04-27 PROBLEM — I82.4Z1 ACUTE DEEP VEIN THROMBOSIS (DVT) OF DISTAL VEIN OF RIGHT LOWER EXTREMITY: Status: ACTIVE | Noted: 2017-01-01

## 2022-04-27 PROBLEM — M54.50 CHRONIC LOW BACK PAIN: Status: ACTIVE | Noted: 2022-04-27

## 2022-04-27 PROBLEM — G89.29 CHRONIC LOW BACK PAIN: Status: ACTIVE | Noted: 2022-04-27

## 2022-04-27 PROBLEM — N15.9 KIDNEY INFECTION: Status: ACTIVE | Noted: 2021-01-01

## 2022-04-27 PROCEDURE — 99214 OFFICE O/P EST MOD 30 MIN: CPT | Performed by: PHYSICIAN ASSISTANT

## 2022-04-27 RX ORDER — BISOPROLOL FUMARATE 10 MG/1
TABLET, FILM COATED ORAL DAILY
COMMUNITY
End: 2022-04-27

## 2022-04-27 NOTE — PROGRESS NOTES
"Weight Loss Surgery  Presurgical Nutrition Assessment     Keyla Davila  2022  [id number]  [id number]  1965  female    Surgery desired: Sleeve Gastrectomy    Height: 161.3 cm (63.5\")  Weight: 133 kg (293 #)  BMI: 51.09    Past Medical History:   Diagnosis Date   • Acute deep vein thrombosis (DVT) of distal vein of right lower extremity (HCC) 2017    DVT x 1; has had 2 superficial vein clots since. On Xarelto   • Anxiety and depression     currently sees psychiatrist and counselor   • Arthritis    • Back problem    • Chronic low back pain     DDD; takes gabapentin; PRN Tylenol.   • Dyspepsia    • Endometriosis     s/p partial hysterectomy   • HLD (hyperlipidemia)    • Hx of bronchitis    • Hx of viral pneumonia    • Hypertension    • Hypertension    • Kidney infection     bilateral; resulted in sepsis and admission x 2 weeks   • Palpitations     Sees Dr. Alvares   • PCOS (polycystic ovarian syndrome)     s/p R oopherectomy     Past Surgical History:   Procedure Laterality Date   •  SECTION  1987, ; lower transverse incision   • DIAGNOSTIC LAPAROSCOPY EXPLORATORY LAPAROTOMY      x5; last one in ; due to PCOS and endometriosis   • FACIAL COSMETIC SURGERY     • HAND SURGERY Left    • LAPAROSCOPIC HYSTERECTOMY      partial; R oopherectomy- still has L ovary   • TONSILLECTOMY       No Known Allergies    Current Outpatient Medications:   •  ALPRAZolam (XANAX) 0.5 MG tablet, Take 0.5 mg by mouth 3 (Three) Times a Day., Disp: , Rfl:   •  bisoprolol (ZEBeta) 10 MG tablet, Take 10 mg by mouth 2 (two) times a day., Disp: , Rfl:   •  Cholecalciferol 50 MCG (2000 UT) capsule, Take 2,000 Units by mouth Daily., Disp: , Rfl:   •  doxepin (SINEquan) 100 MG capsule, Take 100 mg by mouth 2 (two) times a day., Disp: , Rfl:   •  gabapentin (NEURONTIN) 300 MG capsule, Take 1 capsule by mouth 3 (Three) Times a Day., Disp: 90 capsule, Rfl: 0  •  hydrOXYzine pamoate (VISTARIL) 50 MG " capsule, Take 100 mg by mouth 2 (Two) Times a Day., Disp: , Rfl:   •  metOLazone (ZAROXOLYN) 2.5 MG tablet, , Disp: , Rfl:   •  potassium chloride (K-DUR,KLOR-CON) 20 MEQ CR tablet, , Disp: , Rfl:   •  Trintellix 20 MG tablet, Take 10 mg by mouth Daily., Disp: , Rfl:   •  Vraylar 1.5 MG capsule capsule, Take 1.5 mg by mouth Daily., Disp: , Rfl:   •  Xarelto 20 MG tablet, Take 20 mg by mouth Daily., Disp: , Rfl:       Nutrition Assessment    Estimated energy needs:  1885 kcal    Estimated calories for weight loss:  1400  kcal    IBW (Pounds):  145 #        Excess body weight (Pounds):  150 #       Nutrition Recall  24 Hour recall: (B) (L) (D) -  Reviewed and discussed with patient.  Breakfast @ 7:30 am = ham, egg & cheese on toast with 15 to 20 tiny tater tots + a 20 oz fountain Dr Pepper.  Lunch @ 11:30 am = 1 bagel with 2 tbsp cream cheese with water to drink.  Supper @ 7 pm = 2 small slices of medium-sized pizza (pepperoni, spinach, mushrooms, green peppers & black olives), with 20 oz water.  Snack @ 9:30 pm = 2 stalks celery /c 2 tbsp cream cheese with 1/2 cup 2% milk.  Diet is low in protein, high in processed food (especially carbohydrate) from fast food and other restaurants, and lacking fruits and vegetables.  Patient will focus on ingesting adequate protein for successful weight loss in 3 regular balanced meals + 2 to 3 high protein snacks per day. She will wean herself off of all sweetened beverages and drink more plain water or water infused with fruit.     Exercise  never      Education    Provided information packet re:  Sleeve Gastrectomy  1. Reviewed guidelines for higher protein, limited carbohydrate diet to promote weight loss.  Encouraged patient to incorporate these principles of healthy eating from now until approximately 2 weeks prior to bariatric surgery date, when an even lower carbohydrate “liver-shrinking” regimen will be followed. (Information sheet re pre-op diet given).  Explained that  after recovery from surgery this diet will again be followed to ensure further loss and for weight maintenance.    2. Encouraged patient to choose an acceptable protein supplement powder or shake for post-surgery liquid diet.  Provided product guidelines and examples.    3. Explained importance of goal setting to help in changing eating behaviors that are not conducive to weight loss.  Targeted several on a worksheet which also included spaces for patient to work on issues specific to them.  4. Provided follow-up options for support, including contact information for dietitians here, if desired.  Web-based support information and apps for smart phones and computers given.  Noted that monthly support group is offered at this clinic, and that support is associated with successful weight loss.    Recommend that team proceed with surgery and follow per protocol.      Nutrition Goals   Dietary Guidelines per information packet as described above  Protein goal:  grams per day   Carbohydrate goal:  100-140 grams per day  Eliminate soda, sweet tea, etc.     Exercise Goals  Continue current exercise routine   Add 15-30 minutes of activity per day as tolerated      Randi Dee RD  04/27/2022  11:59 EDT

## 2022-04-27 NOTE — PROGRESS NOTES
[facility] BARIATRIC SURGERY  [address]  [address]  [facility] [id number] [phone]      Patient  Name:  Keyla Davila  :  1965      Date of Visit: 2022      Chief Complaint:  weight gain; unable to maintain weight loss      History of Present Illness:  Keyla Davila is a 57 y.o. female who presents today for evaluation, education and consultation regarding metabolic and bariatric surgery with Dr. Vee.     Keyla has been overweight for at least 30+ years, has been 35 pounds or more overweight for at least 15+ years, has been 100 pounds or more overweight for 5 or more years and started dieting at age 44.  Previous diet attempts include: High Protein, Low Carbohydrate and Calorie Counting; Weight Watchers; Wellbutrin, Amphetamines, Phendiet and Phentrol.  The most weight Keyla lost was 43 pounds with WW, but was unable to maintain that weight loss.  Her maximum lifetime weight is 300 pounds.    Her daughter passed away last year and she is now raising her 2 young grandchildren and wants to live a healthier lifestyle for them.    GI: Patient does report some postprandial nausea and occasional abdominal pain.  No recent gallbladder evaluation.  She denies any heartburn.  She has had 5 diagnostic laparoscopies over the span of several years due to a significant history of endometriosis and PCOS.  Her most recent diagnostic lap was in .    Cardiac/Pulm: History of DVT in 2017.  She has had 2 2 episodes of superficial clots since that time, but no recurrent DVT.  She remains on daily Xarelto therapy managed by her PCP.  She also has a history of hypertension and palpitations.  She follows with Dr. Alvares at [facility].    Other past medical history includes history of PCOS and endometriosis with subsequent partial hysterectomy and right oophorectomy.  She also has anxiety/depression and routinely sees a psychiatrist and counselor.    She is a lifelong  nonsmoker, but her  smokes in the house.      Complete history has been obtained and discussed today, as pertinent to metabolic/ bariatric surgery.     Past Medical History:   Diagnosis Date   • Acute deep vein thrombosis (DVT) of distal vein of right lower extremity (HCC) 2017    DVT x 1; has had 2 superficial vein clots since. On Xarelto   • Anxiety and depression     currently sees psychiatrist and counselor   • Arthritis    • Back problem    • Chronic low back pain     DDD; takes gabapentin; PRN Tylenol.   • Dyspepsia    • Endometriosis     s/p partial hysterectomy   • HLD (hyperlipidemia)    • Hx of bronchitis    • Hx of viral pneumonia    • Hypertension    • Hypertension    • Kidney infection     bilateral; resulted in sepsis and admission x 2 weeks   • Palpitations     Sees Dr. Alvares   • PCOS (polycystic ovarian syndrome)     s/p R oopherectomy     Past Surgical History:   Procedure Laterality Date   •  SECTION  1987, ; lower transverse incision   • DIAGNOSTIC LAPAROSCOPY EXPLORATORY LAPAROTOMY      x5; last one in ; due to PCOS and endometriosis   • FACIAL COSMETIC SURGERY     • HAND SURGERY Left    • LAPAROSCOPIC HYSTERECTOMY      partial; R oopherectomy- still has L ovary   • TONSILLECTOMY         No Known Allergies    Current Outpatient Medications:   •  ALPRAZolam (XANAX) 0.5 MG tablet, Take 0.5 mg by mouth 3 (Three) Times a Day., Disp: , Rfl:   •  bisoprolol (ZEBeta) 10 MG tablet, Take 10 mg by mouth 2 (two) times a day., Disp: , Rfl:   •  Cholecalciferol 50 MCG (2000 UT) capsule, Take 2,000 Units by mouth Daily., Disp: , Rfl:   •  doxepin (SINEquan) 100 MG capsule, Take 100 mg by mouth 2 (two) times a day., Disp: , Rfl:   •  gabapentin (NEURONTIN) 300 MG capsule, Take 1 capsule by mouth 3 (Three) Times a Day., Disp: 90 capsule, Rfl: 0  •  hydrOXYzine pamoate (VISTARIL) 50 MG capsule, Take 100 mg by mouth 2 (Two) Times a Day., Disp: , Rfl:   •   potassium chloride (K-DUR,KLOR-CON) 20 MEQ CR tablet, , Disp: , Rfl:   •  Trintellix 20 MG tablet, Take 10 mg by mouth Daily., Disp: , Rfl:   •  Vraylar 1.5 MG capsule capsule, Take 1.5 mg by mouth Daily., Disp: , Rfl:   •  Xarelto 20 MG tablet, Take 20 mg by mouth Daily., Disp: , Rfl:   •  metOLazone (ZAROXOLYN) 2.5 MG tablet, , Disp: , Rfl:     Social History     Socioeconomic History   • Marital status:    Tobacco Use   • Smoking status: Never Smoker   • Smokeless tobacco: Never Used   Vaping Use   • Vaping Use: Never used   Substance and Sexual Activity   • Alcohol use: Yes     Comment: Occ   • Drug use: Never   • Sexual activity: Defer     Social History     Social History Narrative     w/ 2 children; Also raising two young grandchildren. 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        Family History   Problem Relation Age of Onset   • Arthritis Mother    • Cancer Mother    • Heart disease Mother    • Hypertension Mother    • Liver disease Mother    • Kidney disease Mother    • Heart disease Father    • Hypertension Father    • Alcohol abuse Father    • Cirrhosis Father    • Rheum arthritis Sister    • Clotting disorder Brother    • Drug abuse Daughter    • Cancer Maternal Grandfather    • Hearing loss Maternal Grandfather    • Hypertension Maternal Grandfather    • Cancer Paternal Grandfather        Review of Systems:  Constitutional:  reports fatigue, weight gain and denies fevers, chills.  HEENT:  denies headache, ear pain or loss of hearing, blurred or double vision, nasal discharge or sore throat.  Cardiovascular:  reports HTN, HLD, palpitations, hx DVT and denies CAD, Atrial Fib, hx heart disease, heart murmur, hx MI, chest pain, edema.  Respiratory:  reports shortness of breath  and denies dyspnea on exertion, cough , wheezing, sleep apnea, asthma, COPD, hx PE.  Gastrointestinal:  reports nausea, abdominal pain and denies dysphagia, heartburn, vomiting, IBS, diarrhea, constipation, melena,  blood in stool, gallbladder issues, liver disease, hx pancreatitis.  Genitourinary:  reports hx of kidney infection and denies history of  frequent UTI, incontinence, hematuria, dysuria, polyuria, polydipsia, renal insufficiency, renal failure.    Musculoskeletal:  reports back pain, arthritis and denies joint pain and autoimmune disease.  Neurological:  reports none and denies headaches, migraines, numbness /tingling, dizziness, confusion, seizure, stroke.  Psychiatric:  reports hx depression, hx anxiety and denies depressed mood, feeling anxious, bipolar disorder, suicidal ideation, hx suicide attempt, hx self injury, hx substance abuse, eating disorder.  Endocrine:  reports endometriosis and denies PCOS, glucose intolerance, diabetes, thyroid disease, gout.  Hematologic:  reports none and denies bruising, bleeding disorder, hx anemia, hx blood transfusion.  Skin:  reports none and denies rashes, hx MRSA.      COVID-19 Questionnaire:    1.  Have you previously been tested for COVID-19?    []  No  [x]  Yes  2.  Were you ever positive for COVID-19?    [x]  No  []  Yes  3.  Are you employed in a healthcare setting?    [x]  No  []  Yes  4.  Are you symptomatic for COVID-19 as defined by the CDC (fever, cough)?  If so, when did symptoms begin?    [x]  No  []  Yes, symptoms began **  5.  Have you been hospitalized for COVID-19?  If so, were you in the ICU?  [x]  No  []  Yes, but not in the ICU  []  Yes, and I was in the ICU  6.  Are you a resident in a congregate (group care setting?)    [x]  No  []  Yes  7.  Are you pregnant?  [x]  No  []  Yes      Physical Exam:  Vital Signs:  Weight: 133 kg (293 lb)   Body mass index is 51.09 kg/m².  Temp: 96.9 °F (36.1 °C)   Heart Rate: 76   BP: 142/70     Physical Exam  Constitutional:       Appearance: She is obese.   HENT:      Head: Normocephalic and atraumatic.   Eyes:      Extraocular Movements: Extraocular movements intact.      Conjunctiva/sclera: Conjunctivae normal.    Cardiovascular:      Rate and Rhythm: Normal rate and regular rhythm.   Pulmonary:      Effort: Pulmonary effort is normal.      Breath sounds: Normal breath sounds.   Abdominal:      General: Bowel sounds are normal. There is no distension.      Palpations: Abdomen is soft. There is no mass.      Tenderness: There is no abdominal tenderness.      Comments: Lower abdominal transverse scar; old lap scars   Musculoskeletal:         General: Normal range of motion.      Cervical back: Normal range of motion and neck supple.   Skin:     General: Skin is warm and dry.   Neurological:      General: No focal deficit present.      Mental Status: She is alert and oriented to person, place, and time.   Psychiatric:         Mood and Affect: Mood normal.         Behavior: Behavior normal.         Thought Content: Thought content normal.         Judgment: Judgment normal.         Patient Active Problem List   Diagnosis   • Spondylosis of lumbar region without myelopathy or radiculopathy   • Degeneration of lumbar or lumbosacral intervertebral disc   • Lumbar stenosis with neurogenic claudication   • Morbid obesity (HCC)   • Anxiety and depression   • Physical deconditioning   • Chronic anticoagulation   • Gait disturbance   • Lymphedema of both lower extremities   • Meralgia paraesthetica, right   • Hypertension   • HLD (hyperlipidemia)   • Acute deep vein thrombosis (DVT) of distal vein of right lower extremity (HCC)   • Dyspepsia   • Kidney infection   • Chronic low back pain   • Endometriosis   • PCOS (polycystic ovarian syndrome)   • Palpitations       Assessment:  57 y.o. female with medically complicated obesity pursuing sleeve gastrectomy.    Metabolic & Bariatric Surgery is deemed medically necessary given the following: Class 3 Severe Obesity (BMI >=40). Obesity-related health conditions include the following: hypertension, dyslipidemias and osteoarthritis. Obesity is worsening. BMI is is above average; BMI management  plan is completed. We discussed consulting a Bariatric surgeon.        Plan:  Further evaluation will include: CBC, CMP, Lipids, TSH, HgA1C, H.Pylori UBT, EKG, CXR, GBUS and Gallbladder Eval.    Additional clearances needed prior to surgery will include: Cardiology and Anticoagulation clearance from PCP .     Patient understands that bariatric surgery is not cosmetic surgery but rather a tool to help make a lifelong commitment to lifestyle changes including diet, exercise and behavior modifications.  The patient has been educated today on those expected postoperative lifestyle changes.  Psychological and Nutritional consultations will be completed prior to surgery.  Instructions on how to access [employer] (an internet based site w/ educational surgical videos) were given to the patient.  Recommended perioperative vitamin supplementation was reviewed.  The importance of avoiding ASA/ NSAIDS/ steroids/ tobacco/nicotine/ hormones/ immunomodulators perioperatively was discussed in detail.  All questions/concerns have been addressed.      Further input to follow pending the above.           SHILOH Guzmán        ADDENDUM:     Renal function abnormal. Will add nephrology clearance to checklist.

## 2022-04-29 LAB
ALBUMIN SERPL-MCNC: 4.5 G/DL (ref 3.8–4.9)
ALBUMIN/GLOB SERPL: 1.8 {RATIO} (ref 1.2–2.2)
ALP SERPL-CCNC: 105 IU/L (ref 44–121)
ALT SERPL-CCNC: 79 IU/L (ref 0–32)
AST SERPL-CCNC: 60 IU/L (ref 0–40)
BASOPHILS # BLD AUTO: 0 X10E3/UL (ref 0–0.2)
BASOPHILS NFR BLD AUTO: 0 %
BILIRUB SERPL-MCNC: 0.7 MG/DL (ref 0–1.2)
BUN SERPL-MCNC: 19 MG/DL (ref 6–24)
BUN/CREAT SERPL: 14 (ref 9–23)
CALCIUM SERPL-MCNC: 9.7 MG/DL (ref 8.7–10.2)
CHLORIDE SERPL-SCNC: 103 MMOL/L (ref 96–106)
CHOLEST SERPL-MCNC: 251 MG/DL (ref 100–199)
CO2 SERPL-SCNC: 22 MMOL/L (ref 20–29)
CREAT SERPL-MCNC: 1.38 MG/DL (ref 0.57–1)
EGFRCR SERPLBLD CKD-EPI 2021: 44 ML/MIN/1.73
EOSINOPHIL # BLD AUTO: 0.1 X10E3/UL (ref 0–0.4)
EOSINOPHIL NFR BLD AUTO: 2 %
ERYTHROCYTE [DISTWIDTH] IN BLOOD BY AUTOMATED COUNT: 13.8 % (ref 11.7–15.4)
GLOBULIN SER CALC-MCNC: 2.5 G/DL (ref 1.5–4.5)
GLUCOSE SERPL-MCNC: 103 MG/DL (ref 65–99)
HBA1C MFR BLD: 6.4 % (ref 4.8–5.6)
HCT VFR BLD AUTO: 46.7 % (ref 34–46.6)
HDLC SERPL-MCNC: 47 MG/DL
HGB BLD-MCNC: 15.1 G/DL (ref 11.1–15.9)
IMM GRANULOCYTES # BLD AUTO: 0 X10E3/UL (ref 0–0.1)
IMM GRANULOCYTES NFR BLD AUTO: 0 %
LDLC SERPL CALC-MCNC: 167 MG/DL (ref 0–99)
LYMPHOCYTES # BLD AUTO: 3.1 X10E3/UL (ref 0.7–3.1)
LYMPHOCYTES NFR BLD AUTO: 41 %
MCH RBC QN AUTO: 28 PG (ref 26.6–33)
MCHC RBC AUTO-ENTMCNC: 32.3 G/DL (ref 31.5–35.7)
MCV RBC AUTO: 87 FL (ref 79–97)
MONOCYTES # BLD AUTO: 0.6 X10E3/UL (ref 0.1–0.9)
MONOCYTES NFR BLD AUTO: 8 %
NEUTROPHILS # BLD AUTO: 3.7 X10E3/UL (ref 1.4–7)
NEUTROPHILS NFR BLD AUTO: 49 %
PLATELET # BLD AUTO: 223 X10E3/UL (ref 150–450)
POTASSIUM SERPL-SCNC: 4.7 MMOL/L (ref 3.5–5.2)
PROT SERPL-MCNC: 7 G/DL (ref 6–8.5)
RBC # BLD AUTO: 5.39 X10E6/UL (ref 3.77–5.28)
SODIUM SERPL-SCNC: 144 MMOL/L (ref 134–144)
TRIGL SERPL-MCNC: 199 MG/DL (ref 0–149)
TSH SERPL DL<=0.005 MIU/L-ACNC: 2.96 UIU/ML (ref 0.45–4.5)
UREA BREATH TEST QL: NEGATIVE
VLDLC SERPL CALC-MCNC: 37 MG/DL (ref 5–40)
WBC # BLD AUTO: 7.5 X10E3/UL (ref 3.4–10.8)

## 2022-05-12 ENCOUNTER — HOSPITAL ENCOUNTER (OUTPATIENT)
Dept: ULTRASOUND IMAGING | Facility: HOSPITAL | Age: 57
Discharge: HOME | End: 2022-05-12

## 2022-05-24 ENCOUNTER — TELEPHONE (OUTPATIENT)
Dept: BARIATRICS/WEIGHT MGMT | Facility: CLINIC | Age: 57
End: 2022-05-24

## 2022-05-24 NOTE — TELEPHONE ENCOUNTER
Pt called with nutrition questions pre op sleeve  Pt stated she gained 5 lbs at her PCP visit compared to our scale but clothes still fit the same  Explained each scale is different   Q re protein and carbs-100g protein a day and 100-140g carbs a day  Protein first, fruit, vegetable salad next and carbs last  Has introduced premier clear protein, ezekial bread, avocado and cheese   Trying to reduce fast food, discussed lower fat items and to try leftovers and quick easy to prepare dinners when her family want fast food  Reassured pt  Pt to call with further questions

## 2022-06-01 ENCOUNTER — OFFICE VISIT (OUTPATIENT)
Dept: PAIN MEDICINE | Facility: CLINIC | Age: 57
End: 2022-06-01

## 2022-06-01 VITALS
HEIGHT: 64 IN | RESPIRATION RATE: 16 BRPM | OXYGEN SATURATION: 97 % | WEIGHT: 293 LBS | HEART RATE: 71 BPM | DIASTOLIC BLOOD PRESSURE: 80 MMHG | BODY MASS INDEX: 50.02 KG/M2 | SYSTOLIC BLOOD PRESSURE: 110 MMHG

## 2022-06-01 DIAGNOSIS — I89.0 LYMPHEDEMA OF BOTH LOWER EXTREMITIES: ICD-10-CM

## 2022-06-01 DIAGNOSIS — M48.062 LUMBAR STENOSIS WITH NEUROGENIC CLAUDICATION: Primary | ICD-10-CM

## 2022-06-01 DIAGNOSIS — Z79.01 CHRONIC ANTICOAGULATION: ICD-10-CM

## 2022-06-01 DIAGNOSIS — M47.816 SPONDYLOSIS OF LUMBAR REGION WITHOUT MYELOPATHY OR RADICULOPATHY: ICD-10-CM

## 2022-06-01 DIAGNOSIS — F32.A ANXIETY AND DEPRESSION: ICD-10-CM

## 2022-06-01 DIAGNOSIS — E66.01 MORBID OBESITY: ICD-10-CM

## 2022-06-01 DIAGNOSIS — R53.81 PHYSICAL DECONDITIONING: ICD-10-CM

## 2022-06-01 DIAGNOSIS — R26.9 GAIT DISTURBANCE: ICD-10-CM

## 2022-06-01 DIAGNOSIS — F41.9 ANXIETY AND DEPRESSION: ICD-10-CM

## 2022-06-01 DIAGNOSIS — M48.062 LUMBAR STENOSIS WITH NEUROGENIC CLAUDICATION: ICD-10-CM

## 2022-06-01 DIAGNOSIS — G57.11 MERALGIA PARAESTHETICA, RIGHT: ICD-10-CM

## 2022-06-01 PROCEDURE — 99213 OFFICE O/P EST LOW 20 MIN: CPT | Performed by: NURSE PRACTITIONER

## 2022-06-01 NOTE — PROGRESS NOTES
"Chief complaint: \"Lower back and gluteal pain\"    History of present illness: Mrs. Keyla Davila is a 57 y.o. female, who returns to the clinic for follow up of bilateral lumbar medial branch rhizotomies at L3, L4, and L5 performed on January 12, 2022.  She reports experiencing no significant pain relief or reduction in her pain levels.  She did participate in physical therapy for a few sessions, and now continues learned home exercises.  She did not undergo consultation with [facility] weight loss, but is currently being worked up for potential bariatric surgery.  Pain History: Ms. Keyla Davila, 57 y.o. female originally referred by Alissa Benavides PA-C in consultation for chronic intractable lower back pain. Patient reports a longstanding history of lower back pain, which began without incident.  She reports over the past year her pain has increased significantly.  MRI of the lumbar spine on 6/21/2021, revealed diffuse degenerative disc disease with facet arthritis.  At L4-L5 there is moderate spinal canal and bilateral neuroforaminal stenosis.  At L5-S1 there is advanced degenerative disc disease with facet arthritis with bilateral neural foraminal stenosis.  She is failed to obtain pain relief with oral analgesics, physical therapy, to name a few.  She underwent neurosurgical consultation with Alissa Benavides PA-C on 8/30/2021, and was found not to be a surgical candidate.  She underwent follow-up consultation with neurosurgery with Mahi Perkins PA-C 2/14/2021, and was additionally found not to be a surgical candidate.    Pain Description: constant pain with intermittent exacerbation, described as aching, burning and throbbing sensation.   Radiation of Pain:  The pain radiates into the bilateral gluteal region.  Pain intensity today: 7/10  Average pain intensity last week: 6/10  Pain intensity ranges from: 6/10 to 8/10  Aggravating factors: Pain increases with bending, twisting, standing, walking. " Patient describes neurogenic claudication.    Alleviating factors: Pain decreases with sitting down, lying down   Associated Symptoms:   Patient denies pain, numbness, or weakness in the lower extremities, except for some tingling in her lateral thighs.  Patient denies any new bladder or bowel problems.   Patient reports difficulties with her balance but denies recent falls.     Review of previous therapies and additional medical records:  Kyela Davila has already failed the following measures, including:   Conservative measures: oral analgesics, topical analgesics, physical therapy, ice and heat   Interventional: 01/12/2022: Bilateral lumbar medial branch rhizotomies.  Surgical: No history of previous lumbar spine or hip surgery.  Keyla Davila underwent neurosurgical  consultation with Alissa Benavides PA-C on 08/30/2021, and was found not to be a surgical candidate.  Keyla Davila presents with significant comorbidities including anxiety depression, osteoarthritis, morbid obesity, hypertension, history of DVT on Xarelto.  In terms of current analgesics, Keyla Davila takes: Gabapentin, tramadol.  Patient also takes Xanax, doxepin, hydroxyzine, Trintellix, and Vraylar.  I have reviewed her Josr Report is consistent with medication reconciliation.    Global Pain Scale 10-12  2021          Pain 23          Feelings 22          Clinical outcomes 22          Activities 24          GPS Total: 91              Review of Diagnostic Studies:   MRI of the lumbar spine without contrast 6/21/2021: Imaging was reviewed.  Vertebral body heights are well-maintained there is no evidence of malalignment.  Multilevel degenerative disc disease with facet arthritis.  T12-L1, L1-L2: Right paracentral disc protrusion with mild lateral recess and foraminal stenosis.  L2-L3: Disc bulge with facet hypertrophy and ligamentum flavum thickening, contributing to mild bilateral neural foraminal stenosis.  L3-L4: Disc bulge  with facet hypertrophy.  There is increased fluid signal within the facet joints.  Ligamentum flavum thickening contributing to mild to moderate bilateral neuroforaminal stenosis.  L4-L5: Disc bulge with facet hypertrophy.  There is increased fluid signal in the facet joints.  Ligamentum flavum hypertrophy with moderate bilateral neural foraminal stenosis and bilateral lateral recess stenosis.  L5-S1: Severe degenerative disc disease with hypertrophic endplate changes, facet and ligamentum flavum hypertrophy, with moderate left neural foraminal stenosis.  Lumbar spine x-rays 5/19/2021: Imaging was reviewed.  Diffuse degenerative disc disease with facet arthritis, from T12-L1 through L5-S1, most severe at L5-S1.        Review of Systems   Constitutional: Positive for fatigue.   Respiratory: Positive for shortness of breath.    Musculoskeletal: Positive for back pain and myalgias.   Allergic/Immunologic: Positive for environmental allergies.   Hematological: Bruises/bleeds easily.   Psychiatric/Behavioral: Positive for sleep disturbance. The patient is nervous/anxious.    All other systems reviewed and are negative.       Patient Active Problem List   Diagnosis   • Spondylosis of lumbar region without myelopathy or radiculopathy   • Degeneration of lumbar or lumbosacral intervertebral disc   • Lumbar stenosis with neurogenic claudication   • Morbid obesity (HCC)   • Anxiety and depression   • Physical deconditioning   • Chronic anticoagulation   • Gait disturbance   • Lymphedema of both lower extremities   • Meralgia paraesthetica, right   • Hypertension   • HLD (hyperlipidemia)   • Acute deep vein thrombosis (DVT) of distal vein of right lower extremity (HCC)   • Dyspepsia   • Kidney infection   • Chronic low back pain   • Endometriosis   • PCOS (polycystic ovarian syndrome)   • Palpitations       Past Medical History:   Diagnosis Date   • Acute deep vein thrombosis (DVT) of distal vein of right lower extremity ([facility])  2017    DVT x 1; has had 2 superficial vein clots since. On Xarelto   • Anxiety and depression     currently sees psychiatrist and counselor   • Arthritis    • Back problem    • Chronic low back pain     DDD; takes gabapentin; PRN Tylenol.   • Dyspepsia    • Endometriosis     s/p partial hysterectomy   • HLD (hyperlipidemia)    • Hx of bronchitis    • Hx of viral pneumonia    • Hypertension    • Hypertension    • Kidney infection     bilateral; resulted in sepsis and admission x 2 weeks   • Palpitations     Sees Dr. Alvares   • PCOS (polycystic ovarian syndrome)     s/p R oopherectomy         Past Surgical History:   Procedure Laterality Date   • BROW LIFT Bilateral 2022   •  SECTION  1987, ; lower transverse incision   • DIAGNOSTIC LAPAROSCOPY      x5; last one in ; due to PCOS and endometriosis   • FACIAL COSMETIC SURGERY     • HAND SURGERY Left    • LAPAROSCOPIC HYSTERECTOMY      partial; R oopherectomy- still has L ovary   • TONSILLECTOMY           Family History   Problem Relation Age of Onset   • Arthritis Mother    • Cancer Mother    • Heart disease Mother    • Hypertension Mother    • Liver disease Mother    • Kidney disease Mother    • Heart disease Father    • Hypertension Father    • Alcohol abuse Father    • Cirrhosis Father    • Rheum arthritis Sister    • Clotting disorder Brother    • Drug abuse Daughter    • Cancer Maternal Grandfather    • Hearing loss Maternal Grandfather    • Hypertension Maternal Grandfather    • Cancer Paternal Grandfather          Social History     Socioeconomic History   • Marital status:    Tobacco Use   • Smoking status: Never Smoker   • Smokeless tobacco: Never Used   Vaping Use   • Vaping Use: Never used   Substance and Sexual Activity   • Alcohol use: Yes     Comment: Occ   • Drug use: Never   • Sexual activity: Defer           Current Outpatient Medications:   •  ALPRAZolam (XANAX) 0.5 MG tablet, Take 0.5 mg by  "mouth 3 (Three) Times a Day., Disp: , Rfl:   •  bisoprolol (ZEBeta) 10 MG tablet, Take 10 mg by mouth 2 (two) times a day., Disp: , Rfl:   •  Cholecalciferol 50 MCG (2000 UT) capsule, Take 2,000 Units by mouth Daily., Disp: , Rfl:   •  doxepin (SINEquan) 100 MG capsule, Take 100 mg by mouth 2 (two) times a day., Disp: , Rfl:   •  gabapentin (NEURONTIN) 300 MG capsule, Take 1 capsule by mouth 3 (Three) Times a Day., Disp: 90 capsule, Rfl: 0  •  hydrOXYzine pamoate (VISTARIL) 50 MG capsule, Take 100 mg by mouth 2 (Two) Times a Day., Disp: , Rfl:   •  potassium chloride (K-DUR,KLOR-CON) 20 MEQ CR tablet, , Disp: , Rfl:   •  Trintellix 20 MG tablet, Take 10 mg by mouth Daily., Disp: , Rfl:   •  Vraylar 1.5 MG capsule capsule, Take 1.5 mg by mouth Daily., Disp: , Rfl:   •  Xarelto 20 MG tablet, Take 20 mg by mouth Daily., Disp: , Rfl:   •  metOLazone (ZAROXOLYN) 2.5 MG tablet, , Disp: , Rfl:       No Known Allergies         /80   Pulse 71   Resp 16   Ht 162.6 cm (64\")   Wt 134 kg (294 lb 9.6 oz)   SpO2 97%   BMI 50.57 kg/m²       Physical Exam   Constitutional: Patient appears, well-developed, well-nourished, well-hydrated   HEENT: Head: Normocephalic and atraumatic  Eyes: Conjunctivae and lids are normal  Pupils: Equal, round, reactive to light  Neck: Trachea normal. Neck supple. No JVD present.   Pulmonary: No increased work of breathing or signs of respiratory distress.   Peripheral vascular exam: Femoral: right 2+, left 2+. Posterior tibialis: right 2+ and left 2+. Dorsalis pedis: right 2+ and left 2+. 2+ edema.   Musculoskeletal   Gait and station: Gait evaluation demonstrated a normal gait  Lumbar spine: Passive and active range of motion are limited secondary to pain. Extension and flexion of the lumbar spine increased and reproduced pain. Lumbar facet joint loading maneuvers are positive.  Maxi test, Gaenslen's test, SI compression test, and Yeoman's test negative.   Piriformis maneuvers are " negative.    Palpation of the bilateral ischial tuberosities, unrevealing.    Palpation of the bilateral greater trochanter, unrevealing.    Examination of the Iliotibial band: unrevealing.    Hip joints: The range of motion of the hip joints is slightly limited without pain    Neurological:   Patient is alert and oriented to person, place, and time.   Speech: Normal.   Cortical function: Normal mental status.   Cranial nerves 2-12: intact.   Reflex Scores:  Right patellar: 0+  Left patellar: 0+  Right Achilles: 0+  Left Achilles: 0+  Motor strength: 5/5  Motor Tone: Normal  Involuntary movements: None.   Superficial/Primitive Reflexes: Primitive reflexes were absent.   Right Herring: Absent  Left Herring: Absent  Right ankle clonus: Absent  Left ankle clonus: Absent   Babinsky:  Absent  Long tract signs: Negative. Straight leg raising test: Negative. Femoral stretch sign: Negative.   Sensory exam: Intact to light touch, intact pain and temperature sensation, intact vibration sensation and normal proprioception.   Coordination: Normal finger to nose and heel to shin. Normal balance and negative Romberg's sign   Skin and subcutaneous tissue: Skin is warm and intact. No rash noted. No cyanosis.   Psychiatric: Judgment and insight: Normal. Recent and remote memory: Intact. Mood and affect: Normal.        ASSESSMENT:   1. Lumbar stenosis with neurogenic claudication    2. Spondylosis of lumbar region without myelopathy or radiculopathy    3. Meralgia paraesthetica, right    4. Morbid obesity (HCC)    5. Lymphedema of both lower extremities    6. Chronic anticoagulation    7. Anxiety and depression    8. Gait disturbance    9. Physical deconditioning        PLAN/MEDICAL DECISION MAKING: Patient presents with a longstanding history of lower back pain, which has significantly worsened over the past 7 months.  MRI of the lumbar spine revealed diffuse degenerative disc disease with facet arthritis.  There is increased fluid  signal in the facet joints from L3-L4 through L5-S1.  At L4-L5 there is moderate spinal canal and neural foraminal stenosis, at L5-S1 there is advanced degenerative disc disease as well as facet arthritis with neuroforaminal stenosis.  She has underwent neurosurgical consultation and was found not to be a surgical candidate.  She presents with a significant comorbidity, morbid obesity, which appears to be a significant contributing factor to her overall pain.  Upon physical examination, she appears to have some improvements in her mechanical back pain, she is struggling more with symptoms consistent with lumbar stenosis with neurogenic claudication.  Patient has failed to obtain pain relief with conservative measures such as oral analgesics, physical therapy, to name a few, as referenced above. I have reviewed all available patient's medical records as well as previous therapies as referenced above. I had a lengthy conversation with Ms. Keyla Davila regarding her chronic pain condition and potential therapeutic options including risks, benefits, alternative therapies, to name a few. Therefore, I have proposed the following plan:  1. Pharmacological measures: Reviewed and discussed;   A. Patient takes gabapentin and tramadol.  Patient also takes Xanax, doxepin, hydroxyzine, Trintellix, and Vraylar.  2. Interventional pain management measures: Patient will need to stop rivaroxaban (Xarelto) 15-20 mg po qday at least 72 hours between last dose and procedure (longer in renal impairment) (we will request clearance from Dr. Estrada) diagnostic and therapeutic bilateral L4-L5 transforaminal epidural steroid injection.  I have made the patient aware due to her current BMI of 50.5, she is not a candidate to receive conscious sedation per ACS guidelines.  She is going to keep working on weight loss, and hopefully by the time her procedure is performed, her BMI will be below 50.  We may repeat epidural depending on  patient's outcome.  She also reports some symptoms consistent with right meralgia paresthetica, which may consider diagnostic and therapeutic right lateral femoral cutaneous nerve block by hydrodissection technique under ultrasound and fluoroscopic guidance.  Patient will follow-up with Alissa Benavides PA-C thereafter.  3. Long-term rehabilitation efforts:  A. The patient does not have a history of falls. I did complete a risk assessment for falls.   B. Patient will start a comprehensive physical therapy program for core strengthening, gait and balance training, neurodynamics, ultrasound, myofascial release, cupping and dry needlingC. Start an exercise program such as water therapy  D. Contrast therapy: Apply ice-packs for 15-20 minutes, followed by heating pads for 15-20 minutes to affected area   E. Patient's Body mass index is 50.5 kg/m². indicating that patient is obese (BMI >30). Patient counseled on the importance of weight loss to help with overall health and pain control. Patient instructed to attempt weight loss.    4. The patient has been instructed to contact my office with any questions or difficulties. The patient understands the plan and agrees to proceed accordingly.    The patient has a documented plan of care to address chronic pain.   Keyla Davila reports a pain score of 7.  Given her pain assessment as noted, treatment options were discussed and the following options were decided upon as a follow-up plan to address the patient's pain: continuation of current treatment plan for pain, home exercises and therapy, referral to Physical Therapy and use of non-medical modalities (ice, heat, stretching and/or behavior modifications).       Pain Medications             doxepin (SINEquan) 100 MG capsule Take 100 mg by mouth 2 (two) times a day.    gabapentin (NEURONTIN) 300 MG capsule Take 1 capsule by mouth 3 (Three) Times a Day.    Trintellix 20 MG tablet Take 10 mg by mouth Daily.    Vraylar 1.5 MG  capsule capsule Take 1.5 mg by mouth Daily.           Patient Care Team:  Eloy Estrada MD as PCP - General (Family Medicine)     No orders of the defined types were placed in this encounter.        Future Appointments   Date Time Provider Department Center   6/9/2022  3:30 PM Dory Vaughn APRN [facility]  [facility]   6/13/2022  1:00 PM [facility] 1  [facility]   6/22/2022 11:30 AM Yomi Razo MD [facility]         [facility]     Please note that portions of this note were completed with a voice recognition program.

## 2022-06-09 ENCOUNTER — TELEMEDICINE (OUTPATIENT)
Dept: PSYCHIATRY | Facility: CLINIC | Age: 57
End: 2022-06-09

## 2022-06-09 DIAGNOSIS — Z71.89 ENCOUNTER FOR PSYCHOLOGICAL ASSESSMENT PRIOR TO BARIATRIC SURGERY: ICD-10-CM

## 2022-06-09 DIAGNOSIS — E66.01 MORBID OBESITY: Primary | ICD-10-CM

## 2022-06-09 PROCEDURE — 90792 PSYCH DIAG EVAL W/MED SRVCS: CPT | Performed by: NURSE PRACTITIONER
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
## 2022-06-13 ENCOUNTER — TELEPHONE (OUTPATIENT)
Dept: PAIN MEDICINE | Facility: CLINIC | Age: 57
End: 2022-06-13

## 2022-06-13 ENCOUNTER — HOSPITAL ENCOUNTER (OUTPATIENT)
Dept: ULTRASOUND IMAGING | Facility: HOSPITAL | Age: 57
Discharge: HOME | End: 2022-06-13
Admitting: PHYSICIAN ASSISTANT

## 2022-06-13 DIAGNOSIS — R10.13 DYSPEPSIA: ICD-10-CM

## 2022-06-13 PROCEDURE — 76705 ECHO EXAM OF ABDOMEN: CPT

## 2022-06-13 NOTE — TELEPHONE ENCOUNTER
Patient was made aware of clearance approved for her to stop her Xarelto 3 days prior to injection per Dr. Eloy Estrada.

## 2022-06-20 ENCOUNTER — TELEPHONE (OUTPATIENT)
Dept: BARIATRICS/WEIGHT MGMT | Facility: CLINIC | Age: 57
End: 2022-06-20

## 2022-06-20 NOTE — TELEPHONE ENCOUNTER
Pt had 2 questions:  Should she wait  for an hour after finishing a meal to drink--if this helps with satiety yes  How long should she wait after her last food intake prior to going to bed-- 3 hours after a meal, if it is a small snack such as greek yogurt or string cheese no issues    Lost 8 lbs so far  Encouraged pt and to call with any further q

## 2022-06-22 ENCOUNTER — OUTSIDE FACILITY SERVICE (OUTPATIENT)
Dept: PAIN MEDICINE | Facility: CLINIC | Age: 57
End: 2022-06-22

## 2022-06-22 PROCEDURE — 64483 NJX AA&/STRD TFRM EPI L/S 1: CPT | Performed by: ANESTHESIOLOGY

## 2022-06-22 PROCEDURE — 99152 MOD SED SAME PHYS/QHP 5/>YRS: CPT | Performed by: ANESTHESIOLOGY

## 2022-06-23 ENCOUNTER — TELEPHONE (OUTPATIENT)
Dept: PAIN MEDICINE | Facility: CLINIC | Age: 57
End: 2022-06-23

## 2022-06-23 NOTE — TELEPHONE ENCOUNTER
LVM for pt regarding yesterday’s procedure with Dr. Razo and how they are feeling.  Advised of follow up appointment, and to call the office as needed.

## 2022-08-03 DIAGNOSIS — R10.13 DYSPEPSIA: Primary | ICD-10-CM

## 2022-08-12 ENCOUNTER — HOSPITAL ENCOUNTER (OUTPATIENT)
Dept: MRI IMAGING | Facility: HOSPITAL | Age: 57
Discharge: HOME | End: 2022-08-12
Admitting: PHYSICIAN ASSISTANT

## 2022-08-12 DIAGNOSIS — R10.13 DYSPEPSIA: ICD-10-CM

## 2022-08-12 PROCEDURE — 74181 MRI ABDOMEN W/O CONTRAST: CPT

## 2022-08-30 DIAGNOSIS — R10.13 DYSPEPSIA: Primary | ICD-10-CM

## 2022-10-04 ENCOUNTER — HOSPITAL ENCOUNTER (OUTPATIENT)
Dept: NUCLEAR MEDICINE | Facility: HOSPITAL | Age: 57
Discharge: HOME | End: 2022-10-04

## 2022-10-04 VITALS — BODY MASS INDEX: 51.49 KG/M2 | WEIGHT: 293 LBS

## 2022-10-04 DIAGNOSIS — R10.13 DYSPEPSIA: ICD-10-CM

## 2022-10-04 PROCEDURE — 78227 HEPATOBIL SYST IMAGE W/DRUG: CPT

## 2022-10-04 PROCEDURE — 25010000002 SINCALIDE PER 5 MCG: Performed by: PHYSICIAN ASSISTANT

## 2022-10-04 PROCEDURE — 0 TECHNETIUM TC 99M MEBROFENIN KIT: Performed by: PHYSICIAN ASSISTANT

## 2022-10-04 PROCEDURE — A9537 TC99M MEBROFENIN: HCPCS | Performed by: PHYSICIAN ASSISTANT

## 2022-10-04 RX ORDER — KIT FOR THE PREPARATION OF TECHNETIUM TC 99M MEBROFENIN 45 MG/10ML
1 INJECTION, POWDER, LYOPHILIZED, FOR SOLUTION INTRAVENOUS
Status: COMPLETED | OUTPATIENT
Start: 2022-10-04 | End: 2022-10-04

## 2022-10-04 RX ADMIN — MEBROFENIN 1 DOSE: 45 INJECTION, POWDER, LYOPHILIZED, FOR SOLUTION INTRAVENOUS at 12:50

## 2022-10-04 RX ADMIN — SINCALIDE 2.7 MCG: 5 INJECTION, POWDER, LYOPHILIZED, FOR SOLUTION INTRAVENOUS at 14:20

## 2023-01-05 DIAGNOSIS — R06.00 DYSPNEA, UNSPECIFIED TYPE: ICD-10-CM

## 2023-01-05 DIAGNOSIS — R53.83 FATIGUE, UNSPECIFIED TYPE: Primary | ICD-10-CM

## 2023-01-16 ENCOUNTER — LAB (OUTPATIENT)
Dept: LAB | Facility: HOSPITAL | Age: 58
End: 2023-01-16
Payer: COMMERCIAL

## 2023-01-16 DIAGNOSIS — R06.00 DYSPNEA, UNSPECIFIED TYPE: ICD-10-CM

## 2023-01-16 DIAGNOSIS — R53.83 FATIGUE, UNSPECIFIED TYPE: ICD-10-CM

## 2023-01-16 LAB
ALBUMIN SERPL-MCNC: 4.4 G/DL (ref 3.5–5.2)
ALBUMIN/GLOB SERPL: 1.5 G/DL
ALP SERPL-CCNC: 103 U/L (ref 39–117)
ALT SERPL W P-5'-P-CCNC: 69 U/L (ref 1–33)
ANION GAP SERPL CALCULATED.3IONS-SCNC: 14 MMOL/L (ref 5–15)
AST SERPL-CCNC: 63 U/L (ref 1–32)
BILIRUB SERPL-MCNC: 0.8 MG/DL (ref 0–1.2)
BUN SERPL-MCNC: 12 MG/DL (ref 6–20)
BUN/CREAT SERPL: 9.6 (ref 7–25)
CALCIUM SPEC-SCNC: 9.7 MG/DL (ref 8.6–10.5)
CHLORIDE SERPL-SCNC: 104 MMOL/L (ref 98–107)
CO2 SERPL-SCNC: 25 MMOL/L (ref 22–29)
CREAT SERPL-MCNC: 1.25 MG/DL (ref 0.57–1)
DEPRECATED RDW RBC AUTO: 45.2 FL (ref 37–54)
EGFRCR SERPLBLD CKD-EPI 2021: 50.1 ML/MIN/1.73
ERYTHROCYTE [DISTWIDTH] IN BLOOD BY AUTOMATED COUNT: 13.8 % (ref 12.3–15.4)
GLOBULIN UR ELPH-MCNC: 2.9 GM/DL
GLUCOSE SERPL-MCNC: 123 MG/DL (ref 65–99)
HCT VFR BLD AUTO: 50.2 % (ref 34–46.6)
HGB BLD-MCNC: 16.7 G/DL (ref 12–15.9)
MCH RBC QN AUTO: 29.9 PG (ref 26.6–33)
MCHC RBC AUTO-ENTMCNC: 33.3 G/DL (ref 31.5–35.7)
MCV RBC AUTO: 89.8 FL (ref 79–97)
PLATELET # BLD AUTO: 209 10*3/MM3 (ref 140–450)
PMV BLD AUTO: 10.5 FL (ref 6–12)
POTASSIUM SERPL-SCNC: 4.2 MMOL/L (ref 3.5–5.2)
PROT SERPL-MCNC: 7.3 G/DL (ref 6–8.5)
RBC # BLD AUTO: 5.59 10*6/MM3 (ref 3.77–5.28)
SODIUM SERPL-SCNC: 143 MMOL/L (ref 136–145)
WBC NRBC COR # BLD: 7.48 10*3/MM3 (ref 3.4–10.8)

## 2023-01-16 PROCEDURE — 85027 COMPLETE CBC AUTOMATED: CPT

## 2023-01-16 PROCEDURE — 80053 COMPREHEN METABOLIC PANEL: CPT

## 2023-01-16 PROCEDURE — 36415 COLL VENOUS BLD VENIPUNCTURE: CPT

## 2023-01-20 ENCOUNTER — HOSPITAL ENCOUNTER (OUTPATIENT)
Dept: GENERAL RADIOLOGY | Facility: HOSPITAL | Age: 58
Discharge: HOME | End: 2023-01-20
Admitting: PHYSICIAN ASSISTANT
Payer: COMMERCIAL

## 2023-01-20 DIAGNOSIS — R06.00 DYSPNEA, UNSPECIFIED TYPE: ICD-10-CM

## 2023-01-20 DIAGNOSIS — R53.83 FATIGUE, UNSPECIFIED TYPE: ICD-10-CM

## 2023-01-20 PROCEDURE — 71046 X-RAY EXAM CHEST 2 VIEWS: CPT

## 2023-01-24 ENCOUNTER — CONSULT (OUTPATIENT)
Dept: BARIATRICS/WEIGHT MGMT | Facility: CLINIC | Age: 58
End: 2023-01-24
Payer: COMMERCIAL

## 2023-01-24 VITALS
WEIGHT: 293 LBS | OXYGEN SATURATION: 97 % | SYSTOLIC BLOOD PRESSURE: 144 MMHG | DIASTOLIC BLOOD PRESSURE: 88 MMHG | HEIGHT: 64 IN | RESPIRATION RATE: 18 BRPM | BODY MASS INDEX: 50.02 KG/M2 | TEMPERATURE: 97.6 F | HEART RATE: 101 BPM

## 2023-01-24 DIAGNOSIS — K81.1 CHRONIC CHOLECYSTITIS WITHOUT CALCULUS: ICD-10-CM

## 2023-01-24 PROBLEM — E66.01 MORBID OBESITY WITH BODY MASS INDEX (BMI) OF 50.0 TO 59.9 IN ADULT: Status: ACTIVE | Noted: 2023-01-24

## 2023-01-24 PROCEDURE — 99214 OFFICE O/P EST MOD 30 MIN: CPT | Performed by: SURGERY

## 2023-01-24 RX ORDER — SODIUM CHLORIDE 0.9 % (FLUSH) 0.9 %
3-10 SYRINGE (ML) INJECTION AS NEEDED
Status: CANCELLED | OUTPATIENT
Start: 2023-01-24

## 2023-01-24 RX ORDER — DULAGLUTIDE 0.75 MG/.5ML
0.75 INJECTION, SOLUTION SUBCUTANEOUS
COMMUNITY

## 2023-01-24 RX ORDER — ACETAMINOPHEN 500 MG
1000 TABLET ORAL ONCE
Status: CANCELLED | OUTPATIENT
Start: 2023-01-24 | End: 2023-01-24

## 2023-01-24 RX ORDER — SODIUM CHLORIDE 9 MG/ML
150 INJECTION, SOLUTION INTRAVENOUS CONTINUOUS
Status: CANCELLED | OUTPATIENT
Start: 2023-01-24

## 2023-01-24 RX ORDER — CHLORHEXIDINE GLUCONATE 0.12 MG/ML
30 RINSE ORAL
Status: CANCELLED | OUTPATIENT
Start: 2023-01-24 | End: 2023-01-24

## 2023-01-24 RX ORDER — PANTOPRAZOLE SODIUM 40 MG/10ML
40 INJECTION, POWDER, LYOPHILIZED, FOR SOLUTION INTRAVENOUS ONCE
Status: CANCELLED | OUTPATIENT
Start: 2023-01-24 | End: 2023-01-24

## 2023-01-24 RX ORDER — ENOXAPARIN SODIUM 150 MG/ML
40 INJECTION SUBCUTANEOUS ONCE
Status: CANCELLED | OUTPATIENT
Start: 2023-01-24 | End: 2023-01-24

## 2023-01-24 RX ORDER — SODIUM CHLORIDE 0.9 % (FLUSH) 0.9 %
3 SYRINGE (ML) INJECTION EVERY 12 HOURS SCHEDULED
Status: CANCELLED | OUTPATIENT
Start: 2023-01-24

## 2023-01-24 RX ORDER — SODIUM CHLORIDE 9 MG/ML
40 INJECTION, SOLUTION INTRAVENOUS AS NEEDED
Status: CANCELLED | OUTPATIENT
Start: 2023-01-24

## 2023-01-24 RX ORDER — FLUCONAZOLE 2 MG/ML
400 INJECTION, SOLUTION INTRAVENOUS EVERY 24 HOURS
Status: CANCELLED | OUTPATIENT
Start: 2023-01-24 | End: 2023-01-25

## 2023-01-24 RX ORDER — SCOLOPAMINE TRANSDERMAL SYSTEM 1 MG/1
1 PATCH, EXTENDED RELEASE TRANSDERMAL ONCE
Status: CANCELLED | OUTPATIENT
Start: 2023-01-24 | End: 2023-01-24

## 2023-01-24 RX ORDER — GABAPENTIN 100 MG/1
600 CAPSULE ORAL ONCE
Status: CANCELLED | OUTPATIENT
Start: 2023-01-24 | End: 2023-01-24

## 2023-01-24 NOTE — PROGRESS NOTES
[facility]IATR[facility] SURGERY  [address]  [address]  [facility] [id number] [phone]      Patient  Name:  Keyla Davila  :  1965      Date of Visit: 23    Chief Complaint:  weight gain; unable to maintain weight loss.   Evaluate for possible metabolic and bariatric surgery    History of Present Illness:  Keyla Davila is a 57 y.o. female who presents today for evaluation, education and consultation regarding metabolic and bariatric surgery (MBS).  Since last seen 2022 she has gained less than half a pound.  The patient returns for final visit prior to metabolic and bariatric surgery specifically the sleeve gastrectomy.  Original intake evaluation Nneka Lackey PA-C dated 2022 reviewed.    She notes the patient's maximum lifetime weight is 300 pounds and that her daughter passed away last year and she is raising her 2 young grandchildren and wants to live a healthier lifestyle for them and that she has some postprandial nausea and occasional abdominal pain without recent gallbladder evaluation no heartburn and had 5 diagnostic laparoscopies over the span of several years due to a significant history of endometriosis and PCOS most recently in  and that she has a history of DVT in 2017 and has had several episodes of superficial clots since that time and remains on daily Xarelto therapy managed by her PCP and has history of hypertension palpitations followed by Dr. Alvares cardiology at [facility] and that she has history of PCOS and endometriosis with subsequent partial hysterectomy and right oophorectomy and that she has anxiety and depression and routinely sees a psychiatrist and counselor and that her  smokes in the house.      The patient has had issues with morbid obesity for years and only temporary success with non-surgical methods of weight loss.  The patient is seeking LSG to help with the morbid obesity related conditions of  "history of DVT and multiple superficial thromboses on chronic anticoagulation, anxiety and depression, arthritis, chronic low back pain, endometriosis status post partial hysterectomy, hyperlipidemia, hypertension, chronic kidney disease stage III, hyperglycemia with elevated hemoglobin A1c, fatty liver with elevated transaminases, palpitations, history of PCOS, rectus diastases, right-sided chronic venous stasis changes, candidal intertrigo.    57-year-old super morbidly obese female from [place].  She says her primary care provider Dr. Estrada recommended me highly.  She says she knows several who have had the procedure and has thought about it over the years but decided to proceed because she is now taking care of her grandchildren since her daughter passed away and her  who is with her for today's evaluation is not in good health and she feels she needs to be in better health to raise her grandkids.  Her  says he smokes outside only and not in the cars and the patient knows to avoid secondhand smoke 2 weeks prior in 6 weeks after sleeve gastrectomy to minimize the risk of delayed leak.  She denies obstructive sleep apnea.  Her ultrasound report was unremarkable although they did not mention the presence or absence of gallstones.  [facility] HIDA scan showed ejection fraction of 81% however she says it did reproduce her postprandial epigastric pain and nausea and she would like to proceed with concomitant cholecystectomy.  She says her father  of cirrhosis but was an alcoholic and was obese.  Her psychosocial evaluation was \"fair\".      Past Medical History:   Diagnosis Date   • Acute deep vein thrombosis (DVT) of distal vein of right lower extremity (HCC) 2017    DVT x 1; has had 2 superficial vein clots since. On Xarelto   • Anxiety and depression     currently sees psychiatrist and counselor   • Arthritis    • Back problem    • Candidal intertrigo    • Chronic low back pain     DDD; takes " gabapentin; PRN Tylenol.   • Chronic venous stasis dermatitis     Right   • CKD (chronic kidney disease), stage III (HCC)    • Dyspepsia    • Elevated hemoglobin A1c    • Elevated transaminase level    • Endometriosis     s/p partial hysterectomy   • Fatty liver    • HLD (hyperlipidemia)    • Hx of bronchitis    • Hx of viral pneumonia    • Hypertension    • Hypertension    • Kidney infection     bilateral; resulted in sepsis and admission x 2 weeks   • Palpitations     Sees Dr. Alvares   • PCOS (polycystic ovarian syndrome)     s/p R oopherectomy   • Rectus diastasis      Past Surgical History:   Procedure Laterality Date   • BROW LIFT Bilateral 2022   •  SECTION  1987, ; lower transverse incision   • DIAGNOSTIC LAPAROSCOPY      x5; last one in ; due to PCOS and endometriosis   • FACIAL COSMETIC SURGERY     • HAND SURGERY Left    • LAPAROSCOPIC HYSTERECTOMY      partial; R oopherectomy- still has L ovary   • TONSILLECTOMY         No Known Allergies    Current Outpatient Medications:   •  ALPRAZolam (XANAX) 0.5 MG tablet, Take 0.5 mg by mouth 3 (Three) Times a Day., Disp: , Rfl:   •  bisoprolol (ZEBeta) 10 MG tablet, Take 10 mg by mouth 2 (two) times a day., Disp: , Rfl:   •  doxepin (SINEquan) 100 MG capsule, Take 100 mg by mouth Daily., Disp: , Rfl:   •  Dulaglutide (Trulicity) 0.75 MG/0.5ML solution pen-injector, Inject 0.75 mg under the skin into the appropriate area as directed Every 7 (Seven) Days. On , Disp: , Rfl:   •  gabapentin (NEURONTIN) 300 MG capsule, Take 1 capsule by mouth 3 (Three) Times a Day., Disp: 90 capsule, Rfl: 0  •  hydrOXYzine pamoate (VISTARIL) 50 MG capsule, Take 50 mg by mouth Daily., Disp: , Rfl:   •  potassium chloride (K-DUR,KLOR-CON) 20 MEQ CR tablet, Take 20 mEq by mouth Daily., Disp: , Rfl:   •  Vortioxetine HBr (TRINTELLIX) 10 MG tablet tablet, Take 10 mg by mouth Daily., Disp: , Rfl:   •  Vraylar 1.5 MG capsule capsule, Take  1.5 mg by mouth Daily., Disp: , Rfl:   •  Xarelto 20 MG tablet, Take 20 mg by mouth Daily., Disp: , Rfl:     Social History     Socioeconomic History   • Marital status:      Spouse name: Anand   • Number of children: 2   • Highest education level: Master's degree (e.g., MA, MS, Pina, MEd, MSW, STEPHANIE)   Tobacco Use   • Smoking status: Never   • Smokeless tobacco: Never   Vaping Use   • Vaping Use: Never used   Substance and Sexual Activity   • Alcohol use: Yes     Comment: Occ   • Drug use: Never   • Sexual activity: Defer     Family History   Problem Relation Age of Onset   • Arthritis Mother    • Cancer Mother    • Heart disease Mother    • Hypertension Mother    • Liver disease Mother    • Kidney disease Mother    • Heart disease Father    • Hypertension Father    • Alcohol abuse Father    • Cirrhosis Father    • Rheum arthritis Sister    • Clotting disorder Brother    • Alcohol abuse Paternal Uncle    • Cancer Maternal Grandfather    • Hearing loss Maternal Grandfather    • Hypertension Maternal Grandfather    • Cancer Paternal Grandfather    • Drug abuse Daughter        Review of Systems   Constitutional: Positive for fatigue and unexpected weight gain. Negative for chills, diaphoresis, fever and unexpected weight loss.   HENT: Negative for congestion and facial swelling.    Eyes: Negative for blurred vision, double vision and discharge.   Respiratory: Negative for chest tightness, shortness of breath and stridor.    Cardiovascular: Positive for palpitations. Negative for chest pain and leg swelling.   Gastrointestinal: Positive for abdominal pain and nausea. Negative for blood in stool.   Endocrine: Negative for polydipsia.   Genitourinary: Negative for hematuria.   Musculoskeletal: Positive for arthralgias and back pain.   Skin: Negative for color change.   Allergic/Immunologic: Negative for immunocompromised state.   Neurological: Negative for confusion.   Psychiatric/Behavioral: Negative for  self-injury. The patient is nervous/anxious.        I have reviewed the ROS and confirm that it's accurate today.    Physical Exam:  Vital Signs:  Weight: 134 kg (295 lb)   Body mass index is 51.44 kg/m².  Temp: 97.6 °F (36.4 °C)   Heart Rate: 101   BP: 144/88     Physical Exam  Vitals reviewed.   Constitutional:       Appearance: She is well-developed.   HENT:      Head: Normocephalic and atraumatic.      Nose:      Comments: mask  Eyes:      Conjunctiva/sclera: Conjunctivae normal.      Pupils: Pupils are equal, round, and reactive to light.   Neck:      Thyroid: No thyromegaly.      Vascular: No carotid bruit.      Trachea: No tracheal deviation.   Cardiovascular:      Rate and Rhythm: Regular rhythm. Tachycardia present.      Heart sounds: Normal heart sounds.   Pulmonary:      Effort: Pulmonary effort is normal. No respiratory distress.      Breath sounds: Normal breath sounds.   Abdominal:      General: There is no distension.      Palpations: Abdomen is soft.      Tenderness: There is no abdominal tenderness.      Comments: Large epigastric rectus diastases cannot exclude an associated periumbilical hernia.  Fungal rash with odor under pannus.   Musculoskeletal:         General: No deformity. Normal range of motion.      Cervical back: Normal range of motion and neck supple.   Skin:     General: Skin is warm and dry.      Findings: No rash.      Comments: Chronic venous stasis changes right lower extremity   Neurological:      Mental Status: She is alert and oriented to person, place, and time.      Cranial Nerves: No cranial nerve deficit.      Coordination: Coordination normal.   Psychiatric:         Behavior: Behavior normal.         Thought Content: Thought content normal.         Judgment: Judgment normal.         Patient Active Problem List   Diagnosis   • Spondylosis of lumbar region without myelopathy or radiculopathy   • Degeneration of lumbar or lumbosacral intervertebral disc   • Lumbar stenosis  with neurogenic claudication   • Morbid obesity (HCC)   • Anxiety and depression   • Physical deconditioning   • Chronic anticoagulation   • Gait disturbance   • Lymphedema of both lower extremities   • Meralgia paraesthetica, right   • Hypertension   • HLD (hyperlipidemia)   • Acute deep vein thrombosis (DVT) of distal vein of right lower extremity (HCC)   • Dyspepsia   • Kidney infection   • Chronic low back pain   • Endometriosis   • PCOS (polycystic ovarian syndrome)   • Palpitations   • Morbid obesity with body mass index (BMI) of 50.0 to 59.9 in adult ([facility])   • Chronic cholecystitis without calculus       Assessment:    Keyla Davila is a 57 y.o. year old female with medically complicated obesity.    Metabolic and bariatric surgery is deemed medically necessary given the following obesity related comorbidities including history of DVT and multiple superficial thromboses on chronic anticoagulation, anxiety and depression, arthritis, chronic low back pain, endometriosis status post partial hysterectomy, hyperlipidemia, hypertension, chronic kidney disease stage III, hyperglycemia with elevated hemoglobin A1c, fatty liver with elevated transaminases, palpitations, history of PCOS, rectus diastases, right-sided chronic venous stasis changes, candidal intertrigo with current Weight: 134 kg (295 lb) and Body mass index is 51.44 kg/m²..    Encounter Diagnoses   Name Primary?   • Body mass index (BMI) of 50-59.9 in adult ([facility]) Yes   • Chronic cholecystitis without calculus       Patient is aware that surgery is a tool, and that weight loss and improvement in comorbidities is not guaranteed but only seen in the context of appropriate use, follow up and physical activity.    The patient was present for an approximately a 2.5 hour discussion of the purpose of MBS, how MBS is a tool to assist in achieving weight loss goals, the most common complications and how best to avoid them, and the strategies for short and long  "term weight loss and improvement in comorbidities.  Ample opportunity to discuss questions was available both in group and during the time of individual examination.    I reviewed her Josr report showing multiple alprazolam and gabapentin, Hydrocodone x1.  Labs dated 1/16/2023 unremarkable CMP except for glucose 123 creatinine 1.25 ALT 69 AST 63 GFR is low at 50.1 unremarkable CBC except for elevated H&H at 16.7 and 50.2 with elevated RBC.  Chest x-ray dated 1/20/2023 no active process with mild cardiomegaly.  EKG dated 8/11/2022 normal sinus rhythm with right superior axis deviation and low voltage QRS signature illegible.  Letter pain management dated 6/1/2022 noting that she should stop her Xarelto at least 72 hours prior to any procedure and he planned transforaminal epidural injection in June 22, 2022.  Cardiology clearance dated 8/11/2022 Naun Alvares MD recommending stopping Xarelto 5 to 7 days prior to surgery.  Psychosocial evaluation dated 6/9/2022 ARLENE Villegas \"fair candidate\".  Dietitian evaluation dated 4/27/2022 Randi hackett RD noting her diet is low in protein high in processed food especially carbohydrates from fast food and other restaurants and lacking in fruits and vegetables that day she had a 20 ounce fountain Dr. Pepper.  Letter of support primary care provider LIVIA Estrada MD dated 1/2/2023.  Negative H. pylori breath test dated 4/27/2022 other labs from that date glucose 103 creatinine 1.38 GFR 44 AST 60 ALT 79 hemoglobin A1c 6.4 cholesterol 251 triglycerides 199  TSH.  Unremarkable CBC except for hematocrit of 46.7.  Ultrasound of the gallbladder dated 6/13/2022 noting fatty liver and a mildly dilated common bile duct up to 8 mm interestingly gallbladder was read as normal but no mention of presence or absence of stones.  [facility] HIDA scan dated 10/4/2022 showing ejection fraction of 81% with no mention of symptoms.  Renal clearance dated 4/11/2022 Zan Craig MD " "noting that the patient has chronic kidney disease stage III related to sepsis/pyelonephritis during November 2021 and recommended she keep her bariatric surgery appointment.  Please see scanned records that I have reviewed and signed off on today.  All of this in addition to the patient's unique history and exam has been taken into consideration in determining their appropriate candidacy for MBS.    Complications  of laparoscopic/possible robotic gastric sleeve were discussed. The patient is well aware of the potential complications of surgery that include but not limited to bleeding, infections, deep venous thrombosis, pulmonary embolism, pulmonary complications such as pneumonia, cardiac events, hernias, small bowel obstruction, damage to the spleen or other organs, bowel injury, disfiguring scars, failure to lose weight, need for additional surgery, conversion to an open procedure, and death. Patient is also aware of complications which apply in this particular procedure that can include but are not limited to a \"leak\" at the staple line which in some instances may require conversion to gastric bypass.    The patient is aware if a hiatal hernia is encountered, it likely will be repaired.  R/B/A Rx to hiatal hernia repair were discussed as outlined in our long consent form.  Briefly risks in addition to those for LSG include recurrent hernia, JOSE MANUEL, dysphagia, esophageal injury, pneumothorax, injury to the vagus nerves, injury to the thoracic duct, aorta or vena cava.    I discussed avoiding all tobacco products, nicotine,  and second hand smoke at least 2 weeks pre-operatively and 6 weeks post-operatively to minimize the risk of sleeve leak.  This included discussing the importance of avoiding even secondhand smoke as the risk of leak is increased.  Examples discussed:  Avoid going in a house or riding in a car where someone has previously smoked in the last 2 weeks and for 6 weeks postoperatively.  Avoid living " "in a house where someone smokes (even if it's in a separate room/patio/attached garage, etc.).   Avoid congregating with a group of people who are smoking even if it's outside.  It is OK to be around wood burning fires and barbecue.  I explained that I do not know if marijuana has a same effects but my overall recommendation is to avoid it for 2 weeks prior in 6 weeks after surgery.     Discussed the risks, benefits and alternative therapies at great length as outlined in our extensive consent forms, consent videos, and educational teaching process under the direction of the center's .    A copy of the patient's signed informed consent is on file.      Plan:    After evaluation today I think the patient is a reasonable candidate for laparoscopic sleeve gastrectomy, cholecystectomy, and EGD.  Consider liver biopsy depending on the liver appearance at the time of surgery.  Add PT/INR to PAT labs.  Instructed to hold Xarelto 5 days prior to surgery as recommended by cardiology, no bridging anticoagulation recommended.  We discussed that she is at increased risk for cirrhosis, current meld score assuming a normal INR of 1.0 is 9.  I am somewhat concerned about her understanding of some of the concepts discussed and informed consent, especially avoiding high fructose corn syrup and getting enough protein.  She admits to a lot of fast food and pizza.  Always a concern with \"fair\" psychosocial evaluation.  Consider perioperative antifungal treatment.  Discussed that if she does have an associated incisional hernia with her giant rectus diastases we will likely leave this alone and plan future repair by general surgery after significant weight loss.  Other issues include history of DVT and multiple superficial thromboses on chronic anticoagulation, anxiety and depression, arthritis, chronic low back pain, endometriosis status post partial hysterectomy, hyperlipidemia, hypertension, chronic kidney disease " stage III, hyperglycemia with elevated hemoglobin A1c, fatty liver with elevated transaminases, palpitations, history of PCOS, rectus diastases, right-sided chronic venous stasis changes, candidal intertrigo.    Thank you Dr. Estrada for the opportunity evaluate Mrs. Davila.    Addendum: PT/INR returned at 20.7 and 1.78.  Calculating her MELD score based on labs dated 1/16/2023 Is 15 equating to a 6% 3-month mortality and essentially consistent with liver disease.  I suspect she likely does have cirrhosis and is a higher but not prohibitive risk candidate for planned surgery.  We will hold preoperative Lovenox.    Bhupendra Vee MD

## 2023-01-24 NOTE — LETTER
2023     Eloy Estrada MD  [address]  [address]  [place] [address]    Patient: Keyla Davila   YOB: 1965   Date of Visit: 2023       Dear Dr. Sean MD:    Thank you for referring Keyla Davila to me for evaluation. Below are the relevant portions of my assessment and plan of care.    If you have questions, please do not hesitate to call me. I look forward to following Keyla along with you.         Sincerely,        Bhupendra Vee MD        CC: No Recipients  Bhupendra Vee MD  23 1532  Signed  [facility] BARIATR[facility] SURGERY  [address]    [facility] [address] [phone]      Patient  Name:  Keyla Davila  :  1965      Date of Visit: 23    Chief Complaint:  weight gain; unable to maintain weight loss.   Evaluate for possible metabolic and bariatric surgery    History of Present Illness:  Keyla Davila is a 57 y.o. female who presents today for evaluation, education and consultation regarding metabolic and bariatric surgery (MBS).  Since last seen 2022 she has gained less than half a pound.  The patient returns for final visit prior to metabolic and bariatric surgery specifically the sleeve gastrectomy.  Original intake evaluation Nneka Lackey PA-C dated 2022 reviewed.    She notes the patient's maximum lifetime weight is 300 pounds and that her daughter passed away last year and she is raising her 2 young grandchildren and wants to live a healthier lifestyle for them and that she has some postprandial nausea and occasional abdominal pain without recent gallbladder evaluation no heartburn and had 5 diagnostic laparoscopies over the span of several years due to a significant history of endometriosis and PCOS most recently in  and that she has a history of DVT in 2017 and has had several episodes of superficial clots since that time and remains on daily Xarelto therapy managed  by her PCP and has history of hypertension palpitations followed by Dr. Alvares cardiology at [facility] and that she has history of PCOS and endometriosis with subsequent partial hysterectomy and right oophorectomy and that she has anxiety and depression and routinely sees a psychiatrist and counselor and that her  smokes in the house.      The patient has had issues with morbid obesity for years and only temporary success with non-surgical methods of weight loss.  The patient is seeking LSG to help with the morbid obesity related conditions of history of DVT and multiple superficial thromboses on chronic anticoagulation, anxiety and depression, arthritis, chronic low back pain, endometriosis status post partial hysterectomy, hyperlipidemia, hypertension, chronic kidney disease stage III, hyperglycemia with elevated hemoglobin A1c, fatty liver with elevated transaminases, palpitations, history of PCOS, rectus diastases, right-sided chronic venous stasis changes, candidal intertrigo.    57-year-old super morbidly obese female from [place].  She says her primary care provider Dr. Estrada recommended me highly.  She says she knows several who have had the procedure and has thought about it over the years but decided to proceed because she is now taking care of her grandchildren since her daughter passed away and her  who is with her for today's evaluation is not in good health and she feels she needs to be in better health to raise her grandkids.  Her  says he smokes outside only and not in the cars and the patient knows to avoid secondhand smoke 2 weeks prior in 6 weeks after sleeve gastrectomy to minimize the risk of delayed leak.  She denies obstructive sleep apnea.  Her ultrasound report was unremarkable although they did not mention the presence or absence of gallstones.  [facility] HIDA scan showed ejection fraction of 81% however she says it did reproduce her postprandial epigastric pain  "and nausea and she would like to proceed with concomitant cholecystectomy.  She says her father  of cirrhosis but was an alcoholic and was obese.  Her psychosocial evaluation was \"fair\".      Past Medical History:   Diagnosis Date   • Acute deep vein thrombosis (DVT) of distal vein of right lower extremity (HCC) 2017    DVT x 1; has had 2 superficial vein clots since. On Xarelto   • Anxiety and depression     currently sees psychiatrist and counselor   • Arthritis    • Back problem    • Candidal intertrigo    • Chronic low back pain     DDD; takes gabapentin; PRN Tylenol.   • Chronic venous stasis dermatitis     Right   • CKD (chronic kidney disease), stage III (HCC)    • Dyspepsia    • Elevated hemoglobin A1c    • Elevated transaminase level    • Endometriosis     s/p partial hysterectomy   • Fatty liver    • HLD (hyperlipidemia)    • Hx of bronchitis    • Hx of viral pneumonia    • Hypertension    • Hypertension    • Kidney infection     bilateral; resulted in sepsis and admission x 2 weeks   • Palpitations     Sees Dr. Alvares   • PCOS (polycystic ovarian syndrome)     s/p R oopherectomy   • Rectus diastasis      Past Surgical History:   Procedure Laterality Date   • BROW LIFT Bilateral 2022   •  SECTION  1987    , ; lower transverse incision   • DIAGNOSTIC LAPAROSCOPY      x5; last one in ; due to PCOS and endometriosis   • FACIAL COSMETIC SURGERY     • HAND SURGERY Left    • LAPAROSCOPIC HYSTERECTOMY      partial; R oopherectomy- still has L ovary   • TONSILLECTOMY         No Known Allergies    Current Outpatient Medications:   •  ALPRAZolam (XANAX) 0.5 MG tablet, Take 0.5 mg by mouth 3 (Three) Times a Day., Disp: , Rfl:   •  bisoprolol (ZEBeta) 10 MG tablet, Take 10 mg by mouth 2 (two) times a day., Disp: , Rfl:   •  doxepin (SINEquan) 100 MG capsule, Take 100 mg by mouth Daily., Disp: , Rfl:   •  Dulaglutide (Trulicity) 0.75 MG/0.5ML solution pen-injector, " Inject 0.75 mg under the skin into the appropriate area as directed Every 7 (Seven) Days. On Mondays, Disp: , Rfl:   •  gabapentin (NEURONTIN) 300 MG capsule, Take 1 capsule by mouth 3 (Three) Times a Day., Disp: 90 capsule, Rfl: 0  •  hydrOXYzine pamoate (VISTARIL) 50 MG capsule, Take 50 mg by mouth Daily., Disp: , Rfl:   •  potassium chloride (K-DUR,KLOR-CON) 20 MEQ CR tablet, Take 20 mEq by mouth Daily., Disp: , Rfl:   •  Vortioxetine HBr (TRINTELLIX) 10 MG tablet tablet, Take 10 mg by mouth Daily., Disp: , Rfl:   •  Vraylar 1.5 MG capsule capsule, Take 1.5 mg by mouth Daily., Disp: , Rfl:   •  Xarelto 20 MG tablet, Take 20 mg by mouth Daily., Disp: , Rfl:     Social History     Socioeconomic History   • Marital status:      Spouse name: Anand   • Number of children: 2   • Highest education level: Master's degree (e.g., MA, MS, Pina, MEd, MSW, STEPHANIE)   Tobacco Use   • Smoking status: Never   • Smokeless tobacco: Never   Vaping Use   • Vaping Use: Never used   Substance and Sexual Activity   • Alcohol use: Yes     Comment: Occ   • Drug use: Never   • Sexual activity: Defer     Family History   Problem Relation Age of Onset   • Arthritis Mother    • Cancer Mother    • Heart disease Mother    • Hypertension Mother    • Liver disease Mother    • Kidney disease Mother    • Heart disease Father    • Hypertension Father    • Alcohol abuse Father    • Cirrhosis Father    • Rheum arthritis Sister    • Clotting disorder Brother    • Alcohol abuse Paternal Uncle    • Cancer Maternal Grandfather    • Hearing loss Maternal Grandfather    • Hypertension Maternal Grandfather    • Cancer Paternal Grandfather    • Drug abuse Daughter        Review of Systems   Constitutional: Positive for fatigue and unexpected weight gain. Negative for chills, diaphoresis, fever and unexpected weight loss.   HENT: Negative for congestion and facial swelling.    Eyes: Negative for blurred vision, double vision and discharge.   Respiratory:  Negative for chest tightness, shortness of breath and stridor.    Cardiovascular: Positive for palpitations. Negative for chest pain and leg swelling.   Gastrointestinal: Positive for abdominal pain and nausea. Negative for blood in stool.   Endocrine: Negative for polydipsia.   Genitourinary: Negative for hematuria.   Musculoskeletal: Positive for arthralgias and back pain.   Skin: Negative for color change.   Allergic/Immunologic: Negative for immunocompromised state.   Neurological: Negative for confusion.   Psychiatric/Behavioral: Negative for self-injury. The patient is nervous/anxious.        I have reviewed the ROS and confirm that it's accurate today.    Physical Exam:  Vital Signs:  Weight: 134 kg (295 lb)   Body mass index is 51.44 kg/m².  Temp: 97.6 °F (36.4 °C)   Heart Rate: 101   BP: 144/88     Physical Exam  Vitals reviewed.   Constitutional:       Appearance: She is well-developed.   HENT:      Head: Normocephalic and atraumatic.      Nose:      Comments: mask  Eyes:      Conjunctiva/sclera: Conjunctivae normal.      Pupils: Pupils are equal, round, and reactive to light.   Neck:      Thyroid: No thyromegaly.      Vascular: No carotid bruit.      Trachea: No tracheal deviation.   Cardiovascular:      Rate and Rhythm: Regular rhythm. Tachycardia present.      Heart sounds: Normal heart sounds.   Pulmonary:      Effort: Pulmonary effort is normal. No respiratory distress.      Breath sounds: Normal breath sounds.   Abdominal:      General: There is no distension.      Palpations: Abdomen is soft.      Tenderness: There is no abdominal tenderness.      Comments: Large epigastric rectus diastases cannot exclude an associated periumbilical hernia.  Fungal rash with odor under pannus.   Musculoskeletal:         General: No deformity. Normal range of motion.      Cervical back: Normal range of motion and neck supple.   Skin:     General: Skin is warm and dry.      Findings: No rash.      Comments: Chronic  venous stasis changes right lower extremity   Neurological:      Mental Status: She is alert and oriented to person, place, and time.      Cranial Nerves: No cranial nerve deficit.      Coordination: Coordination normal.   Psychiatric:         Behavior: Behavior normal.         Thought Content: Thought content normal.         Judgment: Judgment normal.         Patient Active Problem List   Diagnosis   • Spondylosis of lumbar region without myelopathy or radiculopathy   • Degeneration of lumbar or lumbosacral intervertebral disc   • Lumbar stenosis with neurogenic claudication   • Morbid obesity (HCC)   • Anxiety and depression   • Physical deconditioning   • Chronic anticoagulation   • Gait disturbance   • Lymphedema of both lower extremities   • Meralgia paraesthetica, right   • Hypertension   • HLD (hyperlipidemia)   • Acute deep vein thrombosis (DVT) of distal vein of right lower extremity (HCC)   • Dyspepsia   • Kidney infection   • Chronic low back pain   • Endometriosis   • PCOS (polycystic ovarian syndrome)   • Palpitations   • Morbid obesity with body mass index (BMI) of 50.0 to 59.9 in adult ([facility])   • Chronic cholecystitis without calculus       Assessment:    Keyla Davila is a 57 y.o. year old female with medically complicated obesity.    Metabolic and bariatric surgery is deemed medically necessary given the following obesity related comorbidities including history of DVT and multiple superficial thromboses on chronic anticoagulation, anxiety and depression, arthritis, chronic low back pain, endometriosis status post partial hysterectomy, hyperlipidemia, hypertension, chronic kidney disease stage III, hyperglycemia with elevated hemoglobin A1c, fatty liver with elevated transaminases, palpitations, history of PCOS, rectus diastases, right-sided chronic venous stasis changes, candidal intertrigo with current Weight: 134 kg (295 lb) and Body mass index is 51.44 kg/m²..    Encounter Diagnoses   Name  "Primary?   • Body mass index (BMI) of 50-59.9 in adult (HCC) Yes   • Chronic cholecystitis without calculus       Patient is aware that surgery is a tool, and that weight loss and improvement in comorbidities is not guaranteed but only seen in the context of appropriate use, follow up and physical activity.    The patient was present for an approximately a 2.5 hour discussion of the purpose of MBS, how MBS is a tool to assist in achieving weight loss goals, the most common complications and how best to avoid them, and the strategies for short and long term weight loss and improvement in comorbidities.  Ample opportunity to discuss questions was available both in group and during the time of individual examination.    I reviewed her Josr report showing multiple alprazolam and gabapentin, Hydrocodone x1.  Labs dated 1/16/2023 unremarkable CMP except for glucose 123 creatinine 1.25 ALT 69 AST 63 GFR is low at 50.1 unremarkable CBC except for elevated H&H at 16.7 and 50.2 with elevated RBC.  Chest x-ray dated 1/20/2023 no active process with mild cardiomegaly.  EKG dated 8/11/2022 normal sinus rhythm with right superior axis deviation and low voltage QRS signature illegible.  Letter pain management dated 6/1/2022 noting that she should stop her Xarelto at least 72 hours prior to any procedure and he planned transforaminal epidural injection in June 22, 2022.  Cardiology clearance dated 8/11/2022 Naun Alvares MD recommending stopping Xarelto 5 to 7 days prior to surgery.  Psychosocial evaluation dated 6/9/2022 ARLENE Villegas \"fair candidate\".  Dietitian evaluation dated 4/27/2022 Randi hackett RD noting her diet is low in protein high in processed food especially carbohydrates from fast food and other restaurants and lacking in fruits and vegetables that day she had a 20 ounce fountain Dr. Pepper.  Letter of support primary care provider LIVIA Estrada MD dated 1/2/2023.  Negative H. pylori breath test " "dated 4/27/2022 other labs from that date glucose 103 creatinine 1.38 GFR 44 AST 60 ALT 79 hemoglobin A1c 6.4 cholesterol 251 triglycerides 199  TSH.  Unremarkable CBC except for hematocrit of 46.7.  Ultrasound of the gallbladder dated 6/13/2022 noting fatty liver and a mildly dilated common bile duct up to 8 mm interestingly gallbladder was read as normal but no mention of presence or absence of stones.  [facility] HIDA scan dated 10/4/2022 showing ejection fraction of 81% with no mention of symptoms.  Renal clearance dated 4/11/2022 Zan Craig MD noting that the patient has chronic kidney disease stage III related to sepsis/pyelonephritis during November 2021 and recommended she keep her bariatric surgery appointment.  Please see scanned records that I have reviewed and signed off on today.  All of this in addition to the patient's unique history and exam has been taken into consideration in determining their appropriate candidacy for MBS.    Complications  of laparoscopic/possible robotic gastric sleeve were discussed. The patient is well aware of the potential complications of surgery that include but not limited to bleeding, infections, deep venous thrombosis, pulmonary embolism, pulmonary complications such as pneumonia, cardiac events, hernias, small bowel obstruction, damage to the spleen or other organs, bowel injury, disfiguring scars, failure to lose weight, need for additional surgery, conversion to an open procedure, and death. Patient is also aware of complications which apply in this particular procedure that can include but are not limited to a \"leak\" at the staple line which in some instances may require conversion to gastric bypass.    The patient is aware if a hiatal hernia is encountered, it likely will be repaired.  R/B/A Rx to hiatal hernia repair were discussed as outlined in our long consent form.  Briefly risks in addition to those for LSG include recurrent hernia, JOSE MANUEL, dysphagia, " esophageal injury, pneumothorax, injury to the vagus nerves, injury to the thoracic duct, aorta or vena cava.    I discussed avoiding all tobacco products, nicotine,  and second hand smoke at least 2 weeks pre-operatively and 6 weeks post-operatively to minimize the risk of sleeve leak.  This included discussing the importance of avoiding even secondhand smoke as the risk of leak is increased.  Examples discussed:  Avoid going in a house or riding in a car where someone has previously smoked in the last 2 weeks and for 6 weeks postoperatively.  Avoid living in a house where someone smokes (even if it's in a separate room/patio/attached garage, etc.).   Avoid congregating with a group of people who are smoking even if it's outside.  It is OK to be around wood burning fires and barbecue.  I explained that I do not know if marijuana has a same effects but my overall recommendation is to avoid it for 2 weeks prior in 6 weeks after surgery.     Discussed the risks, benefits and alternative therapies at great length as outlined in our extensive consent forms, consent videos, and educational teaching process under the direction of the center's .    A copy of the patient's signed informed consent is on file.      Plan:    After evaluation today I think the patient is a reasonable candidate for laparoscopic sleeve gastrectomy, cholecystectomy, and EGD.  Consider liver biopsy depending on the liver appearance at the time of surgery.  Add PT/INR to PAT labs.  Instructed to hold Xarelto 5 days prior to surgery as recommended by cardiology, no bridging anticoagulation recommended.  We discussed that she is at increased risk for cirrhosis, current meld score assuming a normal INR of 1.0 is 9.  I am somewhat concerned about her understanding of some of the concepts discussed and informed consent, especially avoiding high fructose corn syrup and getting enough protein.  She admits to a lot of fast food and pizza.  " Always a concern with \"fair\" psychosocial evaluation.  Consider perioperative antifungal treatment.  Discussed that if she does have an associated incisional hernia with her giant rectus diastases we will likely leave this alone and plan future repair by general surgery after significant weight loss.  Other issues include history of DVT and multiple superficial thromboses on chronic anticoagulation, anxiety and depression, arthritis, chronic low back pain, endometriosis status post partial hysterectomy, hyperlipidemia, hypertension, chronic kidney disease stage III, hyperglycemia with elevated hemoglobin A1c, fatty liver with elevated transaminases, palpitations, history of PCOS, rectus diastases, right-sided chronic venous stasis changes, candidal intertrigo.    Thank you Dr. Estrada for the opportunity evaluate Mrs. Davila.      Bhupendra Vee MD                "

## 2023-01-25 PROBLEM — K81.1 CHRONIC CHOLECYSTITIS WITHOUT CALCULUS: Status: ACTIVE | Noted: 2023-01-25

## 2023-02-09 ENCOUNTER — PRE-ADMISSION TESTING (OUTPATIENT)
Dept: PREADMISSION TESTING | Facility: HOSPITAL | Age: 58
End: 2023-02-09
Payer: COMMERCIAL

## 2023-02-09 DIAGNOSIS — K81.1 CHRONIC CHOLECYSTITIS WITHOUT CALCULUS: ICD-10-CM

## 2023-02-09 LAB
ABO GROUP BLD: NORMAL
DEPRECATED RDW RBC AUTO: 43.4 FL (ref 37–54)
ERYTHROCYTE [DISTWIDTH] IN BLOOD BY AUTOMATED COUNT: 13.5 % (ref 12.3–15.4)
HBA1C MFR BLD: 5.9 % (ref 4.8–5.6)
HCT VFR BLD AUTO: 49.7 % (ref 34–46.6)
HGB BLD-MCNC: 16.5 G/DL (ref 12–15.9)
MCH RBC QN AUTO: 29.3 PG (ref 26.6–33)
MCHC RBC AUTO-ENTMCNC: 33.2 G/DL (ref 31.5–35.7)
MCV RBC AUTO: 88.1 FL (ref 79–97)
PLATELET # BLD AUTO: 211 10*3/MM3 (ref 140–450)
PMV BLD AUTO: 11.2 FL (ref 6–12)
POTASSIUM SERPL-SCNC: 3.5 MMOL/L (ref 3.5–5.2)
RBC # BLD AUTO: 5.64 10*6/MM3 (ref 3.77–5.28)
RH BLD: POSITIVE
WBC NRBC COR # BLD: 6.47 10*3/MM3 (ref 3.4–10.8)

## 2023-02-09 PROCEDURE — 84132 ASSAY OF SERUM POTASSIUM: CPT

## 2023-02-09 PROCEDURE — 85027 COMPLETE CBC AUTOMATED: CPT

## 2023-02-09 PROCEDURE — 86900 BLOOD TYPING SEROLOGIC ABO: CPT

## 2023-02-09 PROCEDURE — 86901 BLOOD TYPING SEROLOGIC RH(D): CPT

## 2023-02-09 PROCEDURE — 85610 PROTHROMBIN TIME: CPT | Performed by: SURGERY

## 2023-02-09 PROCEDURE — 83036 HEMOGLOBIN GLYCOSYLATED A1C: CPT

## 2023-02-09 PROCEDURE — 36415 COLL VENOUS BLD VENIPUNCTURE: CPT

## 2023-02-09 PROCEDURE — 87081 CULTURE SCREEN ONLY: CPT

## 2023-02-09 NOTE — PAT
An arrival time for procedure was not provided during PAT visit. If patient had any questions or concerns about their arrival time, they were instructed to contact their surgeon/physician.  Additionally, if the patient referred to an arrival time that was acquired from their my chart account, patient was encouraged to verify that time with their surgeon/physician. Arrival times are NOT provided in Pre Admission Testing Department.    Patient viewed general PAT education video as instructed in their preoperative information received from their surgeon.  Patient stated the general PAT education video was viewed in its entirety and survey completed.  Copies of PAT general education handouts (Incentive Spirometry, Meds to Beds Program, Patient Belongings, Pre-op skin preparation instructions, Blood Glucose testing, Visitor policy, Surgery FAQ, Code H) distributed to patient if not printed. Education related to the PAT pass and skin preparation for surgery (if applicable) completed in PAT as a reinforcement to PAT education video. Patient instructed to return PAT pass provided today as well as completed skin preparation sheet (if applicable) on the day of procedure.     Additionally if patient had not viewed video yet but intended to view it at home or in our waiting area, then referred them to the handout with QR code/link provided during PAT visit.  Instructed patient to complete survey after viewing the video in its entirety.  Encouraged patient/family to read PAT general education handouts thoroughly and notify PAT staff with any questions or concerns. Patient verbalized understanding of all information and priority content.    Patient to apply Chlorhexadine wipes  to surgical area (as instructed) the night before procedure and the AM of procedure. Wipes provided.    Too early to draw type and screen in PAT.  Please obtain blood bank specimen in pre-op on the day of surgery.    Patient denies any current skin issues.      Patient instructed to drink 20 ounces of Gatorade and it needs to be completed 1 hour (for Main OR patients) or 2 hours (scheduled  section & [facility] patients) before given arrival time for procedure (NO RED Gatorade)    Patient verbalized understanding.    EKG  from 2022 and cardiac clearance from 2022 in chart.

## 2023-02-10 LAB — MRSA SPEC QL CULT: NORMAL

## 2023-02-16 ENCOUNTER — ANESTHESIA (OUTPATIENT)
Dept: PERIOP | Facility: HOSPITAL | Age: 58
DRG: 621 | End: 2023-02-16
Payer: COMMERCIAL

## 2023-02-16 ENCOUNTER — HOSPITAL ENCOUNTER (INPATIENT)
Facility: HOSPITAL | Age: 58
LOS: 2 days | Discharge: HOME | DRG: 621 | End: 2023-02-18
Attending: SURGERY | Admitting: SURGERY
Payer: COMMERCIAL

## 2023-02-16 ENCOUNTER — ANESTHESIA EVENT (OUTPATIENT)
Dept: PERIOP | Facility: HOSPITAL | Age: 58
DRG: 621 | End: 2023-02-16
Payer: COMMERCIAL

## 2023-02-16 ENCOUNTER — ANESTHESIA EVENT CONVERTED (OUTPATIENT)
Dept: ANESTHESIOLOGY | Facility: HOSPITAL | Age: 58
DRG: 621 | End: 2023-02-16
Payer: COMMERCIAL

## 2023-02-16 DIAGNOSIS — E66.01 MORBID OBESITY WITH BODY MASS INDEX (BMI) OF 50.0 TO 59.9 IN ADULT: Primary | ICD-10-CM

## 2023-02-16 DIAGNOSIS — K81.1 CHRONIC CHOLECYSTITIS WITHOUT CALCULUS: ICD-10-CM

## 2023-02-16 LAB
ABO GROUP BLD: NORMAL
BLD GP AB SCN SERPL QL: NEGATIVE
GLUCOSE BLDC GLUCOMTR-MCNC: 78 MG/DL (ref 70–130)
INR PPP: 0.97 (ref 0.84–1.13)
PROTHROMBIN TIME: 12.8 SECONDS (ref 11.4–14.4)
RH BLD: POSITIVE
T&S EXPIRATION DATE: NORMAL

## 2023-02-16 PROCEDURE — 25010000002 FLUCONAZOLE PER 200 MG: Performed by: SURGERY

## 2023-02-16 PROCEDURE — 25010000002 PROPOFOL 10 MG/ML EMULSION: Performed by: NURSE ANESTHETIST, CERTIFIED REGISTERED

## 2023-02-16 PROCEDURE — 25010000002 ONDANSETRON PER 1 MG: Performed by: NURSE ANESTHETIST, CERTIFIED REGISTERED

## 2023-02-16 PROCEDURE — 0FB23ZX EXCISION OF LEFT LOBE LIVER, PERCUTANEOUS APPROACH, DIAGNOSTIC: ICD-10-PCS | Performed by: SURGERY

## 2023-02-16 PROCEDURE — 25010000002 CEFAZOLIN PER 500 MG: Performed by: SURGERY

## 2023-02-16 PROCEDURE — 47562 LAPAROSCOPIC CHOLECYSTECTOMY: CPT | Performed by: SURGERY

## 2023-02-16 PROCEDURE — 25010000002 ENOXAPARIN PER 10 MG: Performed by: SURGERY

## 2023-02-16 PROCEDURE — 85610 PROTHROMBIN TIME: CPT | Performed by: ANESTHESIOLOGY

## 2023-02-16 PROCEDURE — 47001 NDL BIOPSY LVR TM OTH MAJ PX: CPT | Performed by: SURGERY

## 2023-02-16 PROCEDURE — 86900 BLOOD TYPING SEROLOGIC ABO: CPT | Performed by: SURGERY

## 2023-02-16 PROCEDURE — 25010000002 BUPRENORPHINE PER 0.1 MG: Performed by: NURSE ANESTHETIST, CERTIFIED REGISTERED

## 2023-02-16 PROCEDURE — 0DB64Z3 EXCISION OF STOMACH, PERCUTANEOUS ENDOSCOPIC APPROACH, VERTICAL: ICD-10-PCS | Performed by: SURGERY

## 2023-02-16 PROCEDURE — 86850 RBC ANTIBODY SCREEN: CPT | Performed by: SURGERY

## 2023-02-16 PROCEDURE — 0FT44ZZ RESECTION OF GALLBLADDER, PERCUTANEOUS ENDOSCOPIC APPROACH: ICD-10-PCS | Performed by: SURGERY

## 2023-02-16 PROCEDURE — 88304 TISSUE EXAM BY PATHOLOGIST: CPT | Performed by: SURGERY

## 2023-02-16 PROCEDURE — 25010000002 DEXAMETHASONE PER 1 MG: Performed by: NURSE ANESTHETIST, CERTIFIED REGISTERED

## 2023-02-16 PROCEDURE — 25010000002 GLUCAGON (HUMAN RECOMBINANT) 1 MG RECONSTITUTED SOLUTION: Performed by: NURSE ANESTHETIST, CERTIFIED REGISTERED

## 2023-02-16 PROCEDURE — 43775 LAP SLEEVE GASTRECTOMY: CPT | Performed by: SURGERY

## 2023-02-16 PROCEDURE — 88313 SPECIAL STAINS GROUP 2: CPT | Performed by: SURGERY

## 2023-02-16 PROCEDURE — 25010000002 DEXAMETHASONE SODIUM PHOSPHATE 10 MG/ML SOLUTION: Performed by: NURSE ANESTHETIST, CERTIFIED REGISTERED

## 2023-02-16 PROCEDURE — 88307 TISSUE EXAM BY PATHOLOGIST: CPT | Performed by: SURGERY

## 2023-02-16 PROCEDURE — 86901 BLOOD TYPING SEROLOGIC RH(D): CPT | Performed by: SURGERY

## 2023-02-16 PROCEDURE — 25010000002 HYDRALAZINE PER 20 MG

## 2023-02-16 PROCEDURE — 82962 GLUCOSE BLOOD TEST: CPT

## 2023-02-16 DEVICE — FLOSEAL HEMOSTATIC MATRIX, 10ML
Type: IMPLANTABLE DEVICE | Site: ABDOMEN | Status: FUNCTIONAL
Brand: FLOSEAL HEMOSTATIC MATRIX

## 2023-02-16 DEVICE — IMPLANTABLE DEVICE
Type: IMPLANTABLE DEVICE | Site: STOMACH | Status: FUNCTIONAL
Brand: TITAN SGS STANDARD GASTRIC STAPLER

## 2023-02-16 DEVICE — LIGAMAX 5 MM ENDOSCOPIC MULTIPLE CLIP APPLIER
Type: IMPLANTABLE DEVICE | Site: BILE DUCT | Status: FUNCTIONAL
Brand: LIGAMAX

## 2023-02-16 RX ORDER — SODIUM CHLORIDE 0.9 % (FLUSH) 0.9 %
3 SYRINGE (ML) INJECTION EVERY 12 HOURS SCHEDULED
Status: DISCONTINUED | OUTPATIENT
Start: 2023-02-16 | End: 2023-02-16 | Stop reason: HOSPADM

## 2023-02-16 RX ORDER — BUPIVACAINE HYDROCHLORIDE 2.5 MG/ML
INJECTION, SOLUTION EPIDURAL; INFILTRATION; INTRACAUDAL
Status: COMPLETED | OUTPATIENT
Start: 2023-02-16 | End: 2023-02-16

## 2023-02-16 RX ORDER — IPRATROPIUM BROMIDE AND ALBUTEROL SULFATE 2.5; .5 MG/3ML; MG/3ML
3 SOLUTION RESPIRATORY (INHALATION) ONCE AS NEEDED
Status: DISCONTINUED | OUTPATIENT
Start: 2023-02-16 | End: 2023-02-16 | Stop reason: HOSPADM

## 2023-02-16 RX ORDER — SODIUM CHLORIDE 0.9 % (FLUSH) 0.9 %
10 SYRINGE (ML) INJECTION AS NEEDED
Status: CANCELLED | OUTPATIENT
Start: 2023-02-16

## 2023-02-16 RX ORDER — SODIUM CHLORIDE 9 MG/ML
40 INJECTION, SOLUTION INTRAVENOUS AS NEEDED
Status: DISCONTINUED | OUTPATIENT
Start: 2023-02-16 | End: 2023-02-16 | Stop reason: HOSPADM

## 2023-02-16 RX ORDER — PROPOFOL 10 MG/ML
VIAL (ML) INTRAVENOUS AS NEEDED
Status: DISCONTINUED | OUTPATIENT
Start: 2023-02-16 | End: 2023-02-16 | Stop reason: SURG

## 2023-02-16 RX ORDER — DEXMEDETOMIDINE HYDROCHLORIDE 100 UG/ML
INJECTION, SOLUTION INTRAVENOUS AS NEEDED
Status: DISCONTINUED | OUTPATIENT
Start: 2023-02-16 | End: 2023-02-16 | Stop reason: SURG

## 2023-02-16 RX ORDER — ONDANSETRON 4 MG/1
4 TABLET, FILM COATED ORAL EVERY 4 HOURS PRN
Status: DISCONTINUED | OUTPATIENT
Start: 2023-02-16 | End: 2023-02-18 | Stop reason: HOSPADM

## 2023-02-16 RX ORDER — DIPHENHYDRAMINE HYDROCHLORIDE 50 MG/ML
25 INJECTION INTRAMUSCULAR; INTRAVENOUS EVERY 4 HOURS PRN
Status: DISCONTINUED | OUTPATIENT
Start: 2023-02-16 | End: 2023-02-18 | Stop reason: HOSPADM

## 2023-02-16 RX ORDER — SODIUM CHLORIDE 0.9 % (FLUSH) 0.9 %
3-10 SYRINGE (ML) INJECTION AS NEEDED
Status: DISCONTINUED | OUTPATIENT
Start: 2023-02-16 | End: 2023-02-16 | Stop reason: HOSPADM

## 2023-02-16 RX ORDER — CHOLECALCIFEROL (VITAMIN D3) 125 MCG
1 CAPSULE ORAL DAILY
COMMUNITY

## 2023-02-16 RX ORDER — FENTANYL CITRATE 50 UG/ML
50 INJECTION, SOLUTION INTRAMUSCULAR; INTRAVENOUS
Status: DISCONTINUED | OUTPATIENT
Start: 2023-02-16 | End: 2023-02-16 | Stop reason: HOSPADM

## 2023-02-16 RX ORDER — CYANOCOBALAMIN 1000 UG/ML
1000 INJECTION, SOLUTION INTRAMUSCULAR; SUBCUTANEOUS ONCE
Status: COMPLETED | OUTPATIENT
Start: 2023-02-17 | End: 2023-02-17

## 2023-02-16 RX ORDER — LORAZEPAM 1 MG/1
1 TABLET ORAL EVERY 12 HOURS PRN
Status: DISCONTINUED | OUTPATIENT
Start: 2023-02-16 | End: 2023-02-18 | Stop reason: HOSPADM

## 2023-02-16 RX ORDER — PROMETHAZINE HYDROCHLORIDE 25 MG/1
25 SUPPOSITORY RECTAL ONCE AS NEEDED
Status: DISCONTINUED | OUTPATIENT
Start: 2023-02-16 | End: 2023-02-16 | Stop reason: HOSPADM

## 2023-02-16 RX ORDER — FIBRINOGEN HUMAN AND THROMBIN HUMAN 10 ML
KIT TOPICAL AS NEEDED
Status: DISCONTINUED | OUTPATIENT
Start: 2023-02-16 | End: 2023-02-16 | Stop reason: HOSPADM

## 2023-02-16 RX ORDER — OXYCODONE HYDROCHLORIDE 5 MG/1
5 TABLET ORAL EVERY 6 HOURS PRN
Status: DISCONTINUED | OUTPATIENT
Start: 2023-02-16 | End: 2023-02-18 | Stop reason: HOSPADM

## 2023-02-16 RX ORDER — ALBUTEROL SULFATE 2.5 MG/3ML
2.5 SOLUTION RESPIRATORY (INHALATION) EVERY 4 HOURS PRN
Status: DISCONTINUED | OUTPATIENT
Start: 2023-02-16 | End: 2023-02-18 | Stop reason: HOSPADM

## 2023-02-16 RX ORDER — SCOLOPAMINE TRANSDERMAL SYSTEM 1 MG/1
1 PATCH, EXTENDED RELEASE TRANSDERMAL ONCE
Status: DISCONTINUED | OUTPATIENT
Start: 2023-02-16 | End: 2023-02-16

## 2023-02-16 RX ORDER — ENOXAPARIN SODIUM 100 MG/ML
INJECTION SUBCUTANEOUS AS NEEDED
Status: DISCONTINUED | OUTPATIENT
Start: 2023-02-16 | End: 2023-02-16 | Stop reason: HOSPADM

## 2023-02-16 RX ORDER — CEFAZOLIN SODIUM IN 0.9 % NACL 3 G/100 ML
3 INTRAVENOUS SOLUTION, PIGGYBACK (ML) INTRAVENOUS ONCE
Status: COMPLETED | OUTPATIENT
Start: 2023-02-16 | End: 2023-02-16

## 2023-02-16 RX ORDER — ACETAMINOPHEN 500 MG
1000 TABLET ORAL ONCE
Status: COMPLETED | OUTPATIENT
Start: 2023-02-16 | End: 2023-02-16

## 2023-02-16 RX ORDER — ONDANSETRON 2 MG/ML
INJECTION INTRAMUSCULAR; INTRAVENOUS AS NEEDED
Status: DISCONTINUED | OUTPATIENT
Start: 2023-02-16 | End: 2023-02-16 | Stop reason: SURG

## 2023-02-16 RX ORDER — HYDROMORPHONE HYDROCHLORIDE 2 MG/1
2 TABLET ORAL EVERY 4 HOURS PRN
Status: DISCONTINUED | OUTPATIENT
Start: 2023-02-16 | End: 2023-02-18 | Stop reason: HOSPADM

## 2023-02-16 RX ORDER — CEFAZOLIN SODIUM IN 0.9 % NACL 3 G/100 ML
3 INTRAVENOUS SOLUTION, PIGGYBACK (ML) INTRAVENOUS EVERY 8 HOURS
Status: COMPLETED | OUTPATIENT
Start: 2023-02-16 | End: 2023-02-17

## 2023-02-16 RX ORDER — NALOXONE HCL 0.4 MG/ML
0.1 VIAL (ML) INJECTION
Status: DISCONTINUED | OUTPATIENT
Start: 2023-02-16 | End: 2023-02-18 | Stop reason: HOSPADM

## 2023-02-16 RX ORDER — PANTOPRAZOLE SODIUM 40 MG/10ML
40 INJECTION, POWDER, LYOPHILIZED, FOR SOLUTION INTRAVENOUS
Status: DISCONTINUED | OUTPATIENT
Start: 2023-02-17 | End: 2023-02-18 | Stop reason: HOSPADM

## 2023-02-16 RX ORDER — MORPHINE SULFATE 4 MG/ML
4 INJECTION, SOLUTION INTRAMUSCULAR; INTRAVENOUS
Status: DISCONTINUED | OUTPATIENT
Start: 2023-02-16 | End: 2023-02-18 | Stop reason: HOSPADM

## 2023-02-16 RX ORDER — SODIUM CHLORIDE, SODIUM LACTATE, POTASSIUM CHLORIDE, CALCIUM CHLORIDE 600; 310; 30; 20 MG/100ML; MG/100ML; MG/100ML; MG/100ML
150 INJECTION, SOLUTION INTRAVENOUS CONTINUOUS
Status: DISCONTINUED | OUTPATIENT
Start: 2023-02-16 | End: 2023-02-18 | Stop reason: HOSPADM

## 2023-02-16 RX ORDER — FLUCONAZOLE 2 MG/ML
400 INJECTION, SOLUTION INTRAVENOUS EVERY 24 HOURS
Status: DISCONTINUED | OUTPATIENT
Start: 2023-02-17 | End: 2023-02-18 | Stop reason: HOSPADM

## 2023-02-16 RX ORDER — ENOXAPARIN SODIUM 100 MG/ML
40 INJECTION SUBCUTANEOUS EVERY 12 HOURS
Status: DISCONTINUED | OUTPATIENT
Start: 2023-02-17 | End: 2023-02-18 | Stop reason: HOSPADM

## 2023-02-16 RX ORDER — DEXAMETHASONE SODIUM PHOSPHATE 10 MG/ML
INJECTION, SOLUTION INTRAMUSCULAR; INTRAVENOUS
Status: COMPLETED | OUTPATIENT
Start: 2023-02-16 | End: 2023-02-16

## 2023-02-16 RX ORDER — DROPERIDOL 2.5 MG/ML
0.62 INJECTION, SOLUTION INTRAMUSCULAR; INTRAVENOUS
Status: DISCONTINUED | OUTPATIENT
Start: 2023-02-16 | End: 2023-02-16 | Stop reason: HOSPADM

## 2023-02-16 RX ORDER — SODIUM CHLORIDE 9 MG/ML
150 INJECTION, SOLUTION INTRAVENOUS CONTINUOUS
Status: DISCONTINUED | OUTPATIENT
Start: 2023-02-16 | End: 2023-02-16

## 2023-02-16 RX ORDER — FAMOTIDINE 20 MG/1
20 TABLET, FILM COATED ORAL ONCE
Status: CANCELLED | OUTPATIENT
Start: 2023-02-16 | End: 2023-02-16

## 2023-02-16 RX ORDER — HYDRALAZINE HYDROCHLORIDE 20 MG/ML
10 INJECTION INTRAMUSCULAR; INTRAVENOUS
Status: DISCONTINUED | OUTPATIENT
Start: 2023-02-16 | End: 2023-02-18 | Stop reason: HOSPADM

## 2023-02-16 RX ORDER — ACETAMINOPHEN 160 MG/5ML
1000 SOLUTION ORAL EVERY 8 HOURS PRN
Status: ACTIVE | OUTPATIENT
Start: 2023-02-16 | End: 2023-02-18

## 2023-02-16 RX ORDER — ALPRAZOLAM 0.25 MG/1
0.25 TABLET ORAL ONCE AS NEEDED
Status: DISCONTINUED | OUTPATIENT
Start: 2023-02-16 | End: 2023-02-18 | Stop reason: HOSPADM

## 2023-02-16 RX ORDER — PROCHLORPERAZINE MALEATE 10 MG
10 TABLET ORAL EVERY 6 HOURS PRN
Status: DISCONTINUED | OUTPATIENT
Start: 2023-02-16 | End: 2023-02-18 | Stop reason: HOSPADM

## 2023-02-16 RX ORDER — ONDANSETRON 4 MG/1
4 TABLET, FILM COATED ORAL EVERY 6 HOURS PRN
Status: DISCONTINUED | OUTPATIENT
Start: 2023-02-20 | End: 2023-02-18 | Stop reason: HOSPADM

## 2023-02-16 RX ORDER — SIMETHICONE 80 MG
80 TABLET,CHEWABLE ORAL 4 TIMES DAILY PRN
Status: DISCONTINUED | OUTPATIENT
Start: 2023-02-16 | End: 2023-02-18 | Stop reason: HOSPADM

## 2023-02-16 RX ORDER — LABETALOL HYDROCHLORIDE 5 MG/ML
5 INJECTION, SOLUTION INTRAVENOUS
Status: DISCONTINUED | OUTPATIENT
Start: 2023-02-16 | End: 2023-02-16 | Stop reason: HOSPADM

## 2023-02-16 RX ORDER — ONDANSETRON 2 MG/ML
4 INJECTION INTRAMUSCULAR; INTRAVENOUS EVERY 4 HOURS PRN
Status: DISCONTINUED | OUTPATIENT
Start: 2023-02-16 | End: 2023-02-18 | Stop reason: HOSPADM

## 2023-02-16 RX ORDER — NALOXONE HCL 0.4 MG/ML
0.4 VIAL (ML) INJECTION
Status: DISCONTINUED | OUTPATIENT
Start: 2023-02-16 | End: 2023-02-18 | Stop reason: HOSPADM

## 2023-02-16 RX ORDER — DOXEPIN HYDROCHLORIDE 50 MG/1
100 CAPSULE ORAL NIGHTLY
Status: DISCONTINUED | OUTPATIENT
Start: 2023-02-16 | End: 2023-02-18 | Stop reason: HOSPADM

## 2023-02-16 RX ORDER — HYDRALAZINE HYDROCHLORIDE 20 MG/ML
5 INJECTION INTRAMUSCULAR; INTRAVENOUS
Status: DISCONTINUED | OUTPATIENT
Start: 2023-02-16 | End: 2023-02-16 | Stop reason: HOSPADM

## 2023-02-16 RX ORDER — PROMETHAZINE HYDROCHLORIDE 12.5 MG/1
12.5 TABLET ORAL EVERY 6 HOURS PRN
Status: DISCONTINUED | OUTPATIENT
Start: 2023-02-16 | End: 2023-02-18 | Stop reason: HOSPADM

## 2023-02-16 RX ORDER — GABAPENTIN 300 MG/1
300 CAPSULE ORAL 3 TIMES DAILY
Status: DISCONTINUED | OUTPATIENT
Start: 2023-02-16 | End: 2023-02-18 | Stop reason: HOSPADM

## 2023-02-16 RX ORDER — CHLORHEXIDINE GLUCONATE 0.12 MG/ML
30 RINSE ORAL
Status: COMPLETED | OUTPATIENT
Start: 2023-02-16 | End: 2023-02-16

## 2023-02-16 RX ORDER — LIDOCAINE HYDROCHLORIDE 10 MG/ML
0.5 INJECTION, SOLUTION EPIDURAL; INFILTRATION; INTRACAUDAL; PERINEURAL ONCE AS NEEDED
Status: COMPLETED | OUTPATIENT
Start: 2023-02-16 | End: 2023-02-16

## 2023-02-16 RX ORDER — SODIUM CHLORIDE, SODIUM LACTATE, POTASSIUM CHLORIDE, CALCIUM CHLORIDE 600; 310; 30; 20 MG/100ML; MG/100ML; MG/100ML; MG/100ML
INJECTION, SOLUTION INTRAVENOUS CONTINUOUS PRN
Status: DISCONTINUED | OUTPATIENT
Start: 2023-02-16 | End: 2023-02-16 | Stop reason: SURG

## 2023-02-16 RX ORDER — DEXAMETHASONE SODIUM PHOSPHATE 4 MG/ML
INJECTION, SOLUTION INTRA-ARTICULAR; INTRALESIONAL; INTRAMUSCULAR; INTRAVENOUS; SOFT TISSUE AS NEEDED
Status: DISCONTINUED | OUTPATIENT
Start: 2023-02-16 | End: 2023-02-16 | Stop reason: SURG

## 2023-02-16 RX ORDER — HYDROXYZINE PAMOATE 50 MG/1
50 CAPSULE ORAL DAILY
Status: DISCONTINUED | OUTPATIENT
Start: 2023-02-16 | End: 2023-02-18 | Stop reason: HOSPADM

## 2023-02-16 RX ORDER — FAMOTIDINE 10 MG/ML
20 INJECTION, SOLUTION INTRAVENOUS ONCE
Status: CANCELLED | OUTPATIENT
Start: 2023-02-16 | End: 2023-02-16

## 2023-02-16 RX ORDER — LIDOCAINE HYDROCHLORIDE 10 MG/ML
INJECTION, SOLUTION EPIDURAL; INFILTRATION; INTRACAUDAL; PERINEURAL AS NEEDED
Status: DISCONTINUED | OUTPATIENT
Start: 2023-02-16 | End: 2023-02-16 | Stop reason: SURG

## 2023-02-16 RX ORDER — SODIUM CHLORIDE, SODIUM LACTATE, POTASSIUM CHLORIDE, CALCIUM CHLORIDE 600; 310; 30; 20 MG/100ML; MG/100ML; MG/100ML; MG/100ML
9 INJECTION, SOLUTION INTRAVENOUS CONTINUOUS
Status: CANCELLED | OUTPATIENT
Start: 2023-02-16

## 2023-02-16 RX ORDER — DROPERIDOL 2.5 MG/ML
0.62 INJECTION, SOLUTION INTRAMUSCULAR; INTRAVENOUS ONCE AS NEEDED
Status: DISCONTINUED | OUTPATIENT
Start: 2023-02-16 | End: 2023-02-16 | Stop reason: HOSPADM

## 2023-02-16 RX ORDER — POTASSIUM CHLORIDE 750 MG/1
20 CAPSULE, EXTENDED RELEASE ORAL DAILY
Status: DISCONTINUED | OUTPATIENT
Start: 2023-02-17 | End: 2023-02-18 | Stop reason: HOSPADM

## 2023-02-16 RX ORDER — HYDROCODONE BITARTRATE AND ACETAMINOPHEN 5; 325 MG/1; MG/1
1 TABLET ORAL ONCE AS NEEDED
Status: DISCONTINUED | OUTPATIENT
Start: 2023-02-16 | End: 2023-02-16 | Stop reason: HOSPADM

## 2023-02-16 RX ORDER — SODIUM CHLORIDE 9 MG/ML
INJECTION, SOLUTION INTRAVENOUS AS NEEDED
Status: DISCONTINUED | OUTPATIENT
Start: 2023-02-16 | End: 2023-02-16 | Stop reason: HOSPADM

## 2023-02-16 RX ORDER — ONDANSETRON 2 MG/ML
4 INJECTION INTRAMUSCULAR; INTRAVENOUS ONCE AS NEEDED
Status: DISCONTINUED | OUTPATIENT
Start: 2023-02-16 | End: 2023-02-16 | Stop reason: HOSPADM

## 2023-02-16 RX ORDER — MIDAZOLAM HYDROCHLORIDE 1 MG/ML
1 INJECTION INTRAMUSCULAR; INTRAVENOUS
Status: DISCONTINUED | OUTPATIENT
Start: 2023-02-16 | End: 2023-02-16 | Stop reason: HOSPADM

## 2023-02-16 RX ORDER — ACETAMINOPHEN 500 MG
1000 TABLET ORAL EVERY 8 HOURS PRN
Status: DISPENSED | OUTPATIENT
Start: 2023-02-16 | End: 2023-02-18

## 2023-02-16 RX ORDER — PROMETHAZINE HYDROCHLORIDE 25 MG/1
25 TABLET ORAL ONCE AS NEEDED
Status: DISCONTINUED | OUTPATIENT
Start: 2023-02-16 | End: 2023-02-16 | Stop reason: HOSPADM

## 2023-02-16 RX ORDER — BISOPROLOL FUMARATE 10 MG/1
10 TABLET, FILM COATED ORAL EVERY 12 HOURS SCHEDULED
Status: DISCONTINUED | OUTPATIENT
Start: 2023-02-16 | End: 2023-02-18 | Stop reason: HOSPADM

## 2023-02-16 RX ORDER — ALPRAZOLAM 0.5 MG/1
0.5 TABLET ORAL 3 TIMES DAILY
Status: DISCONTINUED | OUTPATIENT
Start: 2023-02-16 | End: 2023-02-18 | Stop reason: HOSPADM

## 2023-02-16 RX ORDER — BUPRENORPHINE HYDROCHLORIDE 0.32 MG/ML
INJECTION INTRAMUSCULAR; INTRAVENOUS
Status: COMPLETED | OUTPATIENT
Start: 2023-02-16 | End: 2023-02-16

## 2023-02-16 RX ORDER — BUPIVACAINE HYDROCHLORIDE AND EPINEPHRINE 5; 5 MG/ML; UG/ML
INJECTION, SOLUTION PERINEURAL AS NEEDED
Status: DISCONTINUED | OUTPATIENT
Start: 2023-02-16 | End: 2023-02-16 | Stop reason: HOSPADM

## 2023-02-16 RX ORDER — HYDRALAZINE HYDROCHLORIDE 20 MG/ML
INJECTION INTRAMUSCULAR; INTRAVENOUS
Status: COMPLETED
Start: 2023-02-16 | End: 2023-02-16

## 2023-02-16 RX ORDER — MAGNESIUM HYDROXIDE 1200 MG/15ML
LIQUID ORAL AS NEEDED
Status: DISCONTINUED | OUTPATIENT
Start: 2023-02-16 | End: 2023-02-16 | Stop reason: HOSPADM

## 2023-02-16 RX ORDER — SODIUM CHLORIDE 9 MG/ML
40 INJECTION, SOLUTION INTRAVENOUS AS NEEDED
Status: CANCELLED | OUTPATIENT
Start: 2023-02-16

## 2023-02-16 RX ORDER — PANTOPRAZOLE SODIUM 40 MG/10ML
40 INJECTION, POWDER, LYOPHILIZED, FOR SOLUTION INTRAVENOUS ONCE
Status: COMPLETED | OUTPATIENT
Start: 2023-02-16 | End: 2023-02-16

## 2023-02-16 RX ORDER — ROCURONIUM BROMIDE 10 MG/ML
INJECTION, SOLUTION INTRAVENOUS AS NEEDED
Status: DISCONTINUED | OUTPATIENT
Start: 2023-02-16 | End: 2023-02-16 | Stop reason: SURG

## 2023-02-16 RX ORDER — METOCLOPRAMIDE HYDROCHLORIDE 5 MG/ML
10 INJECTION INTRAMUSCULAR; INTRAVENOUS EVERY 6 HOURS PRN
Status: DISCONTINUED | OUTPATIENT
Start: 2023-02-16 | End: 2023-02-18 | Stop reason: HOSPADM

## 2023-02-16 RX ORDER — SUCCINYLCHOLINE/SOD CL,ISO/PF 200MG/10ML
SYRINGE (ML) INTRAVENOUS AS NEEDED
Status: DISCONTINUED | OUTPATIENT
Start: 2023-02-16 | End: 2023-02-16 | Stop reason: SURG

## 2023-02-16 RX ORDER — LORAZEPAM 2 MG/ML
0.5 INJECTION INTRAMUSCULAR EVERY 12 HOURS PRN
Status: DISCONTINUED | OUTPATIENT
Start: 2023-02-16 | End: 2023-02-18 | Stop reason: HOSPADM

## 2023-02-16 RX ORDER — SODIUM CHLORIDE AND POTASSIUM CHLORIDE 150; 450 MG/100ML; MG/100ML
125 INJECTION, SOLUTION INTRAVENOUS CONTINUOUS
Status: DISCONTINUED | OUTPATIENT
Start: 2023-02-17 | End: 2023-02-18 | Stop reason: HOSPADM

## 2023-02-16 RX ORDER — NALOXONE HCL 0.4 MG/ML
0.4 VIAL (ML) INJECTION AS NEEDED
Status: DISCONTINUED | OUTPATIENT
Start: 2023-02-16 | End: 2023-02-16 | Stop reason: HOSPADM

## 2023-02-16 RX ORDER — SODIUM CHLORIDE 0.9 % (FLUSH) 0.9 %
10 SYRINGE (ML) INJECTION EVERY 12 HOURS SCHEDULED
Status: CANCELLED | OUTPATIENT
Start: 2023-02-16

## 2023-02-16 RX ORDER — FLUCONAZOLE 2 MG/ML
400 INJECTION, SOLUTION INTRAVENOUS EVERY 24 HOURS
Status: COMPLETED | OUTPATIENT
Start: 2023-02-16 | End: 2023-02-16

## 2023-02-16 RX ORDER — MEPERIDINE HYDROCHLORIDE 25 MG/ML
12.5 INJECTION INTRAMUSCULAR; INTRAVENOUS; SUBCUTANEOUS
Status: DISCONTINUED | OUTPATIENT
Start: 2023-02-16 | End: 2023-02-16 | Stop reason: HOSPADM

## 2023-02-16 RX ORDER — GABAPENTIN 300 MG/1
600 CAPSULE ORAL ONCE
Status: COMPLETED | OUTPATIENT
Start: 2023-02-16 | End: 2023-02-16

## 2023-02-16 RX ADMIN — ROCURONIUM BROMIDE 10 MG: 10 INJECTION, SOLUTION INTRAVENOUS at 09:23

## 2023-02-16 RX ADMIN — ALPRAZOLAM 0.5 MG: 0.5 TABLET ORAL at 21:20

## 2023-02-16 RX ADMIN — FLUCONAZOLE 400 MG: 400 INJECTION, SOLUTION INTRAVENOUS at 09:26

## 2023-02-16 RX ADMIN — CEFAZOLIN 3 G: 10 INJECTION, POWDER, FOR SOLUTION INTRAVENOUS at 18:03

## 2023-02-16 RX ADMIN — BISOPROLOL FUMARATE 10 MG: 10 TABLET ORAL at 16:08

## 2023-02-16 RX ADMIN — LIDOCAINE HYDROCHLORIDE 0.5 ML: 10 INJECTION, SOLUTION EPIDURAL; INFILTRATION; INTRACAUDAL; PERINEURAL at 08:20

## 2023-02-16 RX ADMIN — SCOPALAMINE 1 PATCH: 1 PATCH, EXTENDED RELEASE TRANSDERMAL at 08:22

## 2023-02-16 RX ADMIN — DEXAMETHASONE SODIUM PHOSPHATE 8 MG: 4 INJECTION, SOLUTION INTRAMUSCULAR; INTRAVENOUS at 09:32

## 2023-02-16 RX ADMIN — BUPIVACAINE HYDROCHLORIDE 60 ML: 2.5 INJECTION, SOLUTION EPIDURAL; INFILTRATION; INTRACAUDAL; PERINEURAL at 09:25

## 2023-02-16 RX ADMIN — ROCURONIUM BROMIDE 20 MG: 10 INJECTION, SOLUTION INTRAVENOUS at 09:59

## 2023-02-16 RX ADMIN — SODIUM CHLORIDE, POTASSIUM CHLORIDE, SODIUM LACTATE AND CALCIUM CHLORIDE: 600; 310; 30; 20 INJECTION, SOLUTION INTRAVENOUS at 09:23

## 2023-02-16 RX ADMIN — PANTOPRAZOLE SODIUM 40 MG: 40 INJECTION, POWDER, FOR SOLUTION INTRAVENOUS at 08:19

## 2023-02-16 RX ADMIN — DEXMEDETOMIDINE HYDROCHLORIDE 8 MCG: 100 INJECTION, SOLUTION INTRAVENOUS at 10:47

## 2023-02-16 RX ADMIN — HYDRALAZINE HYDROCHLORIDE 5 MG: 20 INJECTION INTRAMUSCULAR; INTRAVENOUS at 12:04

## 2023-02-16 RX ADMIN — HYDROMORPHONE HYDROCHLORIDE 2 MG: 2 TABLET ORAL at 20:11

## 2023-02-16 RX ADMIN — SODIUM CHLORIDE 1000 ML: 9 INJECTION, SOLUTION INTRAVENOUS at 08:20

## 2023-02-16 RX ADMIN — ROCURONIUM BROMIDE 70 MG: 10 INJECTION, SOLUTION INTRAVENOUS at 09:37

## 2023-02-16 RX ADMIN — SODIUM CHLORIDE, POTASSIUM CHLORIDE, SODIUM LACTATE AND CALCIUM CHLORIDE 150 ML/HR: 600; 310; 30; 20 INJECTION, SOLUTION INTRAVENOUS at 21:20

## 2023-02-16 RX ADMIN — PROPOFOL 200 MG: 10 INJECTION, EMULSION INTRAVENOUS at 09:23

## 2023-02-16 RX ADMIN — Medication 200 MG: at 09:23

## 2023-02-16 RX ADMIN — CHLORHEXIDINE GLUCONATE 0.12% ORAL RINSE 15 ML: 1.2 LIQUID ORAL at 08:34

## 2023-02-16 RX ADMIN — Medication 10 ML: at 08:20

## 2023-02-16 RX ADMIN — GABAPENTIN 300 MG: 300 CAPSULE ORAL at 21:20

## 2023-02-16 RX ADMIN — BUPRENORPHINE HYDROCHLORIDE 0.3 MG: 0.32 INJECTION INTRAMUSCULAR; INTRAVENOUS at 09:25

## 2023-02-16 RX ADMIN — ACETAMINOPHEN 1000 MG: 500 TABLET ORAL at 08:22

## 2023-02-16 RX ADMIN — SODIUM CHLORIDE, POTASSIUM CHLORIDE, SODIUM LACTATE AND CALCIUM CHLORIDE: 600; 310; 30; 20 INJECTION, SOLUTION INTRAVENOUS at 10:31

## 2023-02-16 RX ADMIN — DOXEPIN HYDROCHLORIDE 100 MG: 50 CAPSULE ORAL at 21:20

## 2023-02-16 RX ADMIN — GABAPENTIN 600 MG: 300 CAPSULE ORAL at 08:21

## 2023-02-16 RX ADMIN — HYDROXYZINE PAMOATE 50 MG: 50 CAPSULE ORAL at 21:23

## 2023-02-16 RX ADMIN — LIDOCAINE HYDROCHLORIDE 50 MG: 10 INJECTION, SOLUTION EPIDURAL; INFILTRATION; INTRACAUDAL; PERINEURAL at 09:23

## 2023-02-16 RX ADMIN — CHLORHEXIDINE GLUCONATE 0.12% ORAL RINSE 15 ML: 1.2 LIQUID ORAL at 08:19

## 2023-02-16 RX ADMIN — DEXMEDETOMIDINE HYDROCHLORIDE 8 MCG: 100 INJECTION, SOLUTION INTRAVENOUS at 09:18

## 2023-02-16 RX ADMIN — SUGAMMADEX 500 MG: 100 INJECTION, SOLUTION INTRAVENOUS at 10:50

## 2023-02-16 RX ADMIN — DEXAMETHASONE SODIUM PHOSPHATE 4 MG: 10 INJECTION, SOLUTION INTRAMUSCULAR; INTRAVENOUS at 09:25

## 2023-02-16 RX ADMIN — CEFAZOLIN 3 G: 10 INJECTION, POWDER, FOR SOLUTION INTRAVENOUS at 09:28

## 2023-02-16 RX ADMIN — PROPOFOL 25 MCG/KG/MIN: 10 INJECTION, EMULSION INTRAVENOUS at 09:40

## 2023-02-16 RX ADMIN — ONDANSETRON 4 MG: 2 INJECTION INTRAMUSCULAR; INTRAVENOUS at 10:29

## 2023-02-16 RX ADMIN — GLUCAGON HYDROCHLORIDE 1 MG: KIT at 09:51

## 2023-02-16 RX ADMIN — DEXMEDETOMIDINE HYDROCHLORIDE 8 MCG: 100 INJECTION, SOLUTION INTRAVENOUS at 10:45

## 2023-02-16 NOTE — ANESTHESIA PROCEDURE NOTES
Airway  Urgency: elective    Date/Time: 2/16/2023 9:24 AM  Airway not difficult    General Information and Staff    Patient location during procedure: OR  CRNA/CAA: Barb Riley CRNA    Indications and Patient Condition  Indications for airway management: airway protection    Preoxygenated: yes  MILS not maintained throughout  Mask difficulty assessment: 1 - vent by mask    Final Airway Details  Final airway type: endotracheal airway      Successful airway: ETT  Cuffed: yes   Successful intubation technique: video laryngoscopy and RSI  Endotracheal tube insertion site: oral  Blade: Dozier  Blade size: 4  ETT size (mm): 7.5  Cormack-Lehane Classification: grade I - full view of glottis  Placement verified by: chest auscultation and capnometry   Cuff volume (mL): 6  Measured from: lips  ETT/EBT  to lips (cm): 21  Number of attempts at approach: 1  Assessment: lips, teeth, and gum same as pre-op and atraumatic intubation    Additional Comments  Negative epigastric sounds, Breath sound equal bilaterally with symmetric chest rise and fall

## 2023-02-16 NOTE — ANESTHESIA PREPROCEDURE EVALUATION
Anesthesia Evaluation     Patient summary reviewed and Nursing notes reviewed   history of anesthetic complications: PONV  NPO Solid Status: > 8 hours  NPO Liquid Status: > 2 hours           Airway   Mallampati: II  TM distance: >3 FB  Neck ROM: full  No difficulty expected  Dental - normal exam     Pulmonary - negative pulmonary ROS and normal exam    breath sounds clear to auscultation  Cardiovascular - normal exam    ECG reviewed  Rhythm: regular  Rate: normal    (+) hypertension, DVT (Off xarelto x 7 days),       Neuro/Psych- negative ROS  GI/Hepatic/Renal/Endo    (+) morbid obesity,  liver disease fatty liver disease, renal disease CRI, diabetes mellitus type 2,     Musculoskeletal     Abdominal    Substance History      OB/GYN          Other   arthritis,                      Anesthesia Plan    ASA 3     general with block     (Bilateral TAP blocks post-induction for post-operative analgesia per request of Dr. Vee)  intravenous induction     Anesthetic plan, risks, benefits, and alternatives have been provided, discussed and informed consent has been obtained with: patient.    Plan discussed with CRNA.        CODE STATUS:

## 2023-02-16 NOTE — ANESTHESIA POSTPROCEDURE EVALUATION
Patient: Keyla Davila    Procedure Summary     Date: 02/16/23 Room / Location:  [facility] / [facility]    Anesthesia Start: 0916 Anesthesia Stop: 1120    Procedures:       GASTRIC SLEEVE LAPAROSCOPIC (Abdomen)      ESOPHAGOGASTRODUODENOSCOPY (Esophagus)      LIVER BIOPSY LAPAROSCOPIC (Abdomen)      CHOLECYSTECTOMY LAPAROSCOPIC (Abdomen) Diagnosis:       Body mass index (BMI) of 50-59.9 in adult (HCC)      Chronic cholecystitis without calculus      Fatty liver disease, nonalcoholic      (Body mass index (BMI) of 50-59.9 in adult (HCC) [Z68.43])      (Chronic cholecystitis without calculus [K81.1])    Surgeons: Bhupendra Vee MD Provider: Geovanni Ibarra MD    Anesthesia Type: general with block ASA Status: 3          Anesthesia Type: general with block    Vitals  Vitals Value Taken Time   /85 02/16/23 1117   Temp     Pulse 70 02/16/23 1120   Resp     SpO2 100 % 02/16/23 1120   Vitals shown include unvalidated device data.        Post Anesthesia Care and Evaluation    Patient location during evaluation: PACU  Patient participation: complete - patient participated  Level of consciousness: awake and alert  Pain score: 0  Pain management: adequate    Airway patency: patent  Anesthetic complications: No anesthetic complications  PONV Status: none  Cardiovascular status: hemodynamically stable and acceptable  Respiratory status: nonlabored ventilation, acceptable and nasal cannula  Hydration status: acceptable

## 2023-02-16 NOTE — PLAN OF CARE
Goal Outcome Evaluation:  Plan of Care Reviewed With: patient        Progress: no change  Outcome Evaluation: PT arrived to floor drowsy. on 8 L High flow nasal canula provided by RT. Snoring during sleep. VSS. Unable to walk at this time due to drowsiness. Will educate on goals of care when able to stay awake and alert.

## 2023-02-16 NOTE — ANESTHESIA PROCEDURE NOTES
Peripheral Block      Patient reassessed immediately prior to procedure    Patient location during procedure: OR  Start time: 2/16/2023 9:22 AM  Stop time: 2/16/2023 9:27 AM  Reason for block: at surgeon's request and post-op pain management  Performed by  Anesthesiologist: Geovanni Ibarra MD  Preanesthetic Checklist  Completed: patient identified, IV checked, site marked, risks and benefits discussed, surgical consent, monitors and equipment checked, pre-op evaluation and timeout performed  Prep:  Pt Position: supine  Sterile barriers:cap, gloves, mask and washed/disinfected hands  Prep: ChloraPrep  Patient monitoring: blood pressure monitoring, continuous pulse oximetry and EKG  Procedure    Sedation: yes  Performed under: general  Guidance:ultrasound guided and landmark technique  Images:still images obtained, printed/placed on chart    Laterality:Bilateral  Block Type:TAP  Injection Technique:single-shot  Needle Type:short-bevel  Needle Gauge:20 G  Resistance on Injection: none    Medications Used: bupivacaine PF (MARCAINE) 0.25 % injection - Injection   60 mL - 2/16/2023 9:25:00 AM  dexamethasone sodium phosphate injection - Injection   4 mg - 2/16/2023 9:25:00 AM  buprenorphine (BUPRENEX) injection - Injection   0.3 mg - 2/16/2023 9:25:00 AM      Medications  Preservative Free Saline:10ml  Comment:Block Injection:  Total volume of LA divided between Right and Left sided blocks         Post Assessment  Injection Assessment: negative aspiration for heme, incremental injection and no paresthesia on injection  Patient Tolerance:comfortable throughout block  Complications:no

## 2023-02-16 NOTE — OP NOTE
Preoperative Diagnosis:   Super morbid Obesity (134 kg, BMI 51.44)    with Multiple Co-Morbidities             Chronic cholecystitis without cholelithiasis            Fatty liver    Postoperative Diagnosis:   Same    Procedure:                                                      Laparoscopic Sleeve Gastrectomy (85% subtotal vertical gastrectomy, Titan ([id number])                                    Laparoscopic cholecystectomy                                                                        EGD                                        Laparoscopic Tino-Cut liver biopsies                                                             Surgeon:                                                       LISA Vee MD    Anesthesia:                                                   GETA    EBL:                                                              Minimal    Fluids:                                                           Crystalloid    Specimens:                                                   Subtotal gastrectomy, Tino-Cut liver biopsies    Drains:                                                           #10 MICHELE    Counts:                                                          Correct    Complications:                                               None    Indications:   This is a 57 year-old super morbidly obese female who presents for elective laparoscopic sleeve gastrectomy, cholecystectomy, EGD, and possible liver biopsy for presumed VALLEJO.  Repeat PT/INR this morning normal at 12.8 and 0.97.  She confirms she has held her Xarelto since the ninth or 10th.  She's undergone our extensive preoperative education teaching and consent process everything's in order and she wishes to proceed.      Operative technique:     The patient was brought to the operating room, and placed supine upon the operating room table.  SCD hose were placed, she underwent uneventful general endotracheal anesthesia per the  anesthesiology staff, she received IV Ancef and IV Diflucan, and subcutaneous Lovenox (see nursing notes), the anesthesiology staff performed a tap block, and her abdomen was prepped and draped with ChloraPrep in a sterile fashion, an Ioban was used as well, a Pardo catheter was not placed.    The peritoneal cavity was entered in the left upper quadrant using an 11 mm fascial splitting trocar utilizing an Optiview technique and the abdomen was insufflated to a pressure of 15 mmHg with CO2 gas.  Exploratory laparoscopy revealed no evidence of injury from the entrance technique, a very enlarged smooth nutmeg fatty liver no visible nodularity.  Also noted a moderate to large rectus diastases with a large semicircular weakness at the umbilicus.  Gallbladder fundus unremarkable.    Remaining trocars were placed under direct visualization including  5 mm trocars in the right, mid and left lateral abdomen, and after infiltration of the peritoneum under direct visualization with local anesthetic a 19 mm trocar was placed several centimeters above the umbilicus to the right of midline away from the rectus diastases.    Through a stab incision in the epigastrium somewhat lower than usual to compensate for her hepatomegaly, a Lesa retractor was used to elevate portions of the left lobe of the liver.  Some adhesions of the inferior lateral undersurface of the left lobe of the liver to the lesser omentum were divided with the Enseal device exposing the hiatus.  There was no visible hiatal hernia from the anterior view and this was photodocumented.   Beginning approximately two thirds of the way around the greater curvature the stomach, the gastrocolic vessels were divided proceeding proximally taking down all the short gastric vessels and exposing the left glen. There were no posterior hernias or lipomas and this was photodocumented.  No varices or ascites appreciated.  Some splenic cysts noted along the edge of the middle  portion of the spleen.  1 mg glucagon IV given.  Gastrocolic vessels were then divided medially to a few cm proximal to the pylorus.  Adhesions of the posterior stomach to the pancreas and retroperitoneum were divided.  The stomach was marked with a Kitner saturated with a marking pen 1 cm lateral to the angle of His, 3 cm away from the angularis, and 6 cm from the pylorus.  A 38 [place] citizen balloon bougie was advanced into the distal antrum and the balloon insufflated with 15 cc of saline.    The Titan stapler was positioned along the markings with the calibration balloon at the marking at the angularis and the stapler was closed.  The calibration bougie was desufflated and removed.  The 85% subtotal vertical sleeve gastrectomy was then performed with a single firing using the Titan stapler (713CB).   The Titan stapler was removed.  The subtotal gastrectomy specimen was retrieved through the 19 mm trocar site incision, inspected, and sent unopened to pathology for permanent section.  It was an average size specimen.      Attention was directed toward cholecystectomy.  The Lesa was removed and the left lateral 5 mm trocar moved up to this incision.  The gallbladder was visualized, it appeared normal with no adhesions to surrounding structures.  The gallbladder was elevated as high as possible, it could not be elevated over the liver given the hepatomegaly.  The neck of the gallbladder was retracted laterally.  Blunt dissection was used to circumferentially dissect the neck of the gallbladder and the cystic duct and artery. The cystic duct was of small caliber and had no palpable stones.  Dissection in the triangle of Calot revealed the csytic artery entering onto the surface of the gallbladder.  The cystic duct and artery were divided individually between two 5 mm clips on the patient side, one on the gallbladder side.  And retraction of the neck of the gallbladder there was a cholecystotomy made which led to  spillage of clear bile, no stones or sludge appreciated.  Peritoneal attachments of the gallbladder to the liver bed were divided, prior to dividing final attachments good hemostasis the gallbladder bed fossa was noted.  The gallbladder was placed in to an Endopouch and withdrawn through the 19 mm trocar site incision. I palpated the gallbladder through the bag, no masses or stones appreciated, and it was sent otherwise unopened to pathology for permanent section.  The right upper quadrant was copiously irrigated and suctioned until effluent was clear.  The gallbladder bed fossa was treated with Floseal and a Ray-Brice placed.    The epigastric trocar was returned to the left lateral abdominal position and the Lesa retractor replaced.   The sleeve was submerged under saline.  Upper endoscopy was performed, and the endoscope was advanced into the duodenal bulb.  No air bubbles or leak seen, no bleeding at the staple line, no narrowing at the angularis, no pyloric spasm or deformity.  There was diffuse gastritis noted with edema and erythema, no obvious features suggestive of H. pylori for possible portal gastropathy.  No hiatal hernia or Dsouza's esophagus, Z-line roughly 39 cm, and the endoscope was withdrawn.   Irrigation fluid was suctioned free.  The sleeve was resting nicely and hemostatic.  The sleeve staple line and gallbladder bed fossa were treated with a total of 10 cc of aerosolized Tisseel fibrin glue.  Photodocumentation of the sleeve obtained before and after endoscopy.  The Lesa retractor was removed.  Through the Lesa retractor incision a true cut biopsy was obtained with an automatic 18 gauge true cut needle from the anterior surface of the left lobe of the liver x 3 - hemostasis achieved with cautery and specimens sent to pathology.  A #10 flat fully perforated MICHELE drain was placed under the left lobe of the liver and up over the spleen and brought out through the right sided 5 mm  trocar site incision and anchored to the skin with a 2-0 nylon suture. 10 mg Barhemsys IV given.  Fascia at the 19 mm trocar site incision was closed with a horizontal mattress 0 Vicryl suture placed with a suture passer under direct visualization and tying the knot extracorporeally.  Remaining trocars were removed under direct visualization, no bleeding noted from their sites.  Subcutaneous tissue in the 19 and 11 mm trocar site incisions which were gaped open presumably due to her pannus were closed with interrupted figure-of-eight 2-0 Vicryl plus sutures taking off the skin tension.  Skin in each incision was closed using 3-0 Monocryl plus in an interrupted subcuticular stitch followed by skin glue.  A dry sterile dressing was placed around the MICHELE site.  The patient tolerated the procedure well without complication, was taken to the recovery room in stable condition.

## 2023-02-16 NOTE — BRIEF OP NOTE
GASTRIC SLEEVE LAPAROSCOPIC, ESOPHAGOGASTRODUODENOSCOPY, LIVER BIOPSY LAPAROSCOPIC  Progress Note    Keyla Davila  2/16/2023    Pre-op Diagnosis:   Body mass index (BMI) of 50-59.9 in adult (HCC) [Z68.43]  Chronic cholecystitis without calculus [K81.1]       Post-Op Diagnosis Codes:     * Body mass index (BMI) of 50-59.9 in adult (HCC) [Z68.43]     * Chronic cholecystitis without calculus [K81.1]     * Fatty liver disease, nonalcoholic [K76.0]    Procedure/CPT® Codes:  MT LAPS GSTRC RSTRICTIV PX LONGITUDINAL GASTRECTOMY [77379]  MT LAP,CHOLECYSTECTOMY [45886]  MT ESOPHAGOGASTRODUODENOSCOPY TRANSORAL DIAGNOSTIC [14065]  MT BX LVR NDL DONE PURPOSE TM OTH MAJOR PX [78427]      Procedure(s):  GASTRIC SLEEVE LAPAROSCOPIC  CHOLECYSTECTOMY LAPAROSCOPIC  ESOPHAGOGASTRODUODENOSCOPY  LIVER BIOPSY LAPAROSCOPIC        Surgeon(s):  Bhupendra Vee MD Weiss, George Derek, MD    Anesthesia: General with Block    Staff:   Circulator: Avani Kincaid RN; Naun Acosta RN  Scrub Person: Nolvia Velez  Nursing Assistant: Cadence Patel CNA         Estimated Blood Loss: minimal    Urine Voided: * No values recorded between 2/16/2023  9:18 AM and 2/16/2023 10:50 AM *    Specimens:                Specimens     ID Source Type Tests Collected By Collected At Frozen?    A Gallbladder Tissue · TISSUE PATHOLOGY EXAM   Bhupendra Vee MD 2/16/23 0953     B Stomach Tissue · TISSUE PATHOLOGY EXAM   Bhupendra Vee MD 2/16/23 0953 No    Description: SUB-TOTAL GASTRECTOMY    C Liver Tissue · TISSUE PATHOLOGY EXAM   Bhupendra Vee MD 2/16/23 4608     Description: liver biopsy for permanent                 Drains:   Closed/Suction Drain 1 Lateral RLQ (Active)       Findings:         Complications: none          Bhupendra Vee MD     Date: 2/16/2023  Time: 11:00 EST

## 2023-02-17 ENCOUNTER — APPOINTMENT (OUTPATIENT)
Dept: GENERAL RADIOLOGY | Facility: HOSPITAL | Age: 58
DRG: 621 | End: 2023-02-17
Payer: COMMERCIAL

## 2023-02-17 LAB
ALBUMIN SERPL-MCNC: 4.2 G/DL (ref 3.5–5.2)
ALBUMIN/GLOB SERPL: 2 G/DL
ALP SERPL-CCNC: 76 U/L (ref 39–117)
ALT SERPL W P-5'-P-CCNC: 128 U/L (ref 1–33)
ANION GAP SERPL CALCULATED.3IONS-SCNC: 12 MMOL/L (ref 5–15)
ARTERIAL PATENCY WRIST A: ABNORMAL
AST SERPL-CCNC: 124 U/L (ref 1–32)
ATMOSPHERIC PRESS: ABNORMAL MM[HG]
BASE EXCESS BLDA CALC-SCNC: -2.3 MMOL/L (ref 0–2)
BASOPHILS # BLD AUTO: 0 10*3/MM3 (ref 0–0.2)
BASOPHILS NFR BLD AUTO: 0 % (ref 0–1.5)
BDY SITE: ABNORMAL
BILIRUB SERPL-MCNC: 0.4 MG/DL (ref 0–1.2)
BODY TEMPERATURE: 37 C
BUN SERPL-MCNC: 16 MG/DL (ref 6–20)
BUN/CREAT SERPL: 12.4 (ref 7–25)
CALCIUM SPEC-SCNC: 8.7 MG/DL (ref 8.6–10.5)
CHLORIDE SERPL-SCNC: 105 MMOL/L (ref 98–107)
CO2 BLDA-SCNC: 26.1 MMOL/L (ref 22–33)
CO2 SERPL-SCNC: 22 MMOL/L (ref 22–29)
COHGB MFR BLD: 1.2 % (ref 0–2)
CREAT SERPL-MCNC: 1.29 MG/DL (ref 0.57–1)
DEPRECATED RDW RBC AUTO: 46.3 FL (ref 37–54)
EGFRCR SERPLBLD CKD-EPI 2021: 48.2 ML/MIN/1.73
EOSINOPHIL # BLD AUTO: 0 10*3/MM3 (ref 0–0.4)
EOSINOPHIL NFR BLD AUTO: 0 % (ref 0.3–6.2)
EPAP: 0
ERYTHROCYTE [DISTWIDTH] IN BLOOD BY AUTOMATED COUNT: 13.6 % (ref 12.3–15.4)
GLOBULIN UR ELPH-MCNC: 2.1 GM/DL
GLUCOSE SERPL-MCNC: 130 MG/DL (ref 65–99)
HCO3 BLDA-SCNC: 24.6 MMOL/L (ref 20–26)
HCT VFR BLD AUTO: 46.7 % (ref 34–46.6)
HCT VFR BLD CALC: 46 % (ref 38–51)
HGB BLD-MCNC: 14.9 G/DL (ref 12–15.9)
HGB BLDA-MCNC: 15 G/DL (ref 14–18)
IMM GRANULOCYTES # BLD AUTO: 0.02 10*3/MM3 (ref 0–0.05)
IMM GRANULOCYTES NFR BLD AUTO: 0.3 % (ref 0–0.5)
INHALED O2 CONCENTRATION: 32 %
IPAP: 0
IRON 24H UR-MRATE: 84 MCG/DL (ref 37–145)
LYMPHOCYTES # BLD AUTO: 1.08 10*3/MM3 (ref 0.7–3.1)
LYMPHOCYTES NFR BLD AUTO: 16.7 % (ref 19.6–45.3)
MCH RBC QN AUTO: 29.2 PG (ref 26.6–33)
MCHC RBC AUTO-ENTMCNC: 31.9 G/DL (ref 31.5–35.7)
MCV RBC AUTO: 91.4 FL (ref 79–97)
METHGB BLD QL: 0.3 % (ref 0–1.5)
MODALITY: ABNORMAL
MONOCYTES # BLD AUTO: 0.14 10*3/MM3 (ref 0.1–0.9)
MONOCYTES NFR BLD AUTO: 2.2 % (ref 5–12)
NEUTROPHILS NFR BLD AUTO: 5.21 10*3/MM3 (ref 1.7–7)
NEUTROPHILS NFR BLD AUTO: 80.8 % (ref 42.7–76)
NOTE: ABNORMAL
NRBC BLD AUTO-RTO: 0 /100 WBC (ref 0–0.2)
OXYHGB MFR BLDV: 96.4 % (ref 94–99)
PAW @ PEAK INSP FLOW SETTING VENT: 0 CMH2O
PCO2 BLDA: 49 MM HG (ref 35–45)
PCO2 TEMP ADJ BLD: 49 MM HG (ref 35–45)
PH BLDA: 7.31 PH UNITS (ref 7.35–7.45)
PH, TEMP CORRECTED: 7.31 PH UNITS
PLATELET # BLD AUTO: 192 10*3/MM3 (ref 140–450)
PMV BLD AUTO: 11.4 FL (ref 6–12)
PO2 BLDA: 93.1 MM HG (ref 83–108)
PO2 TEMP ADJ BLD: 93.1 MM HG (ref 83–108)
POTASSIUM SERPL-SCNC: 5.2 MMOL/L (ref 3.5–5.2)
PREALB SERPL-MCNC: 18.1 MG/DL (ref 20–40)
PROT SERPL-MCNC: 6.3 G/DL (ref 6–8.5)
RBC # BLD AUTO: 5.11 10*6/MM3 (ref 3.77–5.28)
SODIUM SERPL-SCNC: 139 MMOL/L (ref 136–145)
TOTAL RATE: 0 BREATHS/MINUTE
WBC NRBC COR # BLD: 6.45 10*3/MM3 (ref 3.4–10.8)

## 2023-02-17 PROCEDURE — 85025 COMPLETE CBC W/AUTO DIFF WBC: CPT | Performed by: SURGERY

## 2023-02-17 PROCEDURE — 25010000002 ENOXAPARIN PER 10 MG: Performed by: SURGERY

## 2023-02-17 PROCEDURE — 83050 HGB METHEMOGLOBIN QUAN: CPT

## 2023-02-17 PROCEDURE — 99024 POSTOP FOLLOW-UP VISIT: CPT | Performed by: SURGERY

## 2023-02-17 PROCEDURE — 82805 BLOOD GASES W/O2 SATURATION: CPT

## 2023-02-17 PROCEDURE — 82375 ASSAY CARBOXYHB QUANT: CPT

## 2023-02-17 PROCEDURE — 25010000002 CYANOCOBALAMIN PER 1000 MCG: Performed by: SURGERY

## 2023-02-17 PROCEDURE — 36600 WITHDRAWAL OF ARTERIAL BLOOD: CPT

## 2023-02-17 PROCEDURE — 74240 X-RAY XM UPR GI TRC 1CNTRST: CPT

## 2023-02-17 PROCEDURE — 97162 PT EVAL MOD COMPLEX 30 MIN: CPT

## 2023-02-17 PROCEDURE — 84134 ASSAY OF PREALBUMIN: CPT | Performed by: SURGERY

## 2023-02-17 PROCEDURE — 25010000002 CEFAZOLIN PER 500 MG: Performed by: SURGERY

## 2023-02-17 PROCEDURE — 0 DIATRIZOATE MEGLUMINE & SODIUM PER 1 ML: Performed by: SURGERY

## 2023-02-17 PROCEDURE — 83540 ASSAY OF IRON: CPT | Performed by: SURGERY

## 2023-02-17 PROCEDURE — 25010000002 FLUCONAZOLE PER 200 MG: Performed by: SURGERY

## 2023-02-17 PROCEDURE — 80053 COMPREHEN METABOLIC PANEL: CPT | Performed by: SURGERY

## 2023-02-17 PROCEDURE — 25010000002 THIAMINE PER 100 MG: Performed by: SURGERY

## 2023-02-17 RX ADMIN — CARIPRAZINE 1.5 MG: 1.5 CAPSULE, GELATIN COATED ORAL at 09:02

## 2023-02-17 RX ADMIN — OXYCODONE HYDROCHLORIDE 5 MG: 5 TABLET ORAL at 04:31

## 2023-02-17 RX ADMIN — VORTIOXETINE 10 MG: 10 TABLET, FILM COATED ORAL at 21:57

## 2023-02-17 RX ADMIN — GABAPENTIN 300 MG: 300 CAPSULE ORAL at 15:53

## 2023-02-17 RX ADMIN — ALPRAZOLAM 0.5 MG: 0.5 TABLET ORAL at 09:01

## 2023-02-17 RX ADMIN — ALPRAZOLAM 0.5 MG: 0.5 TABLET ORAL at 21:55

## 2023-02-17 RX ADMIN — HYDROMORPHONE HYDROCHLORIDE 2 MG: 2 TABLET ORAL at 09:02

## 2023-02-17 RX ADMIN — FLUCONAZOLE 400 MG: 2 INJECTION, SOLUTION INTRAVENOUS at 00:00

## 2023-02-17 RX ADMIN — GABAPENTIN 300 MG: 300 CAPSULE ORAL at 21:55

## 2023-02-17 RX ADMIN — BISOPROLOL FUMARATE 10 MG: 10 TABLET ORAL at 09:02

## 2023-02-17 RX ADMIN — GABAPENTIN 300 MG: 300 CAPSULE ORAL at 09:02

## 2023-02-17 RX ADMIN — ENOXAPARIN SODIUM 40 MG: 40 INJECTION SUBCUTANEOUS at 21:56

## 2023-02-17 RX ADMIN — ENOXAPARIN SODIUM 40 MG: 40 INJECTION SUBCUTANEOUS at 09:01

## 2023-02-17 RX ADMIN — ALPRAZOLAM 0.5 MG: 0.5 TABLET ORAL at 15:53

## 2023-02-17 RX ADMIN — PANTOPRAZOLE SODIUM 40 MG: 40 INJECTION, POWDER, FOR SOLUTION INTRAVENOUS at 05:00

## 2023-02-17 RX ADMIN — THIAMINE HYDROCHLORIDE 100 ML/HR: 100 INJECTION, SOLUTION INTRAMUSCULAR; INTRAVENOUS at 04:19

## 2023-02-17 RX ADMIN — CYANOCOBALAMIN 1000 MCG: 1000 INJECTION, SOLUTION INTRAMUSCULAR; SUBCUTANEOUS at 09:01

## 2023-02-17 RX ADMIN — DOXEPIN HYDROCHLORIDE 100 MG: 50 CAPSULE ORAL at 21:55

## 2023-02-17 RX ADMIN — HYDROXYZINE PAMOATE 50 MG: 50 CAPSULE ORAL at 21:57

## 2023-02-17 RX ADMIN — POTASSIUM CHLORIDE 20 MEQ: 750 CAPSULE, EXTENDED RELEASE ORAL at 09:01

## 2023-02-17 RX ADMIN — CEFAZOLIN 3 G: 10 INJECTION, POWDER, FOR SOLUTION INTRAVENOUS at 02:04

## 2023-02-17 RX ADMIN — BISOPROLOL FUMARATE 10 MG: 10 TABLET ORAL at 21:55

## 2023-02-17 RX ADMIN — HYDROMORPHONE HYDROCHLORIDE 2 MG: 2 TABLET ORAL at 15:53

## 2023-02-17 NOTE — PROGRESS NOTES
"Bariatric Surgery Progress Note:      Chief Complaint:  POD #1    Subjective     Interval History:  Doing well.  No complaints.  Pain controlled.  Denies N/V.  No fevers.  Minimal ambulation, PT worked with her today and walked 1x in domíng[place].  Currently up in chair.  Voiding.  IS 2000+ observed.    Objective     Vital Signs  Blood pressure 127/84, pulse 64, temperature 97.9 °F (36.6 °C), temperature source Oral, resp. rate 18, height 161.3 cm (63.5\"), weight 134 kg (295 lb), SpO2 98 %.      Intake/Output Summary (Last 24 hours) at 2/17/2023 1655  Last data filed at 2/17/2023 1011  Gross per 24 hour   Intake --   Output 2050 ml   Net -2050 ml       Physical Exam:  General: Alert, NAD  Abdomen: soft, appropriate, incisions okay.  MICHLEE ss.  Saturated dressing 120/24 hours.  Extremities: warm, (+) SCDs       Labs:  Lab Results (last 24 hours)     Procedure Component Value Units Date/Time    Blood Gas, Arterial With Co-Ox [[id number]]  (Abnormal) Collected: 02/17/23 1541    Specimen: Arterial Blood Updated: 02/17/23 1541     Site Right Brachial     David's Test N/A     pH, Arterial 7.308 pH units      Comment: 84 Value below reference range        pCO2, Arterial 49.0 mm Hg      Comment: 83 Value above reference range        pO2, Arterial 93.1 mm Hg      HCO3, Arterial 24.6 mmol/L      Base Excess, Arterial -2.3 mmol/L      Hemoglobin, Blood Gas 15.0 g/dL      Hematocrit, Blood Gas 46.0 %      Oxyhemoglobin 96.4 %      Methemoglobin 0.30 %      Carboxyhemoglobin 1.2 %      CO2 Content 26.1 mmol/L      Temperature 37.0 C      Barometric Pressure for Blood Gas --     Comment: N/A        Modality Nasal Cannula     FIO2 32 %      Rate 0 Breaths/minute      PIP 0 cmH2O      Comment: Meter: [id number]     :  [id number]        IPAP 0     EPAP 0     Note --     pH, Temp Corrected 7.308 pH Units      pCO2, Temperature Corrected 49.0 mm Hg      pO2, Temperature Corrected 93.1 mm Hg     Iron [[id number]]  (Normal) Collected: " 02/17/23 0604    Specimen: Blood Updated: 02/17/23 1227     Iron 84 mcg/dL     Prealbumin [[id number]]  (Abnormal) Collected: 02/17/23 0604    Specimen: Blood Updated: 02/17/23 0917     Prealbumin 18.1 mg/dL     Comprehensive Metabolic Panel [[id number]]  (Abnormal) Collected: 02/17/23 0604    Specimen: Blood Updated: 02/17/23 0809     Glucose 130 mg/dL      BUN 16 mg/dL      Creatinine 1.29 mg/dL      Sodium 139 mmol/L      Potassium 5.2 mmol/L      Chloride 105 mmol/L      CO2 22.0 mmol/L      Calcium 8.7 mg/dL      Total Protein 6.3 g/dL      Albumin 4.2 g/dL      ALT (SGPT) 128 U/L      AST (SGOT) 124 U/L      Alkaline Phosphatase 76 U/L      Total Bilirubin 0.4 mg/dL      Globulin 2.1 gm/dL      Comment: Calculated Result        A/G Ratio 2.0 g/dL      BUN/Creatinine Ratio 12.4     Anion Gap 12.0 mmol/L      eGFR 48.2 mL/min/1.73     Narrative:      GFR Normal >60  Chronic Kidney Disease <60  Kidney Failure <15      CBC & Differential [[id number]]  (Abnormal) Collected: 02/17/23 0604    Specimen: Blood Updated: 02/17/23 0650    Narrative:      The following orders were created for panel order CBC & Differential.  Procedure                               Abnormality         Status                     ---------                               -----------         ------                     CBC Auto Differential[[id number]]        Abnormal            Final result                 Please view results for these tests on the individual orders.    CBC Auto Differential [[id number]]  (Abnormal) Collected: 02/17/23 0604    Specimen: Blood Updated: 02/17/23 0650     WBC 6.45 10*3/mm3      RBC 5.11 10*6/mm3      Hemoglobin 14.9 g/dL      Hematocrit 46.7 %      MCV 91.4 fL      MCH 29.2 pg      MCHC 31.9 g/dL      RDW 13.6 %      RDW-SD 46.3 fl      MPV 11.4 fL      Platelets 192 10*3/mm3      Neutrophil % 80.8 %      Lymphocyte % 16.7 %      Monocyte % 2.2 %      Eosinophil % 0.0 %      Basophil % 0.0 %      Immature Grans % 0.3 %       Neutrophils, Absolute 5.21 10*3/mm3      Lymphocytes, Absolute 1.08 10*3/mm3      Monocytes, Absolute 0.14 10*3/mm3      Eosinophils, Absolute 0.00 10*3/mm3      Basophils, Absolute 0.00 10*3/mm3      Immature Grans, Absolute 0.02 10*3/mm3      nRBC 0.0 /100 WBC             Assessment & Plan     POD # 1 s/p LSG/clarence/liver bx.    Doing well.  UGI normal post-sleeve, images and report reviewed. Prealbumin 18.1.  Creatinine at baseline, AST/ALT slightly elevated, probably due to liver retraction.    Still on 2.5L O2.  Encouraged frequent IS.    Start Xarelto tomorrow.    She does not feel strong enough to go home yet, and requests another day in the hospital.      Continue OOB/ PT/ DVT prophx.

## 2023-02-17 NOTE — CASE MANAGEMENT/SOCIAL WORK
Continued Stay Note  [facility]     Patient Name: Keyla Davila  MRN: [id number]  Today's Date: 2/17/2023    Admit Date: 2/16/2023    Plan: CM update   Discharge Plan     Row Name 02/17/23 1241       Plan    Plan CM update    Plan Comments -Confirmed patient lives with her spouse in [facility]. She is independent of ADLs at baseline. She has a bath bench at home but that's her only DME. Currently on 02 and attempts being made to wean off of the oxygen. Goal is to return home upon discharge - CM will continue to follow- tony [phone]    Final Discharge Disposition Code 01 - home or self-care               Discharge Codes    No documentation.               Expected Discharge Date and Time     Expected Discharge Date Expected Discharge Time    Feb 17, 2023             Tony Silvestre RN

## 2023-02-17 NOTE — THERAPY EVALUATION
Patient Name: Keyla Davila  : 1965    MRN: [id number]                              Today's Date: 2023       Admit Date: 2023    Visit Dx:     ICD-10-CM ICD-9-CM   1. Chronic cholecystitis without calculus  K81.1 575.11   2. Body mass index (BMI) of 50-59.9 in adult ([facility])  Z68.43 V85.43     Patient Active Problem List   Diagnosis   • Spondylosis of lumbar region without myelopathy or radiculopathy   • Degeneration of lumbar or lumbosacral intervertebral disc   • Lumbar stenosis with neurogenic claudication   • Morbid obesity ([facility])   • Anxiety and depression   • Physical deconditioning   • Chronic anticoagulation   • Gait disturbance   • Lymphedema of both lower extremities   • Meralgia paraesthetica, right   • Hypertension   • HLD (hyperlipidemia)   • Acute deep vein thrombosis (DVT) of distal vein of right lower extremity ([facility])   • Dyspepsia   • Kidney infection   • Chronic low back pain   • Endometriosis   • PCOS (polycystic ovarian syndrome)   • Palpitations   • Morbid obesity with body mass index (BMI) of 50.0 to 59.9 in adult ([facility])     Past Medical History:   Diagnosis Date   • Acute deep vein thrombosis (DVT) of distal vein of right lower extremity ([facility]) 2017    DVT x 1; has had 2 superficial vein clots since. On Xarelto   • Anxiety and depression     currently sees psychiatrist and counselor   • Arthritis    • Back problem    • Candidal intertrigo    • Chronic low back pain     DDD; takes gabapentin; PRN Tylenol.   • Chronic venous stasis dermatitis     Right   • CKD (chronic kidney disease), stage III ([facility])    • Diabetes mellitus ([facility])    • Dyspepsia    • Elevated hemoglobin A1c    • Elevated transaminase level    • Endometriosis     s/p partial hysterectomy   • Fatty liver    • HLD (hyperlipidemia)    • Hx of viral pneumonia    • Hypertension    • Kidney infection     bilateral; resulted in sepsis and admission x 2 weeks   • Palpitations     Sees Dr. Alvares   • PCOS (polycystic  ovarian syndrome)     s/p R oopherectomy   • PONV (postoperative nausea and vomiting)    • Rectus diastasis      Past Surgical History:   Procedure Laterality Date   • BROW LIFT Bilateral 2022   •  SECTION  1987    , ; lower transverse incision   • COLONOSCOPY     • DIAGNOSTIC LAPAROSCOPY      x5; last one in ; due to PCOS and endometriosis   • FACIAL COSMETIC SURGERY     • HAND SURGERY Left    • LAPAROSCOPIC HYSTERECTOMY      partial; R oopherectomy- still has L ovary   • TEETH EXTRACTION      dental implants   • TONSILLECTOMY        General Information     Row Name 23 1517          Physical Therapy Time and Intention    Document Type evaluation  -LR     Mode of Treatment physical therapy;individual therapy  -LR     Row Name 23 151          General Information    Patient Profile Reviewed yes  -LR     Prior Level of Function independent:;all household mobility;community mobility;gait;transfer;bed mobility;ADL's  -LR     Existing Precautions/Restrictions other (see comments)  abdominal MICHELE drain, post gastric sleeve precautions  -LR     Barriers to Rehab medically complex  -LR     Row Name 23 151          Living Environment    People in Home spouse;grandchild(keyla);other (see comments)  5 and 8 YO grandchildren for which she is the primary caregiver; spouse has medical issues and cannot provide much assist  -LR     Row Name 23 151          Home Main Entrance    Number of Stairs, Main Entrance one  -LR     Stair Railings, Main Entrance none  -LR     Row Name 23 1517          Stairs Within Home, Primary    Number of Stairs, Within Home, Primary none  -LR     Row Name 23 151          Cognition    Orientation Status (Cognition) oriented x 4  -LR     Row Name 23 151          Safety Issues, Functional Mobility    Safety Issues Affecting Function (Mobility) other (see comments)  none  -LR     Impairments Affecting Function (Mobility)  strength;pain;endurance/activity tolerance;shortness of breath  -LR           User Key  (r) = Recorded By, (t) = Taken By, (c) = Cosigned By    Initials Name Provider Type    LR Gely Molina, PT Physical Therapist               Mobility     Row Name 02/17/23 1517          Bed Mobility    Bed Mobility supine-sit  -LR     Supine-Sit [place] (Bed Mobility) verbal cues;minimum assist (75% patient effort)  -LR     Assistive Device (Bed Mobility) head of bed elevated;bed rails  -LR     Comment, (Bed Mobility) Verbal cues for correct log rolling technique to decrease pain and strain of abdominal muscles with transition to EOB. Educated on use of pillow for splinting. Denied dizziness upon sitting up.  -LR     Row Name 02/17/23 1517          Transfers    Comment, (Transfers) Verbal cues for correct hand placement with t/f and to power up into standing with her LEs. Assisted to and from bathroom.  -LR     Row Name 02/17/23 1517          Bed-Chair Transfer    Bed-Chair [place] (Transfers) not tested  -LR     Row Name 02/17/23 1517          Sit-Stand Transfer    Sit-Stand [place] (Transfers) verbal cues;contact guard  -LR     Assistive Device (Sit-Stand Transfers) other (see comments)  no AD  -LR     Row Name 02/17/23 1517          Gait/Stairs (Locomotion)    [place] Level (Gait) verbal cues;contact guard  -LR     Assistive Device (Gait) other (see comments)  IV Pole  -LR     Distance in Feet (Gait) 500  -LR     Deviations/Abnormal Patterns (Gait) bilateral deviations;grey decreased;gait speed decreased  -LR     Bilateral Gait Deviations forward flexed posture;heel strike decreased  -LR     [place] Level (Stairs) not tested  -LR     Comment, (Gait/Stairs) Patient ambulated with step through gait pattern at slow pace with short steps. Slight improvement with cues for upright posture, increased step length, and increased pace. Gait limited by pain, fatigue, and SOA. O2 sats 96% on 3 L O2/NC  after ambulation, but patient was visibly slightly SOA.  -LR           User Key  (r) = Recorded By, (t) = Taken By, (c) = Cosigned By    Initials Name Provider Type    Gely Boyd, PT Physical Therapist               Obj/Interventions     Row Name 02/17/23 1517          Range of Motion Comprehensive    General Range of Motion bilateral lower extremity ROM WFL  -LR     Row Name 02/17/23 1517          Strength Comprehensive (MMT)    General Manual Muscle Testing (MMT) Assessment lower extremity strength deficits identified  -LR     Row Name 02/17/23 1517          Balance    Balance Assessment sitting static balance;sitting dynamic balance;standing static balance;standing dynamic balance  -LR     Static Sitting Balance independent  -LR     Dynamic Sitting Balance independent  -LR     Position, Sitting Balance unsupported;sitting edge of bed  -LR     Static Standing Balance standby assist  -LR     Dynamic Standing Balance standby assist  -LR     Position/Device Used, Standing Balance supported;other (see comments)  IV Pole  -LR     Row Name 02/17/23 1517          Sensory Assessment (Somatosensory)    Sensory Assessment (Somatosensory) other (see comments);sensation intact  denies numbness/tingling  -LR     Row Name 02/17/23 1517          Lower Extremity (Manual Muscle Testing)    Comment, MMT: Lower Extremity B LEs functionally 4/5, deferred formal MMT d/t s/p abdominal surgery  -LR           User Key  (r) = Recorded By, (t) = Taken By, (c) = Cosigned By    Initials Name Provider Type    Gely Boyd, PT Physical Therapist               Goals/Plan     Row Name 02/17/23 1517          Bed Mobility Goal 1 (PT)    Activity/Assistive Device (Bed Mobility Goal 1, PT) sit to supine/supine to sit  -LR     [place] Level/Cues Needed (Bed Mobility Goal 1, PT) independent  -LR     Time Frame (Bed Mobility Goal 1, PT) long term goal (LTG);5 days  -LR     Progress/Outcomes (Bed Mobility Goal 1, PT)  goal ongoing  -LR     Row Name 02/17/23 1517          Transfer Goal 1 (PT)    Activity/Assistive Device (Transfer Goal 1, PT) sit-to-stand/stand-to-sit  -LR     [place] Level/Cues Needed (Transfer Goal 1, PT) independent  -LR     Time Frame (Transfer Goal 1, PT) long term goal (LTG);5 days  -LR     Progress/Outcome (Transfer Goal 1, PT) goal ongoing  -LR     Row Name 02/17/23 1517          Gait Training Goal 1 (PT)    Activity/Assistive Device (Gait Training Goal 1, PT) gait (walking locomotion)  -LR     [place] Level (Gait Training Goal 1, PT) independent  -LR     Distance (Gait Training Goal 1, PT) 750 feet  -LR     Time Frame (Gait Training Goal 1, PT) long term goal (LTG);5 days  -LR     Progress/Outcome (Gait Training Goal 1, PT) goal ongoing  -LR     Row Name 02/17/23 1517          Stairs Goal 1 (PT)    Activity/Assistive Device (Stairs Goal 1, PT) ascending stairs;descending stairs;step-to-step  -LR     [place] Level/Cues Needed (Stairs Goal 1, PT) standby assist  -LR     Number of Stairs (Stairs Goal 1, PT) 1  -LR     Time Frame (Stairs Goal 1, PT) long term goal (LTG);5 days  -LR     Progress/Outcome (Stairs Goal 1, PT) goal ongoing  -     Row Name 02/17/23 1517          Therapy Assessment/Plan (PT)    Planned Therapy Interventions (PT) balance training;bed mobility training;gait training;home exercise program;patient/family education;strengthening;stair training;transfer training  -LR           User Key  (r) = Recorded By, (t) = Taken By, (c) = Cosigned By    Initials Name Provider Type    LR Gely Molina, PT Physical Therapist               Clinical Impression     Row Name 02/17/23 1517          Pain    Pretreatment Pain Rating 5/10  -LR     Posttreatment Pain Rating 5/10  -LR     Pain Location - Side/Orientation Bilateral  -LR     Pain Location anterior  -LR     Pain Location - abdomen  -LR     Pain Intervention(s) Ambulation/increased activity;Repositioned;Medication (See  MAR);Splinting  -LR     Row Name 02/17/23 1517          Plan of Care Review    Plan of Care Reviewed With patient  -LR     Progress improving  -LR     Outcome Evaluation Patient ambulated with support of IV pole. Limited by fatigue and pain. O2 sats 96% on 3 L O2/NC after ambulation, patient slightly SOA at end of gait. Encouraged frequent ambulation. Educated on use of pillow for splinting. Recommend d/c home with family. Patient demonstrates decreased functional mobility status, decreased cardiovascular endurance, and LE strength. Currently below her baseline functioning. Will continue to progress as able to address these deficits and promote return to PLOF.  -LR     Row Name 02/17/23 1517          Therapy Assessment/Plan (PT)    Patient/Family Therapy Goals Statement (PT) go home, decrease pain  -LR     Rehab Potential (PT) good, to achieve stated therapy goals  -LR     Criteria for Skilled Interventions Met (PT) yes;meets criteria;skilled treatment is necessary  -LR     Therapy Frequency (PT) daily  -LR     Row Name 02/17/23 1517          Vital Signs    Pre SpO2 (%) 95  -LR     O2 Delivery Pre Treatment supplemental O2  -LR     Intra SpO2 (%) 96  -LR     O2 Delivery Intra Treatment supplemental O2  -LR     Post SpO2 (%) 96  -LR     O2 Delivery Post Treatment supplemental O2  -LR     Pre Patient Position Supine  -LR     Intra Patient Position Sitting  -LR     Post Patient Position Sitting  -LR     Row Name 02/17/23 1517          Positioning and Restraints    Pre-Treatment Position in bed  -LR     Post Treatment Position chair  -LR     In Chair notified nsg;reclined;sitting;call light within reach;encouraged to call for assist;exit alarm on;legs elevated;waffle cushion  -LR           User Key  (r) = Recorded By, (t) = Taken By, (c) = Cosigned By    Initials Name Provider Type    LR Gely Molina, PT Physical Therapist               Outcome Measures     Row Name 02/17/23 1517 02/17/23 0800       How much  help from another person do you currently need...    Turning from your back to your side while in flat bed without using bedrails? 4  -LR 4  -AN    Moving from lying on back to sitting on the side of a flat bed without bedrails? 3  -LR 4  -AN    Moving to and from a bed to a chair (including a wheelchair)? 3  -LR 3  -AN    Standing up from a chair using your arms (e.g., wheelchair, bedside chair)? 3  -LR 4  -AN    Climbing 3-5 steps with a railing? 3  -LR 4  -AN    To walk in hospital room? 3  -LR 4  -AN    AM-PAC 6 Clicks Score (PT) 19  -LR 23  -AN    Highest level of mobility 6 --> Walked 10 steps or more  -LR 7 --> Walked 25 feet or more  -AN    Row Name 02/17/23 1517          Functional Assessment    Outcome Measure Options AM-PAC 6 Clicks Basic Mobility (PT)  -LR           User Key  (r) = Recorded By, (t) = Taken By, (c) = Cosigned By    Initials Name Provider Type    Gely Boyd, PT Physical Therapist    Stephanie Tolentino RN Registered Nurse                             Physical Therapy Education     Title: PT OT SLP Therapies (Done)     Topic: Physical Therapy (Done)     Point: Mobility training (Done)     Learning Progress Summary           Patient Acceptance, E,D, VU,NR by LR at 2/17/2023 1517    Comment: Educated on splinting for comfort, correct log rolling technique for comfort, correct sit<->stand t/f technique, correct gait mechanics, safety with mobility, and progression of POC.                   Point: Home exercise program (Done)     Learning Progress Summary           Patient Acceptance, E,D, VU,NR by LR at 2/17/2023 1517    Comment: Educated on splinting for comfort, correct log rolling technique for comfort, correct sit<->stand t/f technique, correct gait mechanics, safety with mobility, and progression of POC.                   Point: Body mechanics (Done)     Learning Progress Summary           Patient Acceptance, E,D, VU,NR by LR at 2/17/2023 1517    Comment: Educated on  splinting for comfort, correct log rolling technique for comfort, correct sit<->stand t/f technique, correct gait mechanics, safety with mobility, and progression of POC.                   Point: Precautions (Done)     Learning Progress Summary           Patient Acceptance, E,D, VU,NR by LR at 2/17/2023 1517    Comment: Educated on splinting for comfort, correct log rolling technique for comfort, correct sit<->stand t/f technique, correct gait mechanics, safety with mobility, and progression of POC.                               User Key     Initials Effective Dates Name Provider Type Discipline    LR 02/03/23 -  Gely Molina, PT Physical Therapist PT              PT Recommendation and Plan  Planned Therapy Interventions (PT): balance training, bed mobility training, gait training, home exercise program, patient/family education, strengthening, stair training, transfer training  Plan of Care Reviewed With: patient  Progress: improving  Outcome Evaluation: Patient ambulated with support of IV pole. Limited by fatigue and pain. O2 sats 96% on 3 L O2/NC after ambulation, patient slightly SOA at end of gait. Encouraged frequent ambulation. Educated on use of pillow for splinting. Recommend d/c home with family. Patient demonstrates decreased functional mobility status, decreased cardiovascular endurance, and LE strength. Currently below her baseline functioning. Will continue to progress as able to address these deficits and promote return to PLOF.     Time Calculation:    PT Charges     Row Name 02/17/23 1517             Time Calculation    Start Time 1517  -LR      PT Received On 02/17/23  -      PT Goal Re-Cert Due Date 02/27/23  -         Untimed Charges    PT Eval/Re-eval Minutes 60  -LR         Total Minutes    Untimed Charges Total Minutes 60  -LR       Total Minutes 60  -LR            User Key  (r) = Recorded By, (t) = Taken By, (c) = Cosigned By    Initials Name Provider Type    LR Gely Molina  Ivette, PT Physical Therapist              Therapy Charges for Today     Code Description Service Date Service Provider Modifiers Qty    39557352362 HC PT EVAL MOD COMPLEXITY 4 2/17/2023 Gely Molina, PT GP 1          PT G-Codes  Outcome Measure Options: AM-PAC 6 Clicks Basic Mobility (PT)  AM-PAC 6 Clicks Score (PT): 19  PT Discharge Summary  Anticipated Discharge Disposition (PT): home with assist    Gely Molina, PT  2/17/2023

## 2023-02-17 NOTE — CASE MANAGEMENT/SOCIAL WORK
Discharge Planning Assessment  [facility]     Patient Name: Keyla Davila  MRN: [id number]  Today's Date: 2/17/2023    Admit Date: 2/16/2023    Plan: CM update   Discharge Needs Assessment     Row Name 02/17/23 1240       Living Environment    People in Home spouse;grandchild(keyla)    Current Living Arrangements home    Primary Care Provided by self    Provides Primary Care For no one    Family Caregiver if Needed spouse    Quality of Family Relationships helpful;involved       Transition Planning    Patient/Family Anticipates Transition to home with family    Patient/Family Anticipated Services at Transition     Transportation Anticipated family or friend will provide       Discharge Needs Assessment    Readmission Within the Last 30 Days no previous admission in last 30 days    Equipment Currently Used at Home none    Concerns to be Addressed discharge planning    Anticipated Changes Related to Illness none    Equipment Needed After Discharge none               Discharge Plan     Row Name 02/17/23 1241       Plan    Plan CM update    Plan Comments -Confirmed patient lives with her spouse in [facility]. She is independent of ADLs at baseline. She has a bath bench at home but that's her only DME. Currently on 02 and attempts being made to wean off of the oxygen. Goal is to return home upon discharge - CM will continue to follow- tony [phone]    Final Discharge Disposition Code 01 - home or self-care              Continued Care and Services - Admitted Since 2/16/2023    Coordination has not been started for this encounter.       Expected Discharge Date and Time     Expected Discharge Date Expected Discharge Time    Feb 17, 2023          Demographic Summary     Row Name 02/17/23 1239       General Information    Admission Type inpatient    Arrived From home    Referral Source admission list    Reason for Consult discharge planning    Preferred Language English       Contact Information     Permission Granted to Share Info With                Functional Status     Row Name 02/17/23 1239       Functional Status    Usual Activity Tolerance good    Current Activity Tolerance moderate       Functional Status, IADL    Medications independent    Meal Preparation independent    Housekeeping independent    Laundry independent    Shopping independent               Psychosocial    No documentation.                Abuse/Neglect    No documentation.                Legal    No documentation.                Substance Abuse    No documentation.                Patient Forms    No documentation.                   Patt Silvestre RN

## 2023-02-17 NOTE — PLAN OF CARE
Goal Outcome Evaluation:  Plan of Care Reviewed With: patient        Progress: improving  Outcome Evaluation: Patient ambulated with support of IV pole. Limited by fatigue and pain. O2 sats 96% on 3 L O2/NC after ambulation, patient slightly SOA at end of gait. Encouraged frequent ambulation. Educated on use of pillow for splinting. Recommend d/c home with family. Patient demonstrates decreased functional mobility status, decreased cardiovascular endurance, and LE strength. Currently below her baseline functioning. Will continue to progress as able to address these deficits and promote return to PLOF.   No

## 2023-02-18 VITALS
HEART RATE: 68 BPM | HEIGHT: 64 IN | OXYGEN SATURATION: 93 % | BODY MASS INDEX: 50.02 KG/M2 | WEIGHT: 293 LBS | SYSTOLIC BLOOD PRESSURE: 123 MMHG | RESPIRATION RATE: 18 BRPM | TEMPERATURE: 98.4 F | DIASTOLIC BLOOD PRESSURE: 75 MMHG

## 2023-02-18 LAB
ALBUMIN SERPL-MCNC: 3.9 G/DL (ref 3.5–5.2)
ALBUMIN/GLOB SERPL: 1.6 G/DL
ALP SERPL-CCNC: 70 U/L (ref 39–117)
ALT SERPL W P-5'-P-CCNC: 68 U/L (ref 1–33)
ANION GAP SERPL CALCULATED.3IONS-SCNC: 8 MMOL/L (ref 5–15)
AST SERPL-CCNC: 67 U/L (ref 1–32)
BASOPHILS # BLD AUTO: 0 10*3/MM3 (ref 0–0.2)
BASOPHILS NFR BLD AUTO: 0 % (ref 0–1.5)
BILIRUB SERPL-MCNC: 0.6 MG/DL (ref 0–1.2)
BUN SERPL-MCNC: 25 MG/DL (ref 6–20)
BUN/CREAT SERPL: 19.4 (ref 7–25)
CALCIUM SPEC-SCNC: 8.7 MG/DL (ref 8.6–10.5)
CHLORIDE SERPL-SCNC: 103 MMOL/L (ref 98–107)
CO2 SERPL-SCNC: 25 MMOL/L (ref 22–29)
CREAT SERPL-MCNC: 1.29 MG/DL (ref 0.57–1)
DEPRECATED RDW RBC AUTO: 47.8 FL (ref 37–54)
EGFRCR SERPLBLD CKD-EPI 2021: 48.2 ML/MIN/1.73
EOSINOPHIL # BLD AUTO: 0 10*3/MM3 (ref 0–0.4)
EOSINOPHIL NFR BLD AUTO: 0 % (ref 0.3–6.2)
ERYTHROCYTE [DISTWIDTH] IN BLOOD BY AUTOMATED COUNT: 14 % (ref 12.3–15.4)
GLOBULIN UR ELPH-MCNC: 2.5 GM/DL
GLUCOSE SERPL-MCNC: 100 MG/DL (ref 65–99)
HCT VFR BLD AUTO: 41.5 % (ref 34–46.6)
HGB BLD-MCNC: 13.5 G/DL (ref 12–15.9)
IMM GRANULOCYTES # BLD AUTO: 0.02 10*3/MM3 (ref 0–0.05)
IMM GRANULOCYTES NFR BLD AUTO: 0.2 % (ref 0–0.5)
LYMPHOCYTES # BLD AUTO: 1.57 10*3/MM3 (ref 0.7–3.1)
LYMPHOCYTES NFR BLD AUTO: 17.2 % (ref 19.6–45.3)
MCH RBC QN AUTO: 29.9 PG (ref 26.6–33)
MCHC RBC AUTO-ENTMCNC: 32.5 G/DL (ref 31.5–35.7)
MCV RBC AUTO: 91.8 FL (ref 79–97)
MONOCYTES # BLD AUTO: 0.42 10*3/MM3 (ref 0.1–0.9)
MONOCYTES NFR BLD AUTO: 4.6 % (ref 5–12)
NEUTROPHILS NFR BLD AUTO: 7.12 10*3/MM3 (ref 1.7–7)
NEUTROPHILS NFR BLD AUTO: 78 % (ref 42.7–76)
NRBC BLD AUTO-RTO: 0 /100 WBC (ref 0–0.2)
PLATELET # BLD AUTO: 209 10*3/MM3 (ref 140–450)
PMV BLD AUTO: 11.7 FL (ref 6–12)
POTASSIUM SERPL-SCNC: 4.7 MMOL/L (ref 3.5–5.2)
PROT SERPL-MCNC: 6.4 G/DL (ref 6–8.5)
RBC # BLD AUTO: 4.52 10*6/MM3 (ref 3.77–5.28)
SODIUM SERPL-SCNC: 136 MMOL/L (ref 136–145)
WBC NRBC COR # BLD: 9.13 10*3/MM3 (ref 3.4–10.8)

## 2023-02-18 PROCEDURE — 25010000002 FLUCONAZOLE PER 200 MG: Performed by: SURGERY

## 2023-02-18 PROCEDURE — 80053 COMPREHEN METABOLIC PANEL: CPT | Performed by: SURGERY

## 2023-02-18 PROCEDURE — 85025 COMPLETE CBC W/AUTO DIFF WBC: CPT | Performed by: SURGERY

## 2023-02-18 PROCEDURE — 0 POTASSIUM CHLORIDE PER 2 MEQ: Performed by: SURGERY

## 2023-02-18 PROCEDURE — 25010000002 ENOXAPARIN PER 10 MG: Performed by: SURGERY

## 2023-02-18 PROCEDURE — 99024 POSTOP FOLLOW-UP VISIT: CPT | Performed by: SURGERY

## 2023-02-18 RX ORDER — OMEPRAZOLE 40 MG/1
40 CAPSULE, DELAYED RELEASE ORAL DAILY
Qty: 60 CAPSULE | Refills: 0 | Status: SHIPPED | OUTPATIENT
Start: 2023-02-18 | End: 2023-04-19

## 2023-02-18 RX ORDER — OXYCODONE HYDROCHLORIDE 5 MG/1
5 TABLET ORAL EVERY 4 HOURS PRN
Qty: 10 TABLET | Refills: 0 | Status: SHIPPED | OUTPATIENT
Start: 2023-02-18 | End: 2023-03-15

## 2023-02-18 RX ORDER — ONDANSETRON 4 MG/1
4 TABLET, FILM COATED ORAL EVERY 4 HOURS PRN
Qty: 8 TABLET | Refills: 0 | Status: SHIPPED | OUTPATIENT
Start: 2023-02-18

## 2023-02-18 RX ADMIN — ENOXAPARIN SODIUM 40 MG: 40 INJECTION SUBCUTANEOUS at 09:02

## 2023-02-18 RX ADMIN — POTASSIUM CHLORIDE AND SODIUM CHLORIDE 125 ML/HR: 450; 150 INJECTION, SOLUTION INTRAVENOUS at 09:02

## 2023-02-18 RX ADMIN — PANTOPRAZOLE SODIUM 40 MG: 40 INJECTION, POWDER, FOR SOLUTION INTRAVENOUS at 06:03

## 2023-02-18 RX ADMIN — POTASSIUM CHLORIDE 20 MEQ: 750 CAPSULE, EXTENDED RELEASE ORAL at 09:02

## 2023-02-18 RX ADMIN — FLUCONAZOLE 400 MG: 2 INJECTION, SOLUTION INTRAVENOUS at 01:47

## 2023-02-18 RX ADMIN — HYDROMORPHONE HYDROCHLORIDE 2 MG: 2 TABLET ORAL at 12:09

## 2023-02-18 RX ADMIN — POTASSIUM CHLORIDE AND SODIUM CHLORIDE 125 ML/HR: 450; 150 INJECTION, SOLUTION INTRAVENOUS at 01:09

## 2023-02-18 RX ADMIN — ACETAMINOPHEN 1000 MG: 500 TABLET ORAL at 06:56

## 2023-02-18 RX ADMIN — ALPRAZOLAM 0.5 MG: 0.5 TABLET ORAL at 09:02

## 2023-02-18 RX ADMIN — CARIPRAZINE 1.5 MG: 1.5 CAPSULE, GELATIN COATED ORAL at 09:02

## 2023-02-18 RX ADMIN — GABAPENTIN 300 MG: 300 CAPSULE ORAL at 09:02

## 2023-02-18 RX ADMIN — BISOPROLOL FUMARATE 10 MG: 10 TABLET ORAL at 09:06

## 2023-02-18 NOTE — PROGRESS NOTES
"Cc: POD#2 LSG/clarence  \"feel ok\"    She is sitting up in bed alone in the room wearing oxygen by nasal cannula.  Overall she feels okay and feels she is ready to go home.  Ambulating and voiding okay.  Passing flatus, no bowel movement.  Tolerating her diet without nausea.  Spirometer 1500 observed, no pulmonary complaints.    No fever or tachycardia pulse 60 respirations 18 blood pressure 123/75 saturation 93% UO 1800 - NR  MICHELE 110 - NR SS, not enteric or bloody.  She is in no apparent distress.  Lungs clear to auscultation.  Abdomen soft, nontender/appropriate, nondistended, bowel sounds present.  Wounds look okay    CMP normal except glucose of 100 BUN 25 creatinine 1.29 ALT 68 AST 67.  GFR 48.  White count normal at 9.13 with 78 segs 17 lymphs 5 monocytes no bands H&H 13.5 and 41.5.  Blood gas yesterday pH 7.308, PCO2 49 PO2 93 base excess -2.3 saturation 96% on 32% FiO2.  Prealbumin 18.1    Impression: Postop day #2 laparoscopic sleeve gastrectomy, cholecystectomy, and liver biopsy.  Pathology pending.  Overall doing okay, wants to go home, and does meet discharge criteria.  CKD stage III stable.  Elevated transaminases likely related to fatty liver, liver biopsy, and liver retraction during surgery, improving.  Low prealbumin despite super morbid obesity.  Requiring oxygen by nasal cannula, may have undiagnosed obstructive sleep apnea.  Suboptimal incentive spirometry, encouraged to continue working on improving at home.  On chronic anticoagulation with Xarelto.    Plan: Discharge home.  She takes her Xarelto in the evening she is instructed to resume taking it this evening.  Discontinue MICHELE drain.  Reiterated avoiding ulcerogenic agents and she voiced understanding.  She said her  smokes outside the back laundry room, not in the house.  Discussed working with her primary care provider to hopefully get off Trulicity.  See orders.  Call with any problems questions or concerns.  "

## 2023-02-18 NOTE — PLAN OF CARE
Goal Outcome Evaluation:              Outcome Evaluation: VSS, AOx4 on 2-3 L NC. Pt able to tolerate diet and ambulation. PRN meds given for complaints of pain. NSR-SB on monitor. Mild SOA noted wiith activity. IS education provided. Incision sites CDI. MICHELE drain output serosanguenous with mild drainage on dressing. Adequate UOP during the night. No acute changes during the night. Will continue to monitor closely and provide care as appropriate prer provider orders.

## 2023-02-20 NOTE — DISCHARGE SUMMARY
Date of admission:   12/16/2023    Date of discharge:   12/18/2023    Admission Diagnosis:   Super morbid Obesity with Multiple Co-morbidities, chronic cholecystitis without cholelithiasis, fatty liver    Discharge Diagnosis:  Same    Principal Procedure Performed:   Laparoscopic sleeve gastrectomy, laparoscopic cholecystectomy, EGD, laparoscopic Tino-Cut liver biopsies 2/16/2023    Other procedures performed: Upper GI 2/17/2023    Complications: None    Consultations: None    History of present illness:  This is a 57-year-old super morbidly obese patient who presents for elective laparoscopic sleeve gastrectomy, cholecystectomy, EGD and possible liver biopsy for presumed VALLEJO.  The preoperative testing and evaluation is in order and the patient is admitted for elective surgery.    Hospital Course:  The patient was admitted and underwent uneventful surgery as described.  Her liver appeared enlarged smooth fatty with a nutmeg color no visible nodularity, path pending at discharge.  Postoperatively the patient was transferred to the bariatric telemetry unit.  Physical therapy consulted.  Upper GI on postoperative day #1 was unremarkable.  She was doing fairly well clinically but did not feel she was ready to go home.  Prealbumin returned at 18.1.  Was still requiring oxygen by nasal cannula.  At the time of discharge on postoperative day #2 the patient is tolerating a diet and pain is controlled with oral medication.  The patient is afebrile and abdominal exam is appropriate, wounds look okay.  Discharge CMP normal except glucose of 100 BUN 25 creatinine 1.29 ALT 68 AST 67.  GFR 48.  White count normal at 9.13 with 78 segs 17 lymphs 5 monocytes no bands H&H 13.5 and 41.5.  Blood gas yesterday pH 7.308, PCO2 49 PO2 93 base excess -2.3 saturation 96% on 32% FiO2.   The patient is discharged home in good condition.  MICHELE drain removed.  Discharge instructions were discussed.  The medication reconciliation has been  completed.  She takes her Xarelto in the evening she is instructed to resume taking it this evening.  Discontinue MICHELE drain.  Reiterated avoiding ulcerogenic agents and she voiced understanding.  She said her  smokes outside the back laundry room, not in the house.  Discussed working with her primary care provider to hopefully get off Trulicity.  See orders.  Call with any problems questions or concerns.  Follow-up in the office in 1 to 2 weeks.

## 2023-02-21 LAB
CYTO UR: NORMAL
LAB AP CASE REPORT: NORMAL
LAB AP CLINICAL INFORMATION: NORMAL
LAB AP INTRADEPARTMENTAL CONSULT: NORMAL
PATH REPORT.FINAL DX SPEC: NORMAL
PATH REPORT.GROSS SPEC: NORMAL

## 2023-02-22 ENCOUNTER — OFFICE VISIT (OUTPATIENT)
Dept: BARIATRICS/WEIGHT MGMT | Facility: CLINIC | Age: 58
End: 2023-02-22
Payer: COMMERCIAL

## 2023-02-22 VITALS
DIASTOLIC BLOOD PRESSURE: 84 MMHG | WEIGHT: 280 LBS | SYSTOLIC BLOOD PRESSURE: 128 MMHG | RESPIRATION RATE: 18 BRPM | TEMPERATURE: 97.5 F | HEART RATE: 70 BPM | OXYGEN SATURATION: 98 % | HEIGHT: 64 IN | BODY MASS INDEX: 47.8 KG/M2

## 2023-02-22 DIAGNOSIS — Z98.84 STATUS POST BARIATRIC SURGERY: ICD-10-CM

## 2023-02-22 DIAGNOSIS — E66.01 OBESITY, CLASS III, BMI 40-49.9 (MORBID OBESITY): ICD-10-CM

## 2023-02-22 DIAGNOSIS — K74.00 LIVER FIBROSIS: Primary | ICD-10-CM

## 2023-02-22 PROCEDURE — 99024 POSTOP FOLLOW-UP VISIT: CPT | Performed by: PHYSICIAN ASSISTANT

## 2023-02-22 NOTE — PROGRESS NOTES
[facility]ariat[facility]  [address]  [address]  [place], located within [facility] [id number] [phone]      Patient Name:  Keyla Davila.  :  1965      Reason for Visit:  POD #6    HPI:  Keyla Davila is a 57 y.o. female s/p LSG/ clarence/ liver biopsy by  on 23    POD#1- on 2.5L O2 d/c POD#2- d/c home, start Xarelto    Doing well.  Has been getting better each day, has abd soreness at incisions, worse with bending over.  Has one pain pill left, doesn't think tylenol helps much. No other issues/concerns. No nausea. Denies dysphagia, reflux, nausea, vomiting, pulmonary issues and fevers.  Tolerating diet progression - on stage 1.  Using IS to . Getting 90g prot/day- 3 shakes a day.   Drinking 64+ fluid oz/day. Hasn't started vitamins yet.   On Omeprazole . On chronic xarelto.   Holding ASA , NSAIDs , Tramadol, Hormones, Diuretics , Steroids and Immunologics and tobacco use/ exposure.  Ambulating.     Presurgery weight: 295 pounds.  Today's weight is 127 kg (280 lb) pounds, today's  Body mass index is 48.82 kg/m²., and@ weight loss since surgery is 15 pounds.       Final Diagnosis   1. Gallbladder, cholecystectomy:  Chronic cholecystitis with cholesterolosis  2.   Stomach, subtotal gastrectomy:  Portion of stomach (20 x 4.8 x 3.6 cm) negative for significant diagnostic abnormality  3.   Liver, biopsy:  Steatohepatitis, grade 2, stage 2, see microscopic description     Microscopic Description    Histologic sections show multiple liver core biopsies with an adequate number of portal tracts for evaluation.  There is significant steatosis (70%) which is predominantly macrovesicular.  There is mild acute inflammation within the lobules.  Rare ballooning degeneration of hepatocytes is seen. Portal tracts show minimal chronic inflammation composed predominantly of lymphocytes.  There is no bile ductular proliferation.  Trichrome stain highlights perivenular and periportal fibrosis,  without bridging.  Iron stain is essentially negative for iron stores.           Past Medical History:   Diagnosis Date   • Acute deep vein thrombosis (DVT) of distal vein of right lower extremity (HCC) 2017    DVT x 1; has had 2 superficial vein clots since. On Xarelto   • Anxiety and depression     currently sees psychiatrist and counselor   • Arthritis    • Back problem    • Candidal intertrigo    • Chronic low back pain     DDD; takes gabapentin; PRN Tylenol.   • Chronic venous stasis dermatitis     Right   • CKD (chronic kidney disease), stage III (HCC)    • Diabetes mellitus (HCC)    • Dyspepsia    • Elevated hemoglobin A1c    • Elevated transaminase level    • Endometriosis     s/p partial hysterectomy   • Fatty liver    • HLD (hyperlipidemia)    • Hx of viral pneumonia    • Hypertension    • Kidney infection     bilateral; resulted in sepsis and admission x 2 weeks   • Palpitations     Sees Dr. Alvares   • PCOS (polycystic ovarian syndrome)     s/p R oopherectomy   • PONV (postoperative nausea and vomiting)    • Rectus diastasis      Past Surgical History:   Procedure Laterality Date   • BROW LIFT Bilateral 2022   •  SECTION  1987, ; lower transverse incision   • CHOLECYSTECTOMY N/A 2023    Procedure: CHOLECYSTECTOMY LAPAROSCOPIC;  Surgeon: Bhupendra Vee MD;  Location:  [facility];  Service: General;  Laterality: N/A;   • COLONOSCOPY     • DIAGNOSTIC LAPAROSCOPY      x5; last one in ; due to PCOS and endometriosis   • ENDOSCOPY N/A 2023    Procedure: ESOPHAGOGASTRODUODENOSCOPY;  Surgeon: Bhupendra Vee MD;  Location:  [facility];  Service: Bariatric;  Laterality: N/A;  Scope ID: [id number]   • FACIAL COSMETIC SURGERY     • GASTRECTOMY N/A 2023    Procedure: GASTRECTOMY LAPAROSCOPIC;  Surgeon: Bhupendra Vee MD;  Location:  [facility];  Service: Bariatric;  Laterality: N/A;   • HAND SURGERY Left    • LAPAROSCOPIC HYSTERECTOMY      partial; R  oopherectomy- still has L ovary   • LIVER BIOPSY N/A 2/16/2023    Procedure: LIVER BIOPSY LAPAROSCOPIC;  Surgeon: Bhupendra Vee MD;  Location: [facility];  Service: Bariatric;  Laterality: N/A;   • TEETH EXTRACTION      dental implants   • TONSILLECTOMY  1966     Outpatient Medications Marked as Taking for the 2/22/23 encounter (Office Visit) with Dory Yancey PA-C   Medication Sig Dispense Refill   • ALPRAZolam (XANAX) 0.5 MG tablet Take 0.5 mg by mouth 3 (Three) Times a Day.     • bisoprolol (ZEBeta) 10 MG tablet Take 10 mg by mouth 2 (two) times a day.     • Cholecalciferol (Vitamin D3) 50 MCG (2000 UT) tablet Take 1 tablet by mouth Daily.     • doxepin (SINEquan) 50 MG capsule Take 50 mg by mouth Daily.     • Dulaglutide (Trulicity) 0.75 MG/0.5ML solution pen-injector Inject 0.75 mg under the skin into the appropriate area as directed Every 7 (Seven) Days. On Mondays     • gabapentin (NEURONTIN) 300 MG capsule Take 1 capsule by mouth 3 (Three) Times a Day. 90 capsule 0   • hydrOXYzine pamoate (VISTARIL) 50 MG capsule Take 50 mg by mouth Daily.     • omeprazole (priLOSEC) 40 MG capsule Take 1 capsule by mouth Daily for 60 days. 60 capsule 0   • oxyCODONE (Roxicodone) 5 MG immediate release tablet Take 1 tablet by mouth Every 4 (Four) Hours As Needed for Moderate Pain. 10 tablet 0   • potassium chloride (K-DUR,KLOR-CON) 20 MEQ CR tablet Take 20 mEq by mouth Daily.     • Vortioxetine HBr (TRINTELLIX) 10 MG tablet tablet Take 10 mg by mouth Daily.     • Vraylar 1.5 MG capsule capsule Take 1.5 mg by mouth Daily.     • Xarelto 20 MG tablet Take 20 mg by mouth Daily.       No Known Allergies    Social History     Socioeconomic History   • Marital status:      Spouse name: Anand   • Number of children: 2   • Highest education level: Master's degree (e.g., MA, MS, Pina, MEd, MSW, STEPHANIE)   Tobacco Use   • Smoking status: Never   • Smokeless tobacco: Never   Vaping Use   • Vaping Use: Never used  "  Substance and Sexual Activity   • Alcohol use: Not Currently     Comment: Occ   • Drug use: Never   • Sexual activity: Defer       /84 (BP Location: Left arm, Patient Position: Sitting)   Pulse 70   Temp 97.5 °F (36.4 °C)   Resp 18   Ht 161.3 cm (63.5\")   Wt 127 kg (280 lb)   SpO2 98%   BMI 48.82 kg/m²   Physical Exam  Constitutional:       Appearance: She is well-developed. She is obese.   HENT:      Head: Normocephalic and atraumatic.   Cardiovascular:      Rate and Rhythm: Normal rate and regular rhythm.   Pulmonary:      Effort: Pulmonary effort is normal.      Breath sounds: Normal breath sounds.   Abdominal:      General: Bowel sounds are normal.      Palpations: Abdomen is soft.      Comments: Incisions healing well  MICHELE site with skin edges open, no drainage or redness   Skin:     General: Skin is warm and dry.   Neurological:      Mental Status: She is alert.   Psychiatric:         Mood and Affect: Mood normal.         Behavior: Behavior normal.         Thought Content: Thought content normal.         Judgment: Judgment normal.           Assessment:   POD #6  s/p LSG/ clarence/ liver biopsy by  on 2/16/23    ICD-10-CM ICD-9-CM   1. Liver fibrosis  K74.00 571.5   2. Obesity, Class III, BMI 40-49.9 (morbid obesity) (HCC)  E66.01 278.01   3. Status post bariatric surgery  Z98.84 V45.86           Plan:  Doing okay, abdominal pain seems expected at this point postop, can use heating pad, tylenol, will update Dr. Vee. Discussed pathology, gave copy of liver Bx report to pt, will send to PCP and also refer to GI for liver fibrosis. Advised avoiding HFCS for life.  Continue to advance diet per manual.  Increase protein intake to 100g/day.  Increase exercise/activity as tolerated.  Reviewed lifting restrictions, nothing >25 lbs x 2 more weeks.  Start vitamins. Continue home xarelto.  Continue PPI.  Continue to avoid ASA/NSAIDs/tramadol/tobacco x 6 weeks postop, steroids x 8 weeks postop.  Call " w/ problems/concerns.    Class 3 Severe Obesity (BMI >=40). Obesity-related health conditions include the following: as above. Obesity is improving with treatment. BMI is is above average; BMI management plan is completed. We discussed low calorie, low carb based diet program, portion control and increasing exercise.        The patient was instructed to follow up in 3 weeks, sooner if needed.

## 2023-03-14 ENCOUNTER — OFFICE VISIT (OUTPATIENT)
Dept: GASTROENTEROLOGY | Facility: CLINIC | Age: 58
End: 2023-03-14
Payer: COMMERCIAL

## 2023-03-14 VITALS
HEIGHT: 64 IN | WEIGHT: 272.8 LBS | SYSTOLIC BLOOD PRESSURE: 131 MMHG | DIASTOLIC BLOOD PRESSURE: 77 MMHG | TEMPERATURE: 97.3 F | BODY MASS INDEX: 46.57 KG/M2 | HEART RATE: 80 BPM

## 2023-03-14 DIAGNOSIS — K76.0 HEPATIC STEATOSIS: Primary | ICD-10-CM

## 2023-03-14 PROCEDURE — 99214 OFFICE O/P EST MOD 30 MIN: CPT | Performed by: NURSE PRACTITIONER

## 2023-03-14 NOTE — PROGRESS NOTES
GASTROENTEROLOGY OFFICE NOTE  Keyla Davila  [id number]  1965    CARE TEAM  Patient Care Team:  Eloy Estrada MD as PCP - General (Family Medicine)  Yomi Razo MD as Consulting Physician (Pain Medicine)  Cammie Hsieh APRN as Nurse Practitioner (Pain Medicine)    Referring Provider: Dr. Vee    Chief Complaint   Patient presents with   • Hepatic Disease        HISTORY OF PRESENT ILLNESS:  Ms. Davila is a 57-year-old female with medical history that includes hypertension, hyperlipidemia, obesity, chronic kidney disease and history of DVT seen today after a liver biopsy that was obtained during gastric sleeve surgery that showed steatosis, grade 2, stage II and fibrosis without bridging.  Liver enzymes elevated; AST 67/ALT 68/ALP 70.    PAST MEDICAL HISTORY  Past Medical History:   Diagnosis Date   • Acute deep vein thrombosis (DVT) of distal vein of right lower extremity (HCC) 2017    DVT x 1; has had 2 superficial vein clots since. On Xarelto   • Anxiety and depression     currently sees psychiatrist and counselor   • Arthritis    • Back problem    • Candidal intertrigo    • Chronic low back pain     DDD; takes gabapentin; PRN Tylenol.   • Chronic venous stasis dermatitis     Right   • CKD (chronic kidney disease), stage III (HCC)    • Diabetes mellitus (HCC)    • Dyspepsia    • Elevated hemoglobin A1c    • Elevated transaminase level    • Endometriosis     s/p partial hysterectomy   • Fatty liver    • HLD (hyperlipidemia)    • Hx of viral pneumonia    • Hypertension    • Kidney infection     bilateral; resulted in sepsis and admission x 2 weeks   • Palpitations     Sees Dr. Alvares   • PCOS (polycystic ovarian syndrome)     s/p R oopherectomy   • PONV (postoperative nausea and vomiting)    • Rectus diastasis         PAST SURGICAL HISTORY  Past Surgical History:   Procedure Laterality Date   • BARIATRIC SURGERY     • BROW LIFT Bilateral 2022   •  SECTION  1987,  1989; lower transverse incision   • CHOLECYSTECTOMY N/A 02/16/2023    Procedure: CHOLECYSTECTOMY LAPAROSCOPIC;  Surgeon: Bhupendra Vee MD;  Location:  [facility];  Service: General;  Laterality: N/A;   • COLONOSCOPY     • DIAGNOSTIC LAPAROSCOPY      x5; last one in 2006; due to PCOS and endometriosis   • ENDOSCOPY N/A 02/16/2023    Procedure: ESOPHAGOGASTRODUODENOSCOPY;  Surgeon: Bhupendra Vee MD;  Location:  [facility];  Service: Bariatric;  Laterality: N/A;  Scope ID: [id number]   • FACIAL COSMETIC SURGERY  2021   • GASTRECTOMY N/A 02/16/2023    Procedure: GASTRECTOMY LAPAROSCOPIC;  Surgeon: Bhupendra Vee MD;  Location:  [facility];  Service: Bariatric;  Laterality: N/A;   • HAND SURGERY Left 1983   • LAPAROSCOPIC HYSTERECTOMY  1994    partial; R oopherectomy- still has L ovary   • LIVER BIOPSY N/A 02/16/2023    Procedure: LIVER BIOPSY LAPAROSCOPIC;  Surgeon: Bhupendra Vee MD;  Location:  [facility];  Service: Bariatric;  Laterality: N/A;   • TEETH EXTRACTION      dental implants   • TONSILLECTOMY  1966   • UPPER GASTROINTESTINAL ENDOSCOPY          MEDICATIONS:    Current Outpatient Medications:   •  ALPRAZolam (XANAX) 0.5 MG tablet, Take 1 tablet by mouth 3 (Three) Times a Day., Disp: , Rfl:   •  bisoprolol (ZEBeta) 10 MG tablet, Take 1 tablet by mouth 2 (two) times a day., Disp: , Rfl:   •  Cholecalciferol (Vitamin D3) 50 MCG (2000 UT) tablet, Take 1 tablet by mouth Daily., Disp: , Rfl:   •  doxepin (SINEquan) 50 MG capsule, Take 1 capsule by mouth Daily., Disp: , Rfl:   •  Dulaglutide (Trulicity) 0.75 MG/0.5ML solution pen-injector, Inject 0.75 mg under the skin into the appropriate area as directed Every 7 (Seven) Days. On Mondays, Disp: , Rfl:   •  gabapentin (NEURONTIN) 300 MG capsule, Take 1 capsule by mouth 3 (Three) Times a Day., Disp: 90 capsule, Rfl: 0  •  hydrOXYzine pamoate (VISTARIL) 50 MG capsule, Take 1 capsule by mouth Daily., Disp: , Rfl:   •  omeprazole (priLOSEC) 40 MG capsule,  Take 1 capsule by mouth Daily for 60 days., Disp: 60 capsule, Rfl: 0  •  ondansetron (Zofran) 4 MG tablet, Take 1 tablet by mouth Every 4 (Four) Hours As Needed for Nausea., Disp: 8 tablet, Rfl: 0  •  potassium chloride (K-DUR,KLOR-CON) 20 MEQ CR tablet, Take 1 tablet by mouth Daily., Disp: , Rfl:   •  Vortioxetine HBr (TRINTELLIX) 10 MG tablet tablet, Take 1 tablet by mouth Daily., Disp: , Rfl:   •  Vraylar 1.5 MG capsule capsule, Take 1 capsule by mouth Daily., Disp: , Rfl:   •  Xarelto 20 MG tablet, Take 1 tablet by mouth Daily., Disp: , Rfl:   •  oxyCODONE (Roxicodone) 5 MG immediate release tablet, Take 1 tablet by mouth Every 4 (Four) Hours As Needed for Moderate Pain. (Patient not taking: Reported on 3/14/2023), Disp: 10 tablet, Rfl: 0    ALLERGIES  No Known Allergies    FAMILY HISTORY:  Family History   Problem Relation Age of Onset   • Arthritis Mother    • Cancer Mother    • Heart disease Mother    • Hypertension Mother    • Liver disease Mother    • Kidney disease Mother    • Heart disease Father    • Hypertension Father    • Alcohol abuse Father    • Cirrhosis Father    • Rheum arthritis Sister    • Clotting disorder Brother    • Colon cancer Paternal Aunt    • Colon cancer Paternal Uncle    • Alcohol abuse Paternal Uncle    • Colon cancer Maternal Grandfather    • Cancer Maternal Grandfather    • Hearing loss Maternal Grandfather    • Hypertension Maternal Grandfather    • Cancer Paternal Grandfather    • Drug abuse Daughter        SOCIAL HISTORY  Social History     Socioeconomic History   • Marital status:      Spouse name: Anand   • Number of children: 2   • Highest education level: Master's degree (e.g., MA, MS, Pina, MEd, MSW, STEPHANIE)   Tobacco Use   • Smoking status: Never   • Smokeless tobacco: Never   Vaping Use   • Vaping Use: Never used   Substance and Sexual Activity   • Alcohol use: Not Currently     Comment: Occ   • Drug use: Never   • Sexual activity: Defer         PHYSICAL EXAM   BP  "131/77 (BP Location: Right arm, Patient Position: Sitting, Cuff Size: Adult)   Pulse 80   Temp 97.3 °F (36.3 °C) (Temporal)   Ht 161.3 cm (63.5\")   Wt 124 kg (272 lb 12.8 oz)   BMI 47.57 kg/m²   Physical Exam  Constitutional:       General: She is not in acute distress.     Appearance: She is well-developed.   HENT:      Head: Normocephalic and atraumatic.      Nose: Nose normal.   Eyes:      Conjunctiva/sclera: Conjunctivae normal.      Pupils: Pupils are equal, round, and reactive to light.   Pulmonary:      Effort: Pulmonary effort is normal.   Neurological:      Mental Status: She is alert and oriented to person, place, and time.   Psychiatric:         Behavior: Behavior normal.         Judgment: Judgment normal.           Results Review:  Surgical Pathology Report                         Case: [id number]                                   Authorizing Provider:  Bhupendra Vee MD    Collected:           02/16/2023 09:53 AM           Ordering Location:     [facility]   Received:            02/16/2023 11:53 AM                                  OR                                                                            Pathologist:           Emily Norwood DO                                                        Specimens:   1) - Gallbladder                                                                                     2) - Stomach, SUB-TOTAL GASTRECTOMY                                                                  3) - Liver, liver biopsy for permanent                                                     Clinical Information    Body mass index (BMI) of 50-59.9 in adult (HCC)  Chronic cholecystitis without calculus   Final Diagnosis   1. Gallbladder, cholecystectomy:  Chronic cholecystitis with cholesterolosis  2.   Stomach, subtotal gastrectomy:  Portion of stomach (20 x 4.8 x 3.6 cm) negative for significant diagnostic abnormality  3.   Liver, biopsy:  Steatohepatitis, grade " 2, stage 2, see microscopic description   Electronically signed by Emily Norwood DO on 2/21/2023 at 1232   Intradepartmental Consult    Dr. Jd Best has seen the case and concurs with the diagnosis.   Gross Description    1. Gallbladder.  Received in formalin labeled gallbladder is a 9 x 3.8 x 1.6 cm intact, unopened gallbladder with clamped cystic duct and tan, wrinkled serosa covered in yellow adipose tissue.  The lumen contains a moderate amount of red viscous bile.  No choleliths are present.  The mucosa is red and velvety and the wall thickness averages 0.2 cm.  No masses or mucosal polyps are present.  Representative sections to include the en face cystic duct margin, neck, body and fundus are submitted in a single cassette. HDM     2. Stomach.  Received in formalin labeled subtotal gastrectomy is a 20 x 4.8 x 3.6 cm intact, unopened portion of stomach with a staple line running the length of the specimen.  The serosa is tan-pink with minimal attached fat.  No significant surgical disruption is present.  The lumen contains a moderate amount of dark red viscous blood and the mucosa is red-pink and congested with normal, prominent rugal folds and minimal mucosal sloughing.  No polyps, ulcers or areas of mucosal thickening are present.  Representative sections are submitted in blocks 1A-1C.  HDM     3. Liver.  Received in formalin labeled liver biopsy are at least 5 fragments of yellow-tan, cylindrical soft tissue ranging from 0.3 cm long to 1.2 cm long submitted entirely in block 3A. HDM      Microscopic Description    Histologic sections show multiple liver core biopsies with an adequate number of portal tracts for evaluation.  There is significant steatosis (70%) which is predominantly macrovesicular.  There is mild acute inflammation within the lobules.  Rare ballooning degeneration of hepatocytes is seen. Portal tracts show minimal chronic inflammation composed predominantly of lymphocytes.  There  is no bile ductular proliferation.  Trichrome stain highlights perivenular and periportal fibrosis, without bridging.  Iron stain is essentially negative for iron stores.         ASSESSMENT / PLAN  Diagnoses and all orders for this visit:    1. Hepatic steatosis (Primary)      >>Weight loss generally reduces HS, achieved either by hypocaloric diet alone or in conjunction with increased physical activity. A combination of a hypocaloric diet (daily reduction by 500-1,000 kcal) and moderate-intensity exercise is likely to provide the best likelihood of sustaining weight loss over time.  Weight loss of at least 3%-5% of body weight appears necessary to improve steatosis.    >> Continue to follow with PCP for control of metabolic disorders  >> Three cups of black coffee a day has been shown to decrease inflammation in the liver when related to fatty liver  Suggest moderate consumption of 2-3 cups of coffee a day;the overall trend emerging from the literature review suggests that regular coffee consumption might be a protective factor against the evolution of NAFLD complications, especially liver fibrosis      Return in about 6 months (around 9/14/2023).    I discussed the patients findings and my recommendations with patient    ARLENE Jaime

## 2023-03-15 ENCOUNTER — OFFICE VISIT (OUTPATIENT)
Dept: BARIATRICS/WEIGHT MGMT | Facility: CLINIC | Age: 58
End: 2023-03-15
Payer: COMMERCIAL

## 2023-03-15 VITALS
RESPIRATION RATE: 18 BRPM | HEIGHT: 64 IN | HEART RATE: 82 BPM | SYSTOLIC BLOOD PRESSURE: 124 MMHG | BODY MASS INDEX: 46.26 KG/M2 | DIASTOLIC BLOOD PRESSURE: 80 MMHG | OXYGEN SATURATION: 97 % | TEMPERATURE: 97.5 F | WEIGHT: 271 LBS

## 2023-03-15 DIAGNOSIS — Z90.3 POSTGASTRECTOMY MALABSORPTION: ICD-10-CM

## 2023-03-15 DIAGNOSIS — Z98.84 STATUS POST BARIATRIC SURGERY: ICD-10-CM

## 2023-03-15 DIAGNOSIS — Z13.0 SCREENING, IRON DEFICIENCY ANEMIA: ICD-10-CM

## 2023-03-15 DIAGNOSIS — K91.2 POSTGASTRECTOMY MALABSORPTION: ICD-10-CM

## 2023-03-15 DIAGNOSIS — E55.9 HYPOVITAMINOSIS D: ICD-10-CM

## 2023-03-15 DIAGNOSIS — R53.83 FATIGUE, UNSPECIFIED TYPE: ICD-10-CM

## 2023-03-15 DIAGNOSIS — E66.01 OBESITY, CLASS III, BMI 40-49.9 (MORBID OBESITY): Primary | ICD-10-CM

## 2023-03-15 DIAGNOSIS — Z13.21 MALNUTRITION SCREEN: ICD-10-CM

## 2023-03-15 PROCEDURE — 99024 POSTOP FOLLOW-UP VISIT: CPT | Performed by: PHYSICIAN ASSISTANT

## 2023-03-15 NOTE — PROGRESS NOTES
[facility] Bariatric Surgery   [address]  [address]  Former[facility] [id number] [phone]      Patient Name:  Keyla Davila.  :  1965      Reason for Visit:  1 month postop    HPI:  Keyla Davila is a 57 y.o. female  s/p LSG/ clarence/ liver biopsy by  on 23    Doing well. Pleased with progress. Feeling good. Has occ nausea, not often and not bothersome. Has progressed diet without issue. Yesterday had salmon, baked potato, mushroom and salad at lunch.  Saw GI for liver fibrosis consult yesterday. Had labs with PCP with improved CKD. No oissues/concerns. Denies dysphagia, reflux, vomiting, abdominal pain, pulmonary issues and fevers.  Tolerating diet progression - on stage 6.  Getting 100g prot/day.  Drinking 64+ fluid oz/day.  Had been on vitamins but makes her nauseous.   On Omeprazole .  Still holding ASA , NSAIDs , Tramadol, Hormones, Diuretics , Steroids and Immunologics.  has been more active, walking.       Presurgery weight:  295 pounds. Today's weight is 123 kg (271 lb) pounds, today's Body mass index is 47.25 kg/m²., and@ weight loss since surgery is 24 pounds.       Past Medical History:   Diagnosis Date   • Acute deep vein thrombosis (DVT) of distal vein of right lower extremity (HCC) 2017    DVT x 1; has had 2 superficial vein clots since. On Xarelto   • Anxiety and depression     currently sees psychiatrist and counselor   • Arthritis    • Back problem    • Candidal intertrigo    • Chronic low back pain     DDD; takes gabapentin; PRN Tylenol.   • Chronic venous stasis dermatitis     Right   • CKD (chronic kidney disease), stage III (HCC)    • Diabetes mellitus (HCC)    • Dyspepsia    • Elevated hemoglobin A1c    • Elevated transaminase level    • Endometriosis     s/p partial hysterectomy   • Fatty liver    • HLD (hyperlipidemia)    • Hx of viral pneumonia    • Hypertension    • Kidney infection     bilateral; resulted in sepsis and admission x 2  weeks   • Palpitations     Sees Dr. Alvares   • PCOS (polycystic ovarian syndrome)     s/p R oopherectomy   • PONV (postoperative nausea and vomiting)    • Rectus diastasis      Past Surgical History:   Procedure Laterality Date   • BARIATRIC SURGERY     • BROW LIFT Bilateral 2022   •  SECTION  1987, ; lower transverse incision   • CHOLECYSTECTOMY N/A 2023    Procedure: CHOLECYSTECTOMY LAPAROSCOPIC;  Surgeon: Bhupendra Vee MD;  Location:  [facility];  Service: General;  Laterality: N/A;   • COLONOSCOPY     • DIAGNOSTIC LAPAROSCOPY      x5; last one in ; due to PCOS and endometriosis   • ENDOSCOPY N/A 2023    Procedure: ESOPHAGOGASTRODUODENOSCOPY;  Surgeon: Bhupendra Vee MD;  Location:  [facility];  Service: Bariatric;  Laterality: N/A;  Scope ID: [id number]   • FACIAL COSMETIC SURGERY     • GASTRECTOMY N/A 2023    Procedure: GASTRECTOMY LAPAROSCOPIC;  Surgeon: Bhupendra Vee MD;  Location:  [facility];  Service: Bariatric;  Laterality: N/A;   • HAND SURGERY Left    • LAPAROSCOPIC HYSTERECTOMY      partial; R oopherectomy- still has L ovary   • LIVER BIOPSY N/A 2023    Procedure: LIVER BIOPSY LAPAROSCOPIC;  Surgeon: Bhupendra Vee MD;  Location:  [facility];  Service: Bariatric;  Laterality: N/A;   • TEETH EXTRACTION      dental implants   • TONSILLECTOMY     • UPPER GASTROINTESTINAL ENDOSCOPY       Outpatient Medications Marked as Taking for the 3/15/23 encounter (Office Visit) with Dory Yancey PA-C   Medication Sig Dispense Refill   • ALPRAZolam (XANAX) 0.5 MG tablet Take 1 tablet by mouth 3 (Three) Times a Day.     • bisoprolol (ZEBeta) 10 MG tablet Take 1 tablet by mouth Daily.     • Cholecalciferol (Vitamin D3) 50 MCG (2000 UT) tablet Take 1 tablet by mouth Daily.     • doxepin (SINEquan) 50 MG capsule Take 1 capsule by mouth Daily.     • Dulaglutide (Trulicity) 0.75 MG/0.5ML solution pen-injector Inject 0.75 mg under the skin  "into the appropriate area as directed Every 7 (Seven) Days. On Mondays     • gabapentin (NEURONTIN) 300 MG capsule Take 1 capsule by mouth 3 (Three) Times a Day. 90 capsule 0   • hydrOXYzine pamoate (VISTARIL) 50 MG capsule Take 1 capsule by mouth Daily.     • omeprazole (priLOSEC) 40 MG capsule Take 1 capsule by mouth Daily for 60 days. 60 capsule 0   • ondansetron (Zofran) 4 MG tablet Take 1 tablet by mouth Every 4 (Four) Hours As Needed for Nausea. 8 tablet 0   • potassium chloride (K-DUR,KLOR-CON) 20 MEQ CR tablet Take 1 tablet by mouth Daily.     • Vortioxetine HBr (TRINTELLIX) 10 MG tablet tablet Take 1 tablet by mouth Daily.     • Vraylar 1.5 MG capsule capsule Take 1 capsule by mouth Daily.     • Xarelto 20 MG tablet Take 1 tablet by mouth Daily.       No Known Allergies    Social History     Socioeconomic History   • Marital status:      Spouse name: Anand   • Number of children: 2   • Highest education level: Master's degree (e.g., MA, MS, Pina, MEd, MSW, STEPHANIE)   Tobacco Use   • Smoking status: Never   • Smokeless tobacco: Never   Vaping Use   • Vaping Use: Never used   Substance and Sexual Activity   • Alcohol use: Not Currently     Comment: Occ   • Drug use: Never   • Sexual activity: Defer       /80 (BP Location: Left arm, Patient Position: Sitting)   Pulse 82   Temp 97.5 °F (36.4 °C)   Resp 18   Ht 161.3 cm (63.5\")   Wt 123 kg (271 lb)   SpO2 97%   BMI 47.25 kg/m²     Physical Exam  Constitutional:       Appearance: She is well-developed. She is obese.   HENT:      Head: Normocephalic and atraumatic.   Cardiovascular:      Rate and Rhythm: Normal rate and regular rhythm.   Pulmonary:      Effort: Pulmonary effort is normal.      Breath sounds: Normal breath sounds.   Abdominal:      General: Bowel sounds are normal.      Palpations: Abdomen is soft.      Comments: Incisions healing well   Skin:     General: Skin is warm and dry.   Neurological:      Mental Status: She is alert. "   Psychiatric:         Mood and Affect: Mood normal.         Behavior: Behavior normal.         Thought Content: Thought content normal.         Judgment: Judgment normal.           Assessment:  1 month s/p LSG/ clarence/ liver biopsy by  on 2/16/23    ICD-10-CM ICD-9-CM   1. Obesity, Class III, BMI 40-49.9 (morbid obesity) (HCC)  E66.01 278.01   2. Fatigue, unspecified type  R53.83 780.79   3. Status post bariatric surgery  Z98.84 V45.86   4. Hypovitaminosis D  E55.9 268.9   5. Screening, iron deficiency anemia  Z13.0 V78.0   6. Malnutrition screen  Z13.21 V77.2   7. Postgastrectomy malabsorption  K91.2 579.3    Z90.3          Plan:  Doing well.  Continue to advance diet per manual.  Continue protein 70-100g/day.  Encouraged good food choices - high protein, low carb.  Continue fluids 64+oz per day. Continue routine exercise, lifting restrictions lifted.  Continue PPI. Continue to avoid NSAIDS, ASA, tramadol, tobacco x 6 weeks postop, steroids x 8 weeks postop.  Routine bariatric labs ordered.  Continue vitamins w/ adjustments pending lab results.  Call w/ problems/concerns.    Class 3 Severe Obesity (BMI >=40). Obesity-related health conditions include the following: as above. Obesity is improving with treatment. BMI is is above average; BMI management plan is completed. We discussed low calorie, low carb based diet program, portion control and increasing exercise.        The patient was instructed to follow up in 2 months, sooner if needed.

## 2023-03-22 LAB
25(OH)D3+25(OH)D2 SERPL-MCNC: 46.8 NG/ML (ref 30–100)
FERRITIN SERPL-MCNC: 223 NG/ML (ref 15–150)
FOLATE SERPL-MCNC: 5.9 NG/ML
IRON SERPL-MCNC: 109 UG/DL (ref 27–159)
METHYLMALONATE SERPL-SCNC: 144 NMOL/L (ref 0–378)
PREALB SERPL-MCNC: 17 MG/DL (ref 10–36)
VIT B1 BLD-SCNC: 174.6 NMOL/L (ref 66.5–200)

## 2023-04-05 ENCOUNTER — TELEPHONE (OUTPATIENT)
Dept: BARIATRICS/WEIGHT MGMT | Facility: CLINIC | Age: 58
End: 2023-04-05
Payer: COMMERCIAL

## 2023-04-05 NOTE — TELEPHONE ENCOUNTER
Pt called with post op q  Sleeve 2/16/23, 2 months post op  Pt looking at sugar content of everything, encouraged to look at total carbs and sugars. If product is 20g carbs and 20g sugar limit and/or avoid  Can have up to 100g carbs a day, typically wont hit that number though  Tortillas, potatoes, peas, corn, wholegrain crackers ok to have in 1/4-1/2 portions  Can have frozen fruit bar and honey but limit portions  Overnight oats--lots of recipes some up to 50g carbs --a lot of carbs. Look for recipes that have less carbs and greek yogurt to add protein    Must hit 100g protein a day goal first  Pt to call with  mary

## 2023-05-15 ENCOUNTER — OFFICE VISIT (OUTPATIENT)
Dept: BARIATRICS/WEIGHT MGMT | Facility: CLINIC | Age: 58
End: 2023-05-15
Payer: COMMERCIAL

## 2023-05-15 VITALS
RESPIRATION RATE: 18 BRPM | WEIGHT: 251 LBS | SYSTOLIC BLOOD PRESSURE: 104 MMHG | TEMPERATURE: 97.1 F | OXYGEN SATURATION: 99 % | HEIGHT: 64 IN | HEART RATE: 65 BPM | DIASTOLIC BLOOD PRESSURE: 68 MMHG | BODY MASS INDEX: 42.85 KG/M2

## 2023-05-15 DIAGNOSIS — Z13.0 SCREENING, IRON DEFICIENCY ANEMIA: ICD-10-CM

## 2023-05-15 DIAGNOSIS — E55.9 HYPOVITAMINOSIS D: ICD-10-CM

## 2023-05-15 DIAGNOSIS — Z13.21 MALNUTRITION SCREEN: ICD-10-CM

## 2023-05-15 DIAGNOSIS — K91.2 POSTGASTRECTOMY MALABSORPTION: ICD-10-CM

## 2023-05-15 DIAGNOSIS — R53.83 FATIGUE, UNSPECIFIED TYPE: Primary | ICD-10-CM

## 2023-05-15 DIAGNOSIS — Z90.3 POSTGASTRECTOMY MALABSORPTION: ICD-10-CM

## 2023-05-15 DIAGNOSIS — Z98.84 STATUS POST BARIATRIC SURGERY: ICD-10-CM

## 2023-05-15 PROCEDURE — 99024 POSTOP FOLLOW-UP VISIT: CPT | Performed by: PHYSICIAN ASSISTANT

## 2023-05-15 RX ORDER — OMEPRAZOLE 20 MG/1
20 CAPSULE, DELAYED RELEASE ORAL DAILY
Qty: 90 CAPSULE | Refills: 1 | Status: SHIPPED | OUTPATIENT
Start: 2023-05-15 | End: 2023-08-13

## 2023-05-15 NOTE — PROGRESS NOTES
"[facility] Bariatric Surgery  [address]  [address]  [facility] [id number] [phone]        Patient Name:  Keyla Davila.  :  1965      Reason for Visit:   3 months postop      HPI: Keyla Davila is a 58 y.o. female s/p LSG/ clarence/ liver biopsy by  on 23    Doing well. Pleased with progress. Has had nausea intermittent, feels it is related to episodes of eating too fast. Overall seems to be improving in time.  No abd pain, reflux or dysphagia. Not interested in evaluation. Down several pants sizes. Feeling good overall.  seeign GI for liver fibrosis. Had normal BP when out of BP medication for a week, back to taking, did see PCP recently has has labs pending.  No other issues/concerns. Denies dysphagia, reflux, vomiting, abdominal pain, pulmonary issues and fevers.  Getting 100+g prot/day most days, some days close to. Eating 3-4 times a day. Takes protien bar to [employer] to go. crustless pizza, protein bowls, salads with chicken, chicken with vegetables.    Drinking 64+ fluid oz/day.  1 month labs advised protein 120+g daily.  Taking MVI, probiotic, nail and hair. Not on antacid.  Exercising \"a little\", walking some.     Presurgery weight: 295 pounds.  Today's weight is 114 kg (251 lb) pounds, today's  Body mass index is 43.77 kg/m²., and@ weight loss since surgery is 44 pounds.      Past Medical History:   Diagnosis Date   • Acute deep vein thrombosis (DVT) of distal vein of right lower extremity 2017    DVT x 1; has had 2 superficial vein clots since. On Xarelto   • Anxiety and depression     currently sees psychiatrist and counselor   • Arthritis    • Back problem    • Candidal intertrigo    • Chronic low back pain     DDD; takes gabapentin; PRN Tylenol.   • Chronic venous stasis dermatitis     Right   • CKD (chronic kidney disease), stage III    • Diabetes mellitus    • Dyspepsia    • Elevated hemoglobin A1c    • Elevated transaminase level    • " Endometriosis     s/p partial hysterectomy   • Fatty liver    • HLD (hyperlipidemia)    • Hx of viral pneumonia    • Hypertension    • Kidney infection     bilateral; resulted in sepsis and admission x 2 weeks   • Palpitations     Sees Dr. Alvares   • PCOS (polycystic ovarian syndrome)     s/p R oopherectomy   • PONV (postoperative nausea and vomiting)    • Rectus diastasis      Past Surgical History:   Procedure Laterality Date   • BARIATRIC SURGERY     • BROW LIFT Bilateral 2022   •  SECTION  1987, ; lower transverse incision   • CHOLECYSTECTOMY N/A 2023    Procedure: CHOLECYSTECTOMY LAPAROSCOPIC;  Surgeon: Bhupendra Vee MD;  Location:  [facility];  Service: General;  Laterality: N/A;   • COLONOSCOPY     • DIAGNOSTIC LAPAROSCOPY      x5; last one in ; due to PCOS and endometriosis   • ENDOSCOPY N/A 2023    Procedure: ESOPHAGOGASTRODUODENOSCOPY;  Surgeon: Bhupendra Vee MD;  Location:  [facility];  Service: Bariatric;  Laterality: N/A;  Scope ID: [id number]   • FACIAL COSMETIC SURGERY     • GASTRECTOMY N/A 2023    Procedure: GASTRECTOMY LAPAROSCOPIC;  Surgeon: Bhupendra Vee MD;  Location:  [facility];  Service: Bariatric;  Laterality: N/A;   • HAND SURGERY Left    • LAPAROSCOPIC HYSTERECTOMY      partial; R oopherectomy- still has L ovary   • LIVER BIOPSY N/A 2023    Procedure: LIVER BIOPSY LAPAROSCOPIC;  Surgeon: Bhupendra Vee MD;  Location:  [facility];  Service: Bariatric;  Laterality: N/A;   • TEETH EXTRACTION      dental implants   • TONSILLECTOMY     • UPPER GASTROINTESTINAL ENDOSCOPY       Outpatient Medications Marked as Taking for the 5/15/23 encounter (Office Visit) with Dory Yancey PA-C   Medication Sig Dispense Refill   • ALPRAZolam (XANAX) 0.5 MG tablet Take 1 tablet by mouth 3 (Three) Times a Day.     • bisoprolol (ZEBeta) 10 MG tablet Take 1 tablet by mouth Daily.     • Cholecalciferol (Vitamin D3) 50 MCG (  "UT) tablet Take 1 tablet by mouth Daily.     • doxepin (SINEquan) 50 MG capsule Take 1 capsule by mouth Daily.     • Dulaglutide (Trulicity) 0.75 MG/0.5ML solution pen-injector Inject 0.75 mg under the skin into the appropriate area as directed Every 7 (Seven) Days. On Mondays     • gabapentin (NEURONTIN) 300 MG capsule Take 1 capsule by mouth 3 (Three) Times a Day. 90 capsule 0   • hydrOXYzine pamoate (VISTARIL) 50 MG capsule Take 1 capsule by mouth Daily.     • potassium chloride (K-DUR,KLOR-CON) 20 MEQ CR tablet Take 1 tablet by mouth Daily.     • Vortioxetine HBr (TRINTELLIX) 10 MG tablet tablet Take 1 tablet by mouth Daily.     • Vraylar 1.5 MG capsule capsule Take 1 capsule by mouth Daily.     • Xarelto 20 MG tablet Take 1 tablet by mouth Daily.         No Known Allergies    Social History     Socioeconomic History   • Marital status:      Spouse name: Anand   • Number of children: 2   • Highest education level: Master's degree (e.g., MA, MS, Pina, MEd, MSW, STEPHANIE)   Tobacco Use   • Smoking status: Never   • Smokeless tobacco: Never   Vaping Use   • Vaping Use: Never used   Substance and Sexual Activity   • Alcohol use: Not Currently     Comment: Occ   • Drug use: Never   • Sexual activity: Defer       /68 (BP Location: Left arm, Patient Position: Sitting)   Pulse 65   Temp 97.1 °F (36.2 °C)   Resp 18   Ht 161.3 cm (63.5\")   Wt 114 kg (251 lb)   SpO2 99%   BMI 43.77 kg/m²     Physical Exam  Constitutional:       Appearance: She is well-developed. She is obese.   HENT:      Head: Normocephalic and atraumatic.   Cardiovascular:      Rate and Rhythm: Normal rate and regular rhythm.   Pulmonary:      Effort: Pulmonary effort is normal.      Breath sounds: Normal breath sounds.   Abdominal:      General: Bowel sounds are normal.      Palpations: Abdomen is soft.      Comments: Incisions well healed   Skin:     General: Skin is warm and dry.   Neurological:      Mental Status: She is alert. "   Psychiatric:         Mood and Affect: Mood normal.         Behavior: Behavior normal.         Thought Content: Thought content normal.         Judgment: Judgment normal.           Assessment:  3 months  s/p LSG/ clarence/ liver biopsy by  on 2/16/23      ICD-10-CM ICD-9-CM   1. Fatigue, unspecified type  R53.83 780.79   2. Status post bariatric surgery  Z98.84 V45.86   3. Hypovitaminosis D  E55.9 268.9   4. Screening, iron deficiency anemia  Z13.0 V78.0   5. Malnutrition screen  Z13.21 V77.2   6. Postgastrectomy malabsorption  K91.2 579.3    Z90.3          Plan:  Doing well. Will start omeprazole 20mg daily and start labs for nausea. Advised being mindful of eating slow, small portions. Offered eval with UGI, declined for now.  Continue w/ good food choices and healthy habits.  Continue protein 100+g/day. Welcome to f/u with dietician. Cont care with GI. Discuss BP and medication management with PCP.   Continue fluids 64+oz daily. Encouraged routine exercise.  Routine bariatric labs ordered.  Continue vitamins w/ adjustments pending lab results.  Call w/ problems/concerns.     Class 3 Severe Obesity (BMI >=40). Obesity-related health conditions include the following: as sabove. Obesity is improving with treatment. BMI is is above average; BMI management plan is completed. We discussed low calorie, low carb based diet program, portion control and increasing exercise.        The patient was instructed to follow up in 3 months, sooner if needed.

## 2023-05-23 LAB
25(OH)D3+25(OH)D2 SERPL-MCNC: 40.3 NG/ML (ref 30–100)
ALBUMIN SERPL-MCNC: 4.6 G/DL (ref 3.8–4.9)
ALBUMIN/GLOB SERPL: 1.8 {RATIO} (ref 1.2–2.2)
ALP SERPL-CCNC: 110 IU/L (ref 44–121)
ALT SERPL-CCNC: 47 IU/L (ref 0–32)
AST SERPL-CCNC: 40 IU/L (ref 0–40)
BASOPHILS # BLD AUTO: 0 X10E3/UL (ref 0–0.2)
BASOPHILS NFR BLD AUTO: 1 %
BILIRUB SERPL-MCNC: 1.1 MG/DL (ref 0–1.2)
BUN SERPL-MCNC: 16 MG/DL (ref 6–24)
BUN/CREAT SERPL: 12 (ref 9–23)
CALCIUM SERPL-MCNC: 9.9 MG/DL (ref 8.7–10.2)
CHLORIDE SERPL-SCNC: 105 MMOL/L (ref 96–106)
CO2 SERPL-SCNC: 23 MMOL/L (ref 20–29)
CREAT SERPL-MCNC: 1.29 MG/DL (ref 0.57–1)
EGFRCR SERPLBLD CKD-EPI 2021: 48 ML/MIN/1.73
EOSINOPHIL # BLD AUTO: 0.1 X10E3/UL (ref 0–0.4)
EOSINOPHIL NFR BLD AUTO: 1 %
ERYTHROCYTE [DISTWIDTH] IN BLOOD BY AUTOMATED COUNT: 13.7 % (ref 11.7–15.4)
FERRITIN SERPL-MCNC: 155 NG/ML (ref 15–150)
FOLATE SERPL-MCNC: 3.4 NG/ML
GLOBULIN SER CALC-MCNC: 2.6 G/DL (ref 1.5–4.5)
GLUCOSE SERPL-MCNC: 89 MG/DL (ref 70–99)
HCT VFR BLD AUTO: 52.5 % (ref 34–46.6)
HGB BLD-MCNC: 16.8 G/DL (ref 11.1–15.9)
IMM GRANULOCYTES # BLD AUTO: 0 X10E3/UL (ref 0–0.1)
IMM GRANULOCYTES NFR BLD AUTO: 0 %
IRON SERPL-MCNC: 98 UG/DL (ref 27–159)
LYMPHOCYTES # BLD AUTO: 2.1 X10E3/UL (ref 0.7–3.1)
LYMPHOCYTES NFR BLD AUTO: 34 %
MCH RBC QN AUTO: 28 PG (ref 26.6–33)
MCHC RBC AUTO-ENTMCNC: 32 G/DL (ref 31.5–35.7)
MCV RBC AUTO: 87 FL (ref 79–97)
METHYLMALONATE SERPL-SCNC: 103 NMOL/L (ref 0–378)
MONOCYTES # BLD AUTO: 0.4 X10E3/UL (ref 0.1–0.9)
MONOCYTES NFR BLD AUTO: 7 %
NEUTROPHILS # BLD AUTO: 3.6 X10E3/UL (ref 1.4–7)
NEUTROPHILS NFR BLD AUTO: 57 %
PLATELET # BLD AUTO: 215 X10E3/UL (ref 150–450)
POTASSIUM SERPL-SCNC: 4.8 MMOL/L (ref 3.5–5.2)
PREALB SERPL-MCNC: 19 MG/DL (ref 10–36)
PROT SERPL-MCNC: 7.2 G/DL (ref 6–8.5)
RBC # BLD AUTO: 6.01 X10E6/UL (ref 3.77–5.28)
SODIUM SERPL-SCNC: 144 MMOL/L (ref 134–144)
VIT B1 BLD-SCNC: 112.8 NMOL/L (ref 66.5–200)
WBC # BLD AUTO: 6.2 X10E3/UL (ref 3.4–10.8)

## 2023-08-15 ENCOUNTER — OFFICE VISIT (OUTPATIENT)
Dept: BARIATRICS/WEIGHT MGMT | Facility: CLINIC | Age: 58
End: 2023-08-15
Payer: COMMERCIAL

## 2023-08-15 VITALS
BODY MASS INDEX: 38.93 KG/M2 | HEART RATE: 90 BPM | SYSTOLIC BLOOD PRESSURE: 120 MMHG | WEIGHT: 228 LBS | OXYGEN SATURATION: 97 % | HEIGHT: 64 IN | TEMPERATURE: 97.3 F | DIASTOLIC BLOOD PRESSURE: 72 MMHG

## 2023-08-15 DIAGNOSIS — Z90.3 POSTGASTRECTOMY MALABSORPTION: ICD-10-CM

## 2023-08-15 DIAGNOSIS — Z13.0 SCREENING, IRON DEFICIENCY ANEMIA: ICD-10-CM

## 2023-08-15 DIAGNOSIS — R53.83 FATIGUE, UNSPECIFIED TYPE: Primary | ICD-10-CM

## 2023-08-15 DIAGNOSIS — K91.2 POSTGASTRECTOMY MALABSORPTION: ICD-10-CM

## 2023-08-15 DIAGNOSIS — E55.9 HYPOVITAMINOSIS D: ICD-10-CM

## 2023-08-15 DIAGNOSIS — E66.9 OBESITY, CLASS II, BMI 35-39.9: ICD-10-CM

## 2023-08-15 PROCEDURE — 99213 OFFICE O/P EST LOW 20 MIN: CPT | Performed by: PHYSICIAN ASSISTANT

## 2023-08-15 NOTE — PROGRESS NOTES
[facility] Bariatric Surgery  [address]  [address]  [facility] [id number] [phone]        Patient Name:  Keyla Davila  :  1965      Date of Visit: 08/15/2023      Reason for Visit:   6 months postop      HPI: Keyla Davila is a 58 y.o. female s/p LSG/ clarence/ liver biopsy by  on 23     Doing ok from surgical standpoint-happy she did it. She is grieving-her son, a  in [facility], was killed on duty 2 months ago. Notes some days she does not have much of an appetite. She is working with a grief counselor..  Denies dysphagia, reflux, nausea, vomiting, abdominal pain, hair loss, memory loss, vision changes, and numbness/tingling.  Getting 13992p prot/day most days.  Drinking 64 fluid oz/day.  3 month labs revealed low folate. Advised to discuss CKD and elevated hgb with PCP.  Taking MVI, Vit D, and hair, skin, nail and probiotic .  Not on any GI medications. Physical activity: walking while her grandchildren are at baseball practice.     Presurgery weight: 295 pounds.  Today's weight is 103 kg (228 lb) pounds, today's  Body mass index is 39.75 kg/mý., and weight loss since surgery is 67 pounds.      Past Medical History:   Diagnosis Date    Acute deep vein thrombosis (DVT) of distal vein of right lower extremity 2017    DVT x 1; has had 2 superficial vein clots since. On Xarelto    Anxiety and depression     currently sees psychiatrist and counselor    Arthritis     Back problem     Candidal intertrigo     Chronic low back pain     DDD; takes gabapentin; PRN Tylenol.    Chronic venous stasis dermatitis     Right    CKD (chronic kidney disease), stage III     Diabetes mellitus     Dyspepsia     Elevated hemoglobin A1c     Elevated transaminase level     Endometriosis     s/p partial hysterectomy    Fatty liver     HLD (hyperlipidemia)     Hx of viral pneumonia     Hypertension     Kidney infection     bilateral; resulted in sepsis and  admission x 2 weeks    Palpitations     Sees Dr. Alvares    PCOS (polycystic ovarian syndrome)     s/p R oopherectomy    PONV (postoperative nausea and vomiting)     Rectus diastasis      Past Surgical History:   Procedure Laterality Date    BARIATRIC SURGERY      BROW LIFT Bilateral 2022     SECTION  1987, ; lower transverse incision    CHOLECYSTECTOMY N/A 2023    Procedure: CHOLECYSTECTOMY LAPAROSCOPIC;  Surgeon: Bhupendar Vee MD;  Location:  [facility];  Service: General;  Laterality: N/A;    COLONOSCOPY      DIAGNOSTIC LAPAROSCOPY      x5; last one in ; due to PCOS and endometriosis    ENDOSCOPY N/A 2023    Procedure: ESOPHAGOGASTRODUODENOSCOPY;  Surgeon: Bhupendra Vee MD;  Location:  [facility];  Service: Bariatric;  Laterality: N/A;  Scope ID: [id number]    FACIAL COSMETIC SURGERY      GASTRECTOMY N/A 2023    Procedure: GASTRECTOMY LAPAROSCOPIC;  Surgeon: Bhupendra Vee MD;  Location:  [facility];  Service: Bariatric;  Laterality: N/A;    HAND SURGERY Left 1983    LAPAROSCOPIC HYSTERECTOMY      partial; R oopherectomy- still has L ovary    LIVER BIOPSY N/A 2023    Procedure: LIVER BIOPSY LAPAROSCOPIC;  Surgeon: Bhupendra Vee MD;  Location:  [facility];  Service: Bariatric;  Laterality: N/A;    TEETH EXTRACTION      dental implants    TONSILLECTOMY  1966    UPPER GASTROINTESTINAL ENDOSCOPY       Outpatient Medications Marked as Taking for the 8/15/23 encounter (Office Visit) with Nneka Lackey PA   Medication Sig Dispense Refill    ALPRAZolam (XANAX) 0.5 MG tablet Take 1 tablet by mouth 3 (Three) Times a Day.      bisoprolol (ZEBeta) 10 MG tablet Take 1 tablet by mouth Daily.      Cholecalciferol (Vitamin D3) 50 MCG ( UT) tablet Take 1 tablet by mouth Daily.      doxepin (SINEquan) 50 MG capsule Take 1 capsule by mouth Daily.      Dulaglutide (Trulicity) 0.75 MG/0.5ML solution pen-injector Inject 0.75 mg under the skin into the  "appropriate area as directed Every 7 (Seven) Days. On Mondays      gabapentin (NEURONTIN) 300 MG capsule Take 1 capsule by mouth 3 (Three) Times a Day. 90 capsule 0    hydrOXYzine pamoate (VISTARIL) 50 MG capsule Take 1 capsule by mouth Daily.      potassium chloride (K-DUR,KLOR-CON) 20 MEQ CR tablet Take 1 tablet by mouth Daily.      Vortioxetine HBr (TRINTELLIX) 10 MG tablet tablet Take 1 tablet by mouth Daily.      Xarelto 20 MG tablet Take 1 tablet by mouth Daily.         No Known Allergies    Social History     Socioeconomic History    Marital status:      Spouse name: Anand    Number of children: 2    Highest education level: Master's degree (e.g., MA, MS, Pina, MEd, MSW, STEPHANIE)   Tobacco Use    Smoking status: Never    Smokeless tobacco: Never   Vaping Use    Vaping Use: Never used   Substance and Sexual Activity    Alcohol use: Not Currently     Comment: Occ    Drug use: Never    Sexual activity: Defer     Social History     Social History Narrative     w/ 2 children; Also raising two young grandchildren. 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        /72 (BP Location: Right arm, Patient Position: Sitting)   Pulse 90   Temp 97.3 øF (36.3 øC)   Ht 161.3 cm (63.5\")   Wt 103 kg (228 lb)   SpO2 97%   BMI 39.75 kg/mý     Physical Exam  Constitutional:       Appearance: She is obese.   HENT:      Head: Normocephalic and atraumatic.   Cardiovascular:      Rate and Rhythm: Normal rate and regular rhythm.   Pulmonary:      Effort: Pulmonary effort is normal.      Breath sounds: Normal breath sounds.   Abdominal:      General: Bowel sounds are normal. There is no distension.      Palpations: Abdomen is soft. There is no mass.      Tenderness: There is no abdominal tenderness.   Skin:     General: Skin is warm and dry.   Neurological:      General: No focal deficit present.      Mental Status: She is alert and oriented to person, place, and time.   Psychiatric:         Mood and Affect: Mood " normal.         Behavior: Behavior normal.         Assessment:  6 months s/p LSG/ clarence/ liver biopsy by  on 2/16/23     ICD-10-CM ICD-9-CM   1. Fatigue, unspecified type  R53.83 780.79   2. Postgastrectomy malabsorption  K91.2 579.3    Z90.3    3. Hypovitaminosis D  E55.9 268.9   4. Screening, iron deficiency anemia  Z13.0 V78.0   5. Obesity, Class II, BMI 35-39.9  E66.9 278.00        Class 2 Severe Obesity (BMI >=35 and <=39.9). Obesity-related health conditions include the following:  as above . Obesity is improving with treatment. BMI is is above average; BMI management plan is completed. We discussed low calorie, low carb based diet program, portion control, and increasing exercise.        Plan:  Doing ok. Continue w/ good food choices and healthy habits.  Continue protein >70g/day.  Continue routine physical activity.  Routine bariatric labs ordered.  Continue vitamins w/ adjustments pending lab results.  Call w/ problems/concerns.     The patient was instructed to follow up in 3 months, sooner if needed.

## 2023-08-23 LAB
25(OH)D3+25(OH)D2 SERPL-MCNC: 32.8 NG/ML (ref 30–100)
ALBUMIN SERPL-MCNC: 4.3 G/DL (ref 3.8–4.9)
ALBUMIN/GLOB SERPL: 2 {RATIO} (ref 1.2–2.2)
ALP SERPL-CCNC: 104 IU/L (ref 44–121)
ALT SERPL-CCNC: 28 IU/L (ref 0–32)
AST SERPL-CCNC: 29 IU/L (ref 0–40)
BASOPHILS # BLD AUTO: 0 X10E3/UL (ref 0–0.2)
BASOPHILS NFR BLD AUTO: 0 %
BILIRUB SERPL-MCNC: 0.8 MG/DL (ref 0–1.2)
BUN SERPL-MCNC: 16 MG/DL (ref 6–24)
BUN/CREAT SERPL: 15 (ref 9–23)
CALCIUM SERPL-MCNC: 9.3 MG/DL (ref 8.7–10.2)
CHLORIDE SERPL-SCNC: 102 MMOL/L (ref 96–106)
CO2 SERPL-SCNC: 24 MMOL/L (ref 20–29)
CREAT SERPL-MCNC: 1.08 MG/DL (ref 0.57–1)
EGFRCR SERPLBLD CKD-EPI 2021: 59 ML/MIN/1.73
EOSINOPHIL # BLD AUTO: 0.1 X10E3/UL (ref 0–0.4)
EOSINOPHIL NFR BLD AUTO: 1 %
ERYTHROCYTE [DISTWIDTH] IN BLOOD BY AUTOMATED COUNT: 14.4 % (ref 11.7–15.4)
FERRITIN SERPL-MCNC: 162 NG/ML (ref 15–150)
FOLATE SERPL-MCNC: 5.7 NG/ML
GLOBULIN SER CALC-MCNC: 2.2 G/DL (ref 1.5–4.5)
GLUCOSE SERPL-MCNC: 79 MG/DL (ref 70–99)
HCT VFR BLD AUTO: 44.1 % (ref 34–46.6)
HGB BLD-MCNC: 14.8 G/DL (ref 11.1–15.9)
IMM GRANULOCYTES # BLD AUTO: 0 X10E3/UL (ref 0–0.1)
IMM GRANULOCYTES NFR BLD AUTO: 0 %
IRON SERPL-MCNC: 90 UG/DL (ref 27–159)
LYMPHOCYTES # BLD AUTO: 2.2 X10E3/UL (ref 0.7–3.1)
LYMPHOCYTES NFR BLD AUTO: 47 %
MCH RBC QN AUTO: 29.2 PG (ref 26.6–33)
MCHC RBC AUTO-ENTMCNC: 33.6 G/DL (ref 31.5–35.7)
MCV RBC AUTO: 87 FL (ref 79–97)
METHYLMALONATE SERPL-SCNC: 167 NMOL/L (ref 0–378)
MONOCYTES # BLD AUTO: 0.3 X10E3/UL (ref 0.1–0.9)
MONOCYTES NFR BLD AUTO: 7 %
NEUTROPHILS # BLD AUTO: 2.2 X10E3/UL (ref 1.4–7)
NEUTROPHILS NFR BLD AUTO: 45 %
PLATELET # BLD AUTO: 204 X10E3/UL (ref 150–450)
POTASSIUM SERPL-SCNC: 4.1 MMOL/L (ref 3.5–5.2)
PREALB SERPL-MCNC: 17 MG/DL (ref 10–36)
PROT SERPL-MCNC: 6.5 G/DL (ref 6–8.5)
RBC # BLD AUTO: 5.07 X10E6/UL (ref 3.77–5.28)
SODIUM SERPL-SCNC: 141 MMOL/L (ref 134–144)
VIT B1 BLD-SCNC: 111.3 NMOL/L (ref 66.5–200)
WBC # BLD AUTO: 4.8 X10E3/UL (ref 3.4–10.8)

## 2023-11-28 ENCOUNTER — TELEMEDICINE (OUTPATIENT)
Dept: BARIATRICS/WEIGHT MGMT | Facility: CLINIC | Age: 58
End: 2023-11-28
Payer: COMMERCIAL

## 2023-11-28 VITALS — WEIGHT: 208.6 LBS | BODY MASS INDEX: 35.61 KG/M2 | HEIGHT: 64 IN

## 2023-11-28 DIAGNOSIS — E66.9 OBESITY, CLASS II, BMI 35-39.9: Primary | ICD-10-CM

## 2023-11-28 PROCEDURE — 99213 OFFICE O/P EST LOW 20 MIN: CPT | Performed by: PHYSICIAN ASSISTANT

## 2023-11-28 RX ORDER — ATORVASTATIN CALCIUM 80 MG/1
80 TABLET, FILM COATED ORAL DAILY
COMMUNITY

## 2023-11-28 RX ORDER — OMEPRAZOLE 40 MG/1
40 CAPSULE, DELAYED RELEASE ORAL DAILY
COMMUNITY

## 2023-11-28 NOTE — PROGRESS NOTES
[facility] Bariatric Surgery  [address]  [address]  [facility] [address] [phone]    This visit was conducted as a video visit, in an effort to limit spread of the novel coronavirus during the COVID-19 pandemic.  The patient gave consent.      Patient Name:  Keyla Davila  :  1965      Date of Visit: 2023      Reason for Visit:   9 months postop      HPI: Keyla Davila is a 58 y.o. female s/p LSG/ clarence/ liver biopsy by  on 23      Doing well.  No issues/concerns. Denies dysphagia, reflux, nausea, vomiting, abdominal pain, hair loss, memory loss, vision changes, and numbness/tingling.  Getting 90g prot/day most days. Some days she will have to push herself to eat. Still grieving the loss of her son earlier this year. Drinking 64 fluid oz/day.  6 month labs revealed low prealbumin, CKD (PCP following). Taking MVI hair, skin, and nails.  On Omeprazole .  Physical activity: walking, cardiac rehab..    Usually has shake for breakfast.     Lunch is sometimes a shake     Dinner some kind of protein + veggie.     Sometimes will snack on cashews.      Presurgery weight: 295 pounds.  Today's weight is 94.6 kg (208 lb 9.6 oz) pounds, today's  Body mass index is 36.37 kg/m²., and weight loss since surgery is 87 pounds.      Past Medical History:   Diagnosis Date    Acute deep vein thrombosis (DVT) of distal vein of right lower extremity 2017    DVT x 1; has had 2 superficial vein clots since. On Xarelto    Anxiety and depression     currently sees psychiatrist and counselor    Arthritis     Back problem     Candidal intertrigo     Chronic low back pain     DDD; takes gabapentin; PRN Tylenol.    Chronic venous stasis dermatitis     Right    CKD (chronic kidney disease), stage III     Diabetes mellitus     Dyspepsia     Elevated hemoglobin A1c     Elevated transaminase level     Endometriosis     s/p partial hysterectomy    Fatty liver     HLD (hyperlipidemia)      Hx of viral pneumonia     Hypertension     Kidney infection     bilateral; resulted in sepsis and admission x 2 weeks    Palpitations     Sees Dr. Alvares    PCOS (polycystic ovarian syndrome)     s/p R oopherectomy    PONV (postoperative nausea and vomiting)     Rectus diastasis      Past Surgical History:   Procedure Laterality Date    BARIATRIC SURGERY      BROW LIFT Bilateral 2022     SECTION  1987, ; lower transverse incision    CHOLECYSTECTOMY N/A 2023    Procedure: CHOLECYSTECTOMY LAPAROSCOPIC;  Surgeon: Bhupendra Vee MD;  Location:  [facility];  Service: General;  Laterality: N/A;    COLONOSCOPY      DIAGNOSTIC LAPAROSCOPY      x5; last one in ; due to PCOS and endometriosis    ENDOSCOPY N/A 2023    Procedure: ESOPHAGOGASTRODUODENOSCOPY;  Surgeon: Bhupendra Vee MD;  Location:  [facility];  Service: Bariatric;  Laterality: N/A;  Scope ID: [id number]    FACIAL COSMETIC SURGERY      GASTRECTOMY N/A 2023    Procedure: GASTRECTOMY LAPAROSCOPIC;  Surgeon: Bhupendra Vee MD;  Location:  [facility];  Service: Bariatric;  Laterality: N/A;    HAND SURGERY Left     LAPAROSCOPIC HYSTERECTOMY      partial; R oopherectomy- still has L ovary    LIVER BIOPSY N/A 2023    Procedure: LIVER BIOPSY LAPAROSCOPIC;  Surgeon: Bhupendra Vee MD;  Location:  [facility];  Service: Bariatric;  Laterality: N/A;    TEETH EXTRACTION      dental implants    TONSILLECTOMY  1966    UPPER GASTROINTESTINAL ENDOSCOPY       Outpatient Medications Marked as Taking for the 23 encounter (Telemedicine) with Nneka Lackey PA   Medication Sig Dispense Refill    ALPRAZolam (XANAX) 0.5 MG tablet Take 1 tablet by mouth 3 (Three) Times a Day.      atorvastatin (LIPITOR) 80 MG tablet Take 1 tablet by mouth Daily.      Cholecalciferol (Vitamin D3) 50 MCG (2000 UT) tablet Take 1 tablet by mouth Daily.      doxepin (SINEquan) 50 MG capsule Take 1 capsule by mouth Daily.    "   Dulaglutide (Trulicity) 0.75 MG/0.5ML solution pen-injector Inject 0.75 mg under the skin into the appropriate area as directed Every 7 (Seven) Days. On Mondays      gabapentin (NEURONTIN) 300 MG capsule Take 1 capsule by mouth 3 (Three) Times a Day. 90 capsule 0    hydrOXYzine pamoate (VISTARIL) 50 MG capsule Take 1 capsule by mouth Daily.      omeprazole (priLOSEC) 40 MG capsule Take 1 capsule by mouth Daily.      potassium chloride (K-DUR,KLOR-CON) 20 MEQ CR tablet Take 1 tablet by mouth Daily.      Vortioxetine HBr (TRINTELLIX) 10 MG tablet tablet Take 1 tablet by mouth Daily.         No Known Allergies    Social History     Socioeconomic History    Marital status:      Spouse name: Anand    Number of children: 2    Highest education level: Master's degree (e.g., MA, MS, Pina, MEd, MSW, STEPHANIE)   Tobacco Use    Smoking status: Never    Smokeless tobacco: Never   Vaping Use    Vaping Use: Never used   Substance and Sexual Activity    Alcohol use: Not Currently     Comment: Occ    Drug use: Never    Sexual activity: Defer     Social History     Social History Narrative     w/ 2 children; Also raising two young grandchildren. 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        Ht 161.3 cm (63.5\")   Wt 94.6 kg (208 lb 9.6 oz)   BMI 36.37 kg/m²     Physical Exam  Constitutional:       General: She is not in acute distress.  Pulmonary:      Effort: Pulmonary effort is normal.   Neurological:      Mental Status: She is alert and oriented to person, place, and time.   Psychiatric:         Mood and Affect: Mood normal.         Behavior: Behavior normal.         Thought Content: Thought content normal.         Judgment: Judgment normal.           Assessment:  9 months s/p LSG/ clarence/ liver biopsy by  on 2/16/23      ICD-10-CM ICD-9-CM   1. Obesity, Class II, BMI 35-39.9  E66.9 278.00          Class 2 Severe Obesity (BMI >=35 and <=39.9). Obesity-related health conditions include the following:  as above " . Obesity is improving with treatment. BMI is is above average; BMI management plan is completed. We discussed low calorie, low carb based diet program, portion control, and increasing exercise.      Plan:  Doing well. Continue w/ good food choices and healthy habits. Discussed reducing reliance on shakes. Continue protein >70g/day.  Continue routine physical activity.  Routine bariatric labs deferred.  Continue vitamins w/ adjustments pending lab results.  Call w/ problems/concerns.     The patient was instructed to follow up in 3 months, sooner if needed.

## 2023-11-29 ENCOUNTER — OFFICE VISIT (OUTPATIENT)
Dept: GASTROENTEROLOGY | Facility: CLINIC | Age: 58
End: 2023-11-29
Payer: COMMERCIAL

## 2023-11-29 VITALS
WEIGHT: 206.6 LBS | HEIGHT: 64 IN | BODY MASS INDEX: 35.27 KG/M2 | DIASTOLIC BLOOD PRESSURE: 89 MMHG | TEMPERATURE: 97.5 F | SYSTOLIC BLOOD PRESSURE: 144 MMHG | HEART RATE: 65 BPM

## 2023-11-29 DIAGNOSIS — K76.0 HEPATIC STEATOSIS: Primary | ICD-10-CM

## 2023-11-29 RX ORDER — PANTOPRAZOLE SODIUM 40 MG/1
40 TABLET, DELAYED RELEASE ORAL
COMMUNITY
Start: 2023-08-24

## 2023-11-29 RX ORDER — METOPROLOL SUCCINATE 25 MG/1
25 TABLET, EXTENDED RELEASE ORAL
COMMUNITY
Start: 2023-08-24

## 2023-11-29 RX ORDER — OMEGA-3/DHA/EPA/FISH OIL 300-1000MG
CAPSULE ORAL
COMMUNITY
Start: 2023-02-20

## 2023-11-29 RX ORDER — CLOPIDOGREL BISULFATE 75 MG/1
75 TABLET ORAL
COMMUNITY
Start: 2023-08-23

## 2023-11-29 RX ORDER — ISOSORBIDE MONONITRATE 60 MG/1
60 TABLET, EXTENDED RELEASE ORAL
COMMUNITY
Start: 2023-08-31

## 2023-11-29 RX ORDER — LISINOPRIL 2.5 MG/1
2.5 TABLET ORAL
COMMUNITY
Start: 2023-08-24

## 2023-11-29 RX ORDER — MULTIPLE VITAMINS W/ MINERALS TAB 9MG-400MCG
TAB ORAL
COMMUNITY
Start: 2023-02-20

## 2023-11-29 RX ORDER — MULTIPLE VITAMINS W/ MINERALS TAB 9MG-400MCG
TAB ORAL
COMMUNITY

## 2023-11-29 NOTE — PROGRESS NOTES
GASTROENTEROLOGY OFFICE NOTE  Keyla Davila  [id number]  1965    CARE TEAM  Patient Care Team:  Eloy Estrada MD as PCP - General (Family Medicine)  Yomi Razo MD as Consulting Physician (Pain Medicine)  Cammie Hsieh APRN as Nurse Practitioner (Pain Medicine)    Referring Provider: Eloy Estrada MD    Chief Complaint   Patient presents with    Hepatic Disease        HISTORY OF PRESENT ILLNESS:  Patient is a 58-year-old female returning in follow-up with [facility] and F2 liver fibrosis biopsy-proven at the time of a gastric sleeve surgery in 2023.  She has lost approximately 90 pounds since her surgery.  Her liver enzymes have normalized, last available lab work results from 2023 shows AST 29/ALT 28/.  She is doing well without any gastrointestinal complaints today.    PAST MEDICAL HISTORY  Past Medical History:   Diagnosis Date    Acute deep vein thrombosis (DVT) of distal vein of right lower extremity 2017    DVT x 1; has had 2 superficial vein clots since. On Xarelto    Anxiety and depression     currently sees psychiatrist and counselor    Arthritis     Back problem     Candidal intertrigo     Chronic low back pain     DDD; takes gabapentin; PRN Tylenol.    Chronic venous stasis dermatitis     Right    CKD (chronic kidney disease), stage III     Diabetes mellitus     Dyspepsia     Elevated hemoglobin A1c     Elevated transaminase level     Endometriosis     s/p partial hysterectomy    Fatty liver     HLD (hyperlipidemia)     Hx of viral pneumonia     Hypertension     Kidney infection     bilateral; resulted in sepsis and admission x 2 weeks    Palpitations     Sees Dr. Alvares    PCOS (polycystic ovarian syndrome)     s/p R oopherectomy    PONV (postoperative nausea and vomiting)     Rectus diastasis         PAST SURGICAL HISTORY  Past Surgical History:   Procedure Laterality Date    BARIATRIC SURGERY      BROW LIFT Bilateral 2022     SECTION   1987 1987, 1989; lower transverse incision    CHOLECYSTECTOMY N/A 02/16/2023    Procedure: CHOLECYSTECTOMY LAPAROSCOPIC;  Surgeon: Bhupendra Vee MD;  Location:  [facility];  Service: General;  Laterality: N/A;    COLONOSCOPY      DIAGNOSTIC LAPAROSCOPY      x5; last one in 2006; due to PCOS and endometriosis    ENDOSCOPY N/A 02/16/2023    Procedure: ESOPHAGOGASTRODUODENOSCOPY;  Surgeon: Bhupendra Vee MD;  Location:  [facility];  Service: Bariatric;  Laterality: N/A;  Scope ID: [id number]    FACIAL COSMETIC SURGERY  2021    GASTRECTOMY N/A 02/16/2023    Procedure: GASTRECTOMY LAPAROSCOPIC;  Surgeon: Bhupendra Vee MD;  Location:  [facility];  Service: Bariatric;  Laterality: N/A;    HAND SURGERY Left 1983    LAPAROSCOPIC HYSTERECTOMY  1994    partial; R oopherectomy- still has L ovary    LIVER BIOPSY N/A 02/16/2023    Procedure: LIVER BIOPSY LAPAROSCOPIC;  Surgeon: Bhupendra Vee MD;  Location:  [facility];  Service: Bariatric;  Laterality: N/A;    TEETH EXTRACTION      dental implants    TONSILLECTOMY  1966    UPPER GASTROINTESTINAL ENDOSCOPY          MEDICATIONS:    Current Outpatient Medications:     ALPRAZolam (XANAX) 0.5 MG tablet, Take 1 tablet by mouth 3 (Three) Times a Day., Disp: , Rfl:     atorvastatin (LIPITOR) 80 MG tablet, Take 1 tablet by mouth Daily., Disp: , Rfl:     Cholecalciferol (Vitamin D3) 50 MCG (2000 UT) tablet, Take 1 tablet by mouth Daily., Disp: , Rfl:     clopidogrel (PLAVIX) 75 MG tablet, 1 tablet., Disp: , Rfl:     doxepin (SINEquan) 50 MG capsule, Take 1 capsule by mouth Daily., Disp: , Rfl:     Dulaglutide (Trulicity) 0.75 MG/0.5ML solution pen-injector, Inject 0.75 mg under the skin into the appropriate area as directed Every 7 (Seven) Days. On Mondays, Disp: , Rfl:     gabapentin (NEURONTIN) 300 MG capsule, Take 1 capsule by mouth 3 (Three) Times a Day., Disp: 90 capsule, Rfl: 0    hydrOXYzine pamoate (VISTARIL) 50 MG capsule, Take 1 capsule by mouth Daily., Disp: ,  Rfl:     isosorbide mononitrate (IMDUR) 60 MG 24 hr tablet, 1 tablet., Disp: , Rfl:     lisinopril (PRINIVIL,ZESTRIL) 2.5 MG tablet, 1 tablet., Disp: , Rfl:     metoprolol succinate XL (TOPROL-XL) 25 MG 24 hr tablet, 1 tablet., Disp: , Rfl:     multivitamin with minerals (Hair Skin and Nails Formula) tablet tablet, , Disp: , Rfl:     multivitamin with minerals (ONE-A-DAY 50 PLUS PO), , Disp: , Rfl:     omeprazole (priLOSEC) 40 MG capsule, Take 1 capsule by mouth Daily., Disp: , Rfl:     pantoprazole (PROTONIX) 40 MG EC tablet, 1 tablet., Disp: , Rfl:     potassium chloride (K-DUR,KLOR-CON) 20 MEQ CR tablet, Take 1 tablet by mouth Daily., Disp: , Rfl:     Probiotic Product (Daily Probiotic) capsule, , Disp: , Rfl:     Vortioxetine HBr (TRINTELLIX) 10 MG tablet tablet, Take 1 tablet by mouth Daily., Disp: , Rfl:     ALLERGIES  No Known Allergies    FAMILY HISTORY:  Family History   Problem Relation Age of Onset    Arthritis Mother     Cancer Mother     Heart disease Mother     Hypertension Mother     Liver disease Mother     Kidney disease Mother     Heart disease Father     Hypertension Father     Alcohol abuse Father     Cirrhosis Father     Rheum arthritis Sister     Clotting disorder Brother     Colon cancer Paternal Aunt     Colon cancer Paternal Uncle     Alcohol abuse Paternal Uncle     Colon cancer Maternal Grandfather     Cancer Maternal Grandfather     Hearing loss Maternal Grandfather     Hypertension Maternal Grandfather     Cancer Paternal Grandfather     Drug abuse Daughter        SOCIAL HISTORY  Social History     Socioeconomic History    Marital status:      Spouse name: Anand    Number of children: 2    Highest education level: Master's degree (e.g., MA, MS, Pina, MEd, MSW, STEPHANIE)   Tobacco Use    Smoking status: Never    Smokeless tobacco: Never   Vaping Use    Vaping Use: Never used   Substance and Sexual Activity    Alcohol use: Not Currently     Comment: Occ    Drug use: Never    Sexual  "activity: Defer         PHYSICAL EXAM   /89 (BP Location: Left arm, Patient Position: Sitting, Cuff Size: Adult)   Pulse 65   Temp 97.5 °F (36.4 °C) (Temporal)   Ht 161.3 cm (63.5\")   Wt 93.7 kg (206 lb 9.6 oz)   BMI 36.02 kg/m²   Physical Exam  Constitutional:       Appearance: Normal appearance.   HENT:      Head: Normocephalic and atraumatic.   Pulmonary:      Effort: Pulmonary effort is normal.   Neurological:      Mental Status: She is alert and oriented to person, place, and time.   Psychiatric:         Mood and Affect: Mood normal.         Thought Content: Thought content normal.           Results Review:  Component  Ref Range & Units 3 mo ago  (8/15/23) 6 mo ago  (5/15/23) 9 mo ago  (2/18/23) 9 mo ago  (2/17/23) 9 mo ago  (2/16/23) 9 mo ago  (2/9/23) 10 mo ago  (1/16/23)   Glucose  70 - 99 mg/dL 79 89 100 High  R 130 High  R 78 R, CM  123 High  R   BUN  6 - 24 mg/dL 16 16 25 High  R 16 R   12 R   Creatinine  0.57 - 1.00 mg/dL 1.08 High  1.29 High  1.29 High  1.29 High    1.25 High    EGFR Result  >59 mL/min/1.73 59 Low  48 Low         BUN/Creatinine Ratio  9 - 23 15 12 19.4 R 12.4 R   9.6 R   Sodium  134 - 144 mmol/L 141 144 136 R 139 R   143 R   Potassium  3.5 - 5.2 mmol/L 4.1 4.8 4.7 5.2  3.5 4.2 CM   Chloride  96 - 106 mmol/L 102 105 103 R 105 R   104 R   Total CO2  20 - 29 mmol/L 24 23 25.0 R 22.0 R   25.0 R   Calcium  8.7 - 10.2 mg/dL 9.3 9.9 8.7 R 8.7 R   9.7 R   Total Protein  6.0 - 8.5 g/dL 6.5 7.2 6.4 6.3   7.3   Albumin  3.8 - 4.9 g/dL 4.3 4.6 3.9 R 4.2 R   4.4 R   Globulin  1.5 - 4.5 g/dL 2.2 2.6        A/G Ratio  1.2 - 2.2 2.0 1.8 1.6 R 2.0 R   1.5 R   Total Bilirubin  0.0 - 1.2 mg/dL 0.8 1.1 0.6 0.4   0.8   Alkaline Phosphatase  44 - 121 IU/L 104 110 70 R 76 R   103 R   AST (SGOT)  0 - 40 IU/L 29 40 67 High  R 124 High  R   63 High  R   ALT (SGPT)  0 - 32 IU/L 28 47 High  68 High  R 128 High  R   69 High  R   Resulting Agency [facility]  [facility]  [facility]  [facility] "  [facility]                       ASSESSMENT / PLAN  Diagnoses and all orders for this visit:    1. Hepatic steatosis (Primary)  -     VALLE[facility] Fibrosure Plus; Future  -     Comprehensive Metabolic Panel        Return in about 1 year (around 11/29/2024).    I discussed the patients findings and my recommendations with patient    ARLENE Jaime

## 2023-12-02 LAB
A2 MACROGLOB SERPL-MCNC: 144 MG/DL (ref 110–276)
ALBUMIN SERPL-MCNC: 4.5 G/DL (ref 3.8–4.9)
ALBUMIN/GLOB SERPL: 2 {RATIO} (ref 1.2–2.2)
ALP SERPL-CCNC: 156 IU/L (ref 44–121)
ALT SERPL W P-5'-P-CCNC: 39 IU/L (ref 0–40)
ALT SERPL-CCNC: 32 IU/L (ref 0–32)
APO A-I SERPL-MCNC: 178 MG/DL (ref 116–209)
AST SERPL W P-5'-P-CCNC: 35 IU/L (ref 0–40)
AST SERPL-CCNC: 30 IU/L (ref 0–40)
BILIRUB SERPL-MCNC: 0.7 MG/DL (ref 0–1.2)
BILIRUB SERPL-MCNC: 1 MG/DL (ref 0–1.2)
BUN SERPL-MCNC: 15 MG/DL (ref 6–24)
BUN/CREAT SERPL: 12 (ref 9–23)
CALCIUM SERPL-MCNC: 9.8 MG/DL (ref 8.7–10.2)
CHLORIDE SERPL-SCNC: 104 MMOL/L (ref 96–106)
CHOLEST SERPL-MCNC: 167 MG/DL (ref 100–199)
CO2 SERPL-SCNC: 27 MMOL/L (ref 20–29)
CREAT SERPL-MCNC: 1.21 MG/DL (ref 0.57–1)
EGFRCR SERPLBLD CKD-EPI 2021: 52 ML/MIN/1.73
FIBROSIS SCORING:: ABNORMAL
FIBROSIS STAGE SERPL QL: ABNORMAL
GGT SERPL-CCNC: 19 IU/L (ref 0–60)
GLOBULIN SER CALC-MCNC: 2.3 G/DL (ref 1.5–4.5)
GLUCOSE SERPL-MCNC: 80 MG/DL (ref 70–99)
GLUCOSE SERPL-MCNC: 83 MG/DL (ref 70–99)
HAPTOGLOB SERPL-MCNC: 155 MG/DL (ref 33–346)
LABORATORY COMMENT REPORT: ABNORMAL
LIVER FIBR SCORE SERPL CALC.FIBROSURE: 0.09 (ref 0–0.21)
LIVER STEATOSIS GRADE SERPL QL: ABNORMAL
LIVER STEATOSIS SCORE SERPL: 0.4 (ref 0–0.4)
NASH GRADE SERPL QL: ABNORMAL
NASH INTERPRETATION SERPL-IMP: ABNORMAL
NASH SCORE SERPL: 0.64 (ref 0–0.25)
NASH SCORING: ABNORMAL
POTASSIUM SERPL-SCNC: 4.6 MMOL/L (ref 3.5–5.2)
PROT SERPL-MCNC: 6.8 G/DL (ref 6–8.5)
SODIUM SERPL-SCNC: 146 MMOL/L (ref 134–144)
STEATOSIS SCORING: ABNORMAL
TEST PERFORMANCE INFO SPEC: ABNORMAL
TEST PERFORMANCE INFO SPEC: ABNORMAL
TRIGL SERPL-MCNC: 122 MG/DL (ref 0–149)

## 2023-12-13 RX ORDER — OMEPRAZOLE 20 MG/1
20 CAPSULE, DELAYED RELEASE ORAL DAILY
Qty: 90 CAPSULE | Refills: 0 | OUTPATIENT
Start: 2023-12-13

## 2023-12-13 NOTE — TELEPHONE ENCOUNTER
She should discuss antacid treatment options with provider who Rx plavix as omeprazole is not indicated due to interactions.

## 2024-02-27 ENCOUNTER — OFFICE VISIT (OUTPATIENT)
Dept: BARIATRICS/WEIGHT MGMT | Facility: CLINIC | Age: 59
End: 2024-02-27
Payer: COMMERCIAL

## 2024-02-27 ENCOUNTER — DOCUMENTATION (OUTPATIENT)
Dept: BARIATRICS/WEIGHT MGMT | Facility: CLINIC | Age: 59
End: 2024-02-27
Payer: COMMERCIAL

## 2024-02-27 VITALS
DIASTOLIC BLOOD PRESSURE: 76 MMHG | SYSTOLIC BLOOD PRESSURE: 110 MMHG | OXYGEN SATURATION: 98 % | TEMPERATURE: 97.1 F | HEIGHT: 64 IN | HEART RATE: 69 BPM | BODY MASS INDEX: 33.12 KG/M2 | WEIGHT: 194 LBS

## 2024-02-27 DIAGNOSIS — R53.83 FATIGUE, UNSPECIFIED TYPE: ICD-10-CM

## 2024-02-27 DIAGNOSIS — E66.9 OBESITY, CLASS I, BMI 30-34.9: Primary | ICD-10-CM

## 2024-02-27 DIAGNOSIS — E28.2 PCOS (POLYCYSTIC OVARIAN SYNDROME): ICD-10-CM

## 2024-02-27 DIAGNOSIS — I10 HYPERTENSION, UNSPECIFIED TYPE: ICD-10-CM

## 2024-02-27 DIAGNOSIS — R79.0 ABNORMAL BLOOD LEVEL OF IRON: ICD-10-CM

## 2024-02-27 DIAGNOSIS — K76.0 HEPATIC STEATOSIS: ICD-10-CM

## 2024-02-27 DIAGNOSIS — E55.9 VITAMIN D DEFICIENCY: ICD-10-CM

## 2024-02-27 PROCEDURE — 99213 OFFICE O/P EST LOW 20 MIN: CPT | Performed by: PHYSICIAN ASSISTANT

## 2024-02-27 NOTE — PROGRESS NOTES
[facility] Bariatric Surgery  [address]  [address]  [facility] [address] [phone]        Patient Name:  Keyla Davila  :  1965      Date of Visit: 2024      Reason for Visit:   Annual Eval - 1 year postop        HPI: Keyla Davila is a 58 y.o. female s/p LSG/ clarence/ liver biopsy (steatosis, fibrosis) by  on 23     Overall she is doing well.  Getting 90-100g prot/day, but has inconsistent eating patterns.  Stays busy raising her grandchildren and is also still grieving the loss of her son.  Exercising 3 days/week - walking.     No other issues/concerns.  Denies dysphagia/reflux/N/V/AP.   Continues on Protonix.  Following w/ GI for liver steatohepatitis.     Labs 8/15/23 - reviewed.  Taking MVI + Vit D.      Presurgery weight: 295 pounds.  Today's weight is 88 kg (194 lb) pounds, today's  Body mass index is 33.83 kg/m²., and weight loss since surgery is 101 pounds.      Past Medical History:   Diagnosis Date    Acute deep vein thrombosis (DVT) of distal vein of right lower extremity 2017    DVT x 1; has had 2 superficial vein clots since. On Xarelto    Anxiety and depression     currently sees psychiatrist and counselor    Arthritis     Back problem     Candidal intertrigo     Chronic low back pain     DDD; takes gabapentin; PRN Tylenol.    Chronic venous stasis dermatitis     Right    CKD (chronic kidney disease), stage III     Diabetes mellitus     Dyspepsia     Elevated hemoglobin A1c     Elevated transaminase level     Endometriosis     s/p partial hysterectomy    Fatty liver     HLD (hyperlipidemia)     Hx of viral pneumonia     Hypertension     Kidney infection     bilateral; resulted in sepsis and admission x 2 weeks    Palpitations     Sees Dr. Alvares    PCOS (polycystic ovarian syndrome)     s/p R oopherectomy    PONV (postoperative nausea and vomiting)     Rectus diastasis      Past Surgical History:   Procedure Laterality Date     BARIATRIC SURGERY      BROW LIFT Bilateral 2022     SECTION  1987, ; lower transverse incision    CHOLECYSTECTOMY N/A 2023    Procedure: CHOLECYSTECTOMY LAPAROSCOPIC;  Surgeon: Bhupendra Vee MD;  Location:  [facility];  Service: General;  Laterality: N/A;    COLONOSCOPY      DIAGNOSTIC LAPAROSCOPY      x5; last one in ; due to PCOS and endometriosis    ENDOSCOPY N/A 2023    Procedure: ESOPHAGOGASTRODUODENOSCOPY;  Surgeon: Bhupendra Vee MD;  Location:  [facility];  Service: Bariatric;  Laterality: N/A;  Scope ID: [id number]    FACIAL COSMETIC SURGERY      GASTRECTOMY N/A 2023    Procedure: GASTRECTOMY LAPAROSCOPIC;  Surgeon: Bhupendra Vee MD;  Location:  [facility];  Service: Bariatric;  Laterality: N/A;    HAND SURGERY Left 1983    LAPAROSCOPIC HYSTERECTOMY      partial; R oopherectomy- still has L ovary    LIVER BIOPSY N/A 2023    Procedure: LIVER BIOPSY LAPAROSCOPIC;  Surgeon: Bhupendra Vee MD;  Location:  [facility];  Service: Bariatric;  Laterality: N/A;    TEETH EXTRACTION      dental implants    TONSILLECTOMY  1966    UPPER GASTROINTESTINAL ENDOSCOPY       Outpatient Medications Marked as Taking for the 24 encounter (Office Visit) with Arin Villafuerte PA   Medication Sig Dispense Refill    ALPRAZolam (XANAX) 0.5 MG tablet Take 1 tablet by mouth 3 (Three) Times a Day.      atorvastatin (LIPITOR) 80 MG tablet Take 1 tablet by mouth Daily.      Cholecalciferol (Vitamin D3) 50 MCG (2000 UT) tablet Take 1 tablet by mouth Daily.      clopidogrel (PLAVIX) 75 MG tablet 1 tablet.      doxepin (SINEquan) 50 MG capsule Take 1 capsule by mouth Daily.      Dulaglutide (Trulicity) 0.75 MG/0.5ML solution pen-injector Inject 0.75 mg under the skin into the appropriate area as directed Every 7 (Seven) Days. On       gabapentin (NEURONTIN) 300 MG capsule Take 1 capsule by mouth 3 (Three) Times a Day. 90 capsule 0    hydrOXYzine pamoate  "(VISTARIL) 50 MG capsule Take 1 capsule by mouth Daily.      isosorbide mononitrate (IMDUR) 60 MG 24 hr tablet 1 tablet.      metoprolol succinate XL (TOPROL-XL) 25 MG 24 hr tablet 1 tablet.      multivitamin with minerals (Hair Skin and Nails Formula) tablet tablet       multivitamin with minerals (ONE-A-DAY 50 PLUS PO)       pantoprazole (PROTONIX) 40 MG EC tablet 1 tablet.      potassium chloride (K-DUR,KLOR-CON) 20 MEQ CR tablet Take 1 tablet by mouth Daily.      Probiotic Product (Daily Probiotic) capsule       Vortioxetine HBr (TRINTELLIX) 10 MG tablet tablet Take 1 tablet by mouth Daily.         No Known Allergies    Social History     Socioeconomic History    Marital status:      Spouse name: Anand    Number of children: 2    Highest education level: Master's degree (e.g., MA, MS, Pina, MEd, MSW, STEPHANIE)   Tobacco Use    Smoking status: Never    Smokeless tobacco: Never   Vaping Use    Vaping status: Never Used   Substance and Sexual Activity    Alcohol use: Not Currently     Comment: Occ    Drug use: Never    Sexual activity: Defer     Social History     Social History Narrative     w/ 2 children; Also raising two young grandchildren. 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        /76 (BP Location: Right arm, Patient Position: Sitting)   Pulse 69   Temp 97.1 °F (36.2 °C)   Ht 161.3 cm (63.5\")   Wt 88 kg (194 lb)   SpO2 98%   BMI 33.83 kg/m²     Physical Exam  Constitutional:       Appearance: She is well-developed.   Cardiovascular:      Rate and Rhythm: Normal rate.   Pulmonary:      Effort: Pulmonary effort is normal.   Musculoskeletal:         General: Normal range of motion.   Neurological:      Mental Status: She is alert.   Psychiatric:         Thought Content: Thought content normal.         Judgment: Judgment normal.           Assessment:  1 year s/p LSG/ clarence/ liver biopsy (steatosis, fibrosis) by  on 2/16/23     ICD-10-CM ICD-9-CM   1. Obesity, Class I, BMI 30-34.9  " E66.9 278.00   2. Hypertension, unspecified type  I10 401.9   3. PCOS (polycystic ovarian syndrome)  E28.2 256.4   4. Hepatic steatosis  K76.0 571.8   5. Fatigue, unspecified type  R53.83 780.79   6. Abnormal blood level of iron  R79.0 790.6   7. Vitamin D deficiency  E55.9 268.9       BMI is >= 30 and <35. (Class 1 Obesity). The following options were offered after discussion;: see plan.      Plan:  Encouraged to continue w/ good food choices and healthy habits as able.  Follow up w/ dietitian today.  Routine bariatric labs ordered.  Continue current vitamin regimen w/ adjustments pending lab results.  Call w/ problems/concerns.     The patient was instructed to follow up in 6 months, sooner if needed.

## 2024-02-27 NOTE — PROGRESS NOTES
Bariatric Nutrition Consult     Name: Keyla Davila   YOB: 1965   Age: 58 y.o.   MRN: [id number]    Consult Date:  2/27/24    Surgery:         sleeve                          Date of surgery:2/16/23    Patient is 1 year  post op.     Height: 161.3cm            Weight: 194lbs      Pre Op weight: 295lbs              Current BMI: 33.83    Weight change:  lost 101lbs, pt would like to lose 24 more lbs to reach her goal.    Diet history reveals:  grieving the death of her son and raising grandchildren. Some days she does not feel like eating at all and other days she can. Focusing on high protein low carb choices. Police memorial trip to [facility] and a cruise to [place] coming up. Worried about eating during this time    Breakfast: breakfast burrito/protein drink/protein drink in coffee  Lunch: salami and cheese/ fruit and nut pack/ egg salad  Dinner: pot roast, carrots and potatoes/spaghetti/ cubed steak with gravy in the slow cooker    2/7: taco bell protein power bowl/burrito or [employer] cheeseburger- takes off half the bun. Occasionally has 1/2 sweet 1/2 unsweet tea. Rarely lemonade. Avoid HFCS at home.   Dislikes quest protein chips    Protein sources:  meat, fish, chicken,cheese, eggs, protein drinks, jerky, protein bars, cottage cheese, greek yogurt    Drinks:  cirkul-water, coffee, protein drinks, occasional 1/2 sweet 1/2 unsweet tea or rarely lemonade    Exercise/activity:  walking    Main bariatric nutrition principles discussed and explained and queries answered. Encouraged patient to focus on       Long discussion re trips coming up and how to manage eating. Portable proteins, frequency, allowing the occasional treat  If she cannot eat on some days try small amounts of pre made/portable proteins or even protein drinks. Ensure to stay hydrated  Meal ideas and snack ideas discussed and written info provided  Queries answered  Pt to call with mary Parnell RD, LD

## 2024-03-05 LAB
25(OH)D3+25(OH)D2 SERPL-MCNC: 36.8 NG/ML (ref 30–100)
ALBUMIN SERPL-MCNC: 4.4 G/DL (ref 3.8–4.9)
ALBUMIN/GLOB SERPL: 2.3 {RATIO} (ref 1.2–2.2)
ALP SERPL-CCNC: 127 IU/L (ref 44–121)
ALT SERPL-CCNC: 28 IU/L (ref 0–32)
AST SERPL-CCNC: 27 IU/L (ref 0–40)
BASOPHILS # BLD AUTO: 0 X10E3/UL (ref 0–0.2)
BASOPHILS NFR BLD AUTO: 1 %
BILIRUB SERPL-MCNC: 1 MG/DL (ref 0–1.2)
BUN SERPL-MCNC: 15 MG/DL (ref 6–24)
BUN/CREAT SERPL: 14 (ref 9–23)
CALCIUM SERPL-MCNC: 9.4 MG/DL (ref 8.7–10.2)
CHLORIDE SERPL-SCNC: 104 MMOL/L (ref 96–106)
CO2 SERPL-SCNC: 26 MMOL/L (ref 20–29)
CREAT SERPL-MCNC: 1.1 MG/DL (ref 0.57–1)
EGFRCR SERPLBLD CKD-EPI 2021: 58 ML/MIN/1.73
EOSINOPHIL # BLD AUTO: 0.1 X10E3/UL (ref 0–0.4)
EOSINOPHIL NFR BLD AUTO: 2 %
ERYTHROCYTE [DISTWIDTH] IN BLOOD BY AUTOMATED COUNT: 13.5 % (ref 11.7–15.4)
FERRITIN SERPL-MCNC: 77 NG/ML (ref 15–150)
FOLATE SERPL-MCNC: >20 NG/ML
GLOBULIN SER CALC-MCNC: 1.9 G/DL (ref 1.5–4.5)
GLUCOSE SERPL-MCNC: 81 MG/DL (ref 70–99)
HCT VFR BLD AUTO: 42.8 % (ref 34–46.6)
HGB BLD-MCNC: 13.6 G/DL (ref 11.1–15.9)
IMM GRANULOCYTES # BLD AUTO: 0 X10E3/UL (ref 0–0.1)
IMM GRANULOCYTES NFR BLD AUTO: 0 %
IRON SERPL-MCNC: 87 UG/DL (ref 27–159)
LYMPHOCYTES # BLD AUTO: 1.7 X10E3/UL (ref 0.7–3.1)
LYMPHOCYTES NFR BLD AUTO: 40 %
MCH RBC QN AUTO: 27.7 PG (ref 26.6–33)
MCHC RBC AUTO-ENTMCNC: 31.8 G/DL (ref 31.5–35.7)
MCV RBC AUTO: 87 FL (ref 79–97)
METHYLMALONATE SERPL-SCNC: 165 NMOL/L (ref 0–378)
MONOCYTES # BLD AUTO: 0.3 X10E3/UL (ref 0.1–0.9)
MONOCYTES NFR BLD AUTO: 6 %
NEUTROPHILS # BLD AUTO: 2.2 X10E3/UL (ref 1.4–7)
NEUTROPHILS NFR BLD AUTO: 51 %
PLATELET # BLD AUTO: 200 X10E3/UL (ref 150–450)
POTASSIUM SERPL-SCNC: 4.3 MMOL/L (ref 3.5–5.2)
PREALB SERPL-MCNC: 21 MG/DL (ref 10–36)
PROT SERPL-MCNC: 6.3 G/DL (ref 6–8.5)
RBC # BLD AUTO: 4.91 X10E6/UL (ref 3.77–5.28)
SODIUM SERPL-SCNC: 144 MMOL/L (ref 134–144)
VIT B1 BLD-SCNC: 210.3 NMOL/L (ref 66.5–200)
WBC # BLD AUTO: 4.2 X10E3/UL (ref 3.4–10.8)

## 2024-05-30 ENCOUNTER — TELEPHONE (OUTPATIENT)
Dept: GASTROENTEROLOGY | Facility: CLINIC | Age: 59
End: 2024-05-30
Payer: COMMERCIAL

## 2024-08-26 ENCOUNTER — OFFICE VISIT (OUTPATIENT)
Dept: BARIATRICS/WEIGHT MGMT | Facility: CLINIC | Age: 59
End: 2024-08-26
Payer: COMMERCIAL

## 2024-08-26 VITALS
WEIGHT: 192.4 LBS | HEART RATE: 68 BPM | SYSTOLIC BLOOD PRESSURE: 128 MMHG | BODY MASS INDEX: 32.85 KG/M2 | DIASTOLIC BLOOD PRESSURE: 70 MMHG | RESPIRATION RATE: 16 BRPM | TEMPERATURE: 97.1 F | HEIGHT: 64 IN | OXYGEN SATURATION: 97 %

## 2024-08-26 DIAGNOSIS — K91.2 POSTGASTRECTOMY MALABSORPTION: Primary | ICD-10-CM

## 2024-08-26 DIAGNOSIS — Z90.3 POSTGASTRECTOMY MALABSORPTION: Primary | ICD-10-CM

## 2024-08-26 DIAGNOSIS — K76.0 HEPATIC STEATOSIS: ICD-10-CM

## 2024-08-26 DIAGNOSIS — E55.9 VITAMIN D DEFICIENCY: ICD-10-CM

## 2024-08-26 DIAGNOSIS — Z98.84 S/P BARIATRIC SURGERY: ICD-10-CM

## 2024-08-26 DIAGNOSIS — I10 HYPERTENSION, UNSPECIFIED TYPE: ICD-10-CM

## 2024-08-26 DIAGNOSIS — R53.83 FATIGUE, UNSPECIFIED TYPE: ICD-10-CM

## 2024-08-26 PROCEDURE — 99213 OFFICE O/P EST LOW 20 MIN: CPT | Performed by: SURGERY

## 2024-08-26 NOTE — PROGRESS NOTES
[facility] Bariatric Surgery  [address]  [address]  [facility] [id number] [phone]        Patient Name:  Keyla Davila.  :  1965      Date of Visit: 2024      Reason for Visit:   18 months postop      HPI: Keyla Davila is a 59 y.o. female s/p  LSG/clarence/liver bx (steatohepatitis grade 2 stage II) Dr. Vee 2023    Overall she is doing well from a GI standpoint and checks no to all the boxes.  She is taking a multivitamin and vitamin D.  She is getting 90 to 100 g of protein a day and avoiding high fructose corn syrup.  She has had a lot of stress lately, her daughter passed away in  and she is raising her kids and that is one of the reason she had metabolic bariatric surgery and her son passed away this May he was the deputy killed in [place].  The patient says she has a friend who had metabolic emergency surgery about a year before she did.  She is younger and then had a hysterectomy and started gaining weight and started on Wegovy and has done great.  The patient would like to consider medical weight loss that she is hit a stall after losing over 100 pounds.  She also would like referral for evaluation of removal of her excess skin.  She is drinking and voiding well.  Her kidney disease is stable and she follows up regularly with her nephrologist.  She is exercising 4 days a week.  Labs from her last visit in February reviewed, stable mild chronic kidney disease, alkaline phosphatase elevated 127 slightly improved, increased B1 level.     Presurgery weight: 295 pounds.  Today's weight is 87.3 kg (192 lb 6.4 oz) pounds, today's  Body mass index is 33.55 kg/m²., and   weight loss since surgery is 103 pounds.      Past Medical History:   Diagnosis Date    Acute deep vein thrombosis (DVT) of distal vein of right lower extremity 2017    DVT x 1; has had 2 superficial vein clots since. On Xarelto    Anxiety and depression     currently sees psychiatrist  and counselor    Arthritis     Back problem     Candidal intertrigo     Chronic low back pain     DDD; takes gabapentin; PRN Tylenol.    Chronic venous stasis dermatitis     Right    CKD (chronic kidney disease), stage III     Diabetes mellitus     Dyspepsia     Elevated hemoglobin A1c     Elevated transaminase level     Endometriosis     s/p partial hysterectomy    Fatty liver     HLD (hyperlipidemia)     Hx of viral pneumonia     Hypertension     Kidney infection     bilateral; resulted in sepsis and admission x 2 weeks    Palpitations     Sees Dr. Alvares    PCOS (polycystic ovarian syndrome)     s/p R oopherectomy    PONV (postoperative nausea and vomiting)     Rectus diastasis      Past Surgical History:   Procedure Laterality Date    BARIATRIC SURGERY      BROW LIFT Bilateral 2022     SECTION  1987, ; lower transverse incision    CHOLECYSTECTOMY N/A 2023    Procedure: CHOLECYSTECTOMY LAPAROSCOPIC;  Surgeon: Bhupendra Vee MD;  Location:  [facility];  Service: General;  Laterality: N/A;    COLONOSCOPY      DIAGNOSTIC LAPAROSCOPY      x5; last one in ; due to PCOS and endometriosis    ENDOSCOPY N/A 2023    Procedure: ESOPHAGOGASTRODUODENOSCOPY;  Surgeon: Bhupendra Vee MD;  Location:  [facility];  Service: Bariatric;  Laterality: N/A;  Scope ID: [id number]    FACIAL COSMETIC SURGERY      GASTRECTOMY N/A 2023    Procedure: GASTRECTOMY LAPAROSCOPIC;  Surgeon: Bhupendra Vee MD;  Location:  [facility];  Service: Bariatric;  Laterality: N/A;    HAND SURGERY Left     LAPAROSCOPIC HYSTERECTOMY      partial; R oopherectomy- still has L ovary    LIVER BIOPSY N/A 2023    Procedure: LIVER BIOPSY LAPAROSCOPIC;  Surgeon: Bhupendra Vee MD;  Location:  [facility];  Service: Bariatric;  Laterality: N/A;    TEETH EXTRACTION      dental implants    TONSILLECTOMY      UPPER GASTROINTESTINAL ENDOSCOPY       Outpatient Medications Marked as Taking for  the 8/26/24 encounter (Office Visit) with Bhupendra Vee MD   Medication Sig Dispense Refill    ALPRAZolam (XANAX) 0.5 MG tablet Take 1 tablet by mouth 3 (Three) Times a Day.      atorvastatin (LIPITOR) 80 MG tablet Take 1 tablet by mouth Daily.      Cholecalciferol (Vitamin D3) 50 MCG (2000 UT) tablet Take 1 tablet by mouth Daily.      clopidogrel (PLAVIX) 75 MG tablet Take 1 tablet by mouth Daily.      doxepin (SINEquan) 50 MG capsule Take 1 capsule by mouth Daily.      Dulaglutide (Trulicity) 0.75 MG/0.5ML solution pen-injector Inject 0.75 mg under the skin into the appropriate area as directed. EVERY 10 DAYS      Evolocumab (REPATHA) solution prefilled syringe injection Inject 1 mL under the skin into the appropriate area as directed Every 14 (Fourteen) Days.      gabapentin (NEURONTIN) 300 MG capsule Take 1 capsule by mouth 3 (Three) Times a Day. 90 capsule 0    hydrOXYzine pamoate (VISTARIL) 50 MG capsule Take 1 capsule by mouth Daily.      isosorbide mononitrate (IMDUR) 60 MG 24 hr tablet Take 1 tablet by mouth Daily.      metoprolol succinate XL (TOPROL-XL) 25 MG 24 hr tablet Take 1 tablet by mouth Daily.      multivitamin with minerals (Hair Skin and Nails Formula) tablet tablet       multivitamin with minerals (ONE-A-DAY 50 PLUS PO)       potassium chloride (K-DUR,KLOR-CON) 20 MEQ CR tablet Take 1 tablet by mouth Daily.      Probiotic Product (Daily Probiotic) capsule       Vortioxetine HBr (TRINTELLIX) 10 MG tablet tablet Take 1 tablet by mouth Daily.         No Known Allergies    Social History     Socioeconomic History    Marital status:      Spouse name: Anand    Number of children: 2    Highest education level: Master's degree (e.g., MA, MS, Pina, MEd, MSW, STEPHANIE)   Tobacco Use    Smoking status: Never    Smokeless tobacco: Never   Vaping Use    Vaping status: Never Used   Substance and Sexual Activity    Alcohol use: Not Currently     Comment: Occ    Drug use: Never    Sexual activity:  "Defer       /70 (BP Location: Left arm, Patient Position: Sitting)   Pulse 68   Temp 97.1 °F (36.2 °C)   Resp 16   Ht 161.3 cm (63.5\")   Wt 87.3 kg (192 lb 6.4 oz)   SpO2 97%   BMI 33.55 kg/m²     Physical Exam      Assessment: 18 months s/p laparoscopic sleeve gastrectomy, cholecystectomy and liver biopsy for grade 2 stage II steatohepatitis with Dr. Vee on 2/16/2023.  At the time of surgery she was super morbidly obese with a BMI of 51.4 and has lost a little over 100 pounds with a current BMI of 33.6.  Overall doing well from a postsurgical standpoint.  Would like to lose additional weight and have skin removal.         Plan:    Placed referral for medical weight loss and [facility].  Continue w/ good food choices and healthy habits.  Continue to try and get 100+g/day of protein.  Continue routine exercise.  Routine bariatric labs ordered.  Continue vitamins w/ adjustments pending lab results.  Call w/ problems/concerns.     The patient was instructed to follow up in 6 months, sooner if needed.    I spent 20 minutes caring for Keyla on this date of service. This time includes time spent by me in the following activities: preparing for the visit, reviewing tests, performing a medically appropriate examination and/or evaluation, counseling and educating the patient/family/caregiver, referring and communicating with other health care professionals, documenting information in the medical record, independently interpreting results and communicating that information with the patient/family/caregiver, and ordering test(s)    Thank you Dr. Estrada for the opportunity to participate in the care of Mrs. Davila.    Bhupendra Vee MD    "

## 2024-08-26 NOTE — LETTER
2024     Eloy Estrada MD  [address]  [address]  [place] [address]    Patient: Keyla Davila   YOB: 1965   Date of Visit: 2024     Dear Eloy Estrada MD:       Thank you for referring Keyla Davila to me for evaluation. Below are the relevant portions of my assessment and plan of care.    If you have questions, please do not hesitate to call me. I look forward to following Keyla along with you.         Sincerely,        Bhupendra Vee MD        CC: No Recipients    Bhupendra Vee MD  24 1201  Sign when Signing Visit  [facility] Bariatric Surgery  [address]    [facility] [id number] [phone]        Patient Name:  Keyla Davila.  :  1965      Date of Visit: 2024      Reason for Visit:   18 months postop      HPI: Keyla Davila is a 59 y.o. female s/p  LSG/clarence/liver bx (steatohepatitis grade 2 stage II) Dr. Vee 2023    Overall she is doing well from a GI standpoint and checks no to all the boxes.  She is taking a multivitamin and vitamin D.  She is getting 90 to 100 g of protein a day and avoiding high fructose corn syrup.  She has had a lot of stress lately, her daughter passed away in  and she is raising her kids and that is one of the reason she had metabolic bariatric surgery and her son passed away this May he was the deputy killed in [place].  The patient says she has a friend who had metabolic emergency surgery about a year before she did.  She is younger and then had a hysterectomy and started gaining weight and started on Wegovy and has done great.  The patient would like to consider medical weight loss that she is hit a stall after losing over 100 pounds.  She also would like referral for evaluation of removal of her excess skin.  She is drinking and voiding well.  Her kidney disease is stable and she follows up regularly with her nephrologist.  She is  exercising 4 days a week.  Labs from her last visit in February reviewed, stable mild chronic kidney disease, alkaline phosphatase elevated 127 slightly improved, increased B1 level.     Presurgery weight: 295 pounds.  Today's weight is 87.3 kg (192 lb 6.4 oz) pounds, today's  Body mass index is 33.55 kg/m²., and   weight loss since surgery is 103 pounds.      Past Medical History:   Diagnosis Date   • Acute deep vein thrombosis (DVT) of distal vein of right lower extremity     DVT x 1; has had 2 superficial vein clots since. On Xarelto   • Anxiety and depression     currently sees psychiatrist and counselor   • Arthritis    • Back problem    • Candidal intertrigo    • Chronic low back pain     DDD; takes gabapentin; PRN Tylenol.   • Chronic venous stasis dermatitis     Right   • CKD (chronic kidney disease), stage III    • Diabetes mellitus    • Dyspepsia    • Elevated hemoglobin A1c    • Elevated transaminase level    • Endometriosis     s/p partial hysterectomy   • Fatty liver    • HLD (hyperlipidemia)    • Hx of viral pneumonia    • Hypertension    • Kidney infection     bilateral; resulted in sepsis and admission x 2 weeks   • Palpitations     Sees Dr. Alvares   • PCOS (polycystic ovarian syndrome)     s/p R oopherectomy   • PONV (postoperative nausea and vomiting)    • Rectus diastasis      Past Surgical History:   Procedure Laterality Date   • BARIATRIC SURGERY     • BROW LIFT Bilateral 2022   •  SECTION  1987, ; lower transverse incision   • CHOLECYSTECTOMY N/A 2023    Procedure: CHOLECYSTECTOMY LAPAROSCOPIC;  Surgeon: Bhupendra Vee MD;  Location: [facility];  Service: General;  Laterality: N/A;   • COLONOSCOPY     • DIAGNOSTIC LAPAROSCOPY      x5; last one in ; due to PCOS and endometriosis   • ENDOSCOPY N/A 2023    Procedure: ESOPHAGOGASTRODUODENOSCOPY;  Surgeon: Bhupendra Vee MD;  Location: [facility];  Service: Bariatric;  Laterality: N/A;   Scope ID: [id number]   • FACIAL COSMETIC SURGERY  2021   • GASTRECTOMY N/A 02/16/2023    Procedure: GASTRECTOMY LAPAROSCOPIC;  Surgeon: Bhupendra Vee MD;  Location:  [facility];  Service: Bariatric;  Laterality: N/A;   • HAND SURGERY Left 1983   • LAPAROSCOPIC HYSTERECTOMY  1994    partial; R oopherectomy- still has L ovary   • LIVER BIOPSY N/A 02/16/2023    Procedure: LIVER BIOPSY LAPAROSCOPIC;  Surgeon: Bhupendra Vee MD;  Location:  [facility];  Service: Bariatric;  Laterality: N/A;   • TEETH EXTRACTION      dental implants   • TONSILLECTOMY  1966   • UPPER GASTROINTESTINAL ENDOSCOPY       Outpatient Medications Marked as Taking for the 8/26/24 encounter (Office Visit) with Bhupendra Vee MD   Medication Sig Dispense Refill   • ALPRAZolam (XANAX) 0.5 MG tablet Take 1 tablet by mouth 3 (Three) Times a Day.     • atorvastatin (LIPITOR) 80 MG tablet Take 1 tablet by mouth Daily.     • Cholecalciferol (Vitamin D3) 50 MCG (2000 UT) tablet Take 1 tablet by mouth Daily.     • clopidogrel (PLAVIX) 75 MG tablet Take 1 tablet by mouth Daily.     • doxepin (SINEquan) 50 MG capsule Take 1 capsule by mouth Daily.     • Dulaglutide (Trulicity) 0.75 MG/0.5ML solution pen-injector Inject 0.75 mg under the skin into the appropriate area as directed. EVERY 10 DAYS     • Evolocumab (REPATHA) solution prefilled syringe injection Inject 1 mL under the skin into the appropriate area as directed Every 14 (Fourteen) Days.     • gabapentin (NEURONTIN) 300 MG capsule Take 1 capsule by mouth 3 (Three) Times a Day. 90 capsule 0   • hydrOXYzine pamoate (VISTARIL) 50 MG capsule Take 1 capsule by mouth Daily.     • isosorbide mononitrate (IMDUR) 60 MG 24 hr tablet Take 1 tablet by mouth Daily.     • metoprolol succinate XL (TOPROL-XL) 25 MG 24 hr tablet Take 1 tablet by mouth Daily.     • multivitamin with minerals (Hair Skin and Nails Formula) tablet tablet      • multivitamin with minerals (ONE-A-DAY 50 PLUS PO)      • potassium  "chloride (K-DUR,KLOR-CON) 20 MEQ CR tablet Take 1 tablet by mouth Daily.     • Probiotic Product (Daily Probiotic) capsule      • Vortioxetine HBr (TRINTELLIX) 10 MG tablet tablet Take 1 tablet by mouth Daily.         No Known Allergies    Social History     Socioeconomic History   • Marital status:      Spouse name: Anand   • Number of children: 2   • Highest education level: Master's degree (e.g., MA, MS, Pina, MEd, MSW, STEPHANIE)   Tobacco Use   • Smoking status: Never   • Smokeless tobacco: Never   Vaping Use   • Vaping status: Never Used   Substance and Sexual Activity   • Alcohol use: Not Currently     Comment: Occ   • Drug use: Never   • Sexual activity: Defer       /70 (BP Location: Left arm, Patient Position: Sitting)   Pulse 68   Temp 97.1 °F (36.2 °C)   Resp 16   Ht 161.3 cm (63.5\")   Wt 87.3 kg (192 lb 6.4 oz)   SpO2 97%   BMI 33.55 kg/m²     Physical Exam      Assessment: 18 months s/p laparoscopic sleeve gastrectomy, cholecystectomy and liver biopsy for grade 2 stage II steatohepatitis with Dr. Vee on 2/16/2023.  At the time of surgery she was super morbidly obese with a BMI of 51.4 and has lost a little over 100 pounds with a current BMI of 33.6.  Overall doing well from a postsurgical standpoint.  Would like to lose additional weight and have skin removal.         Plan:    Placed referral for medical weight loss and [facility].  Continue w/ good food choices and healthy habits.  Continue to try and get 100+g/day of protein.  Continue routine exercise.  Routine bariatric labs ordered.  Continue vitamins w/ adjustments pending lab results.  Call w/ problems/concerns.     The patient was instructed to follow up in 6 months, sooner if needed.    I spent 20 minutes caring for Keyla on this date of service. This time includes time spent by me in the following activities: preparing for the visit, reviewing tests, performing a medically appropriate examination and/or evaluation, " counseling and educating the patient/family/caregiver, referring and communicating with other health care professionals, documenting information in the medical record, independently interpreting results and communicating that information with the patient/family/caregiver, and ordering test(s)    Thank you Dr. Estrada for the opportunity to participate in the care of Mrs. Davila.    Bhupendra Vee MD

## 2024-08-29 ENCOUNTER — OFFICE VISIT (OUTPATIENT)
Dept: GASTROENTEROLOGY | Facility: CLINIC | Age: 59
End: 2024-08-29
Payer: COMMERCIAL

## 2024-08-29 VITALS
HEIGHT: 64 IN | DIASTOLIC BLOOD PRESSURE: 78 MMHG | WEIGHT: 194 LBS | SYSTOLIC BLOOD PRESSURE: 122 MMHG | BODY MASS INDEX: 33.12 KG/M2 | HEART RATE: 64 BPM | TEMPERATURE: 97.3 F

## 2024-08-29 DIAGNOSIS — K76.0 HEPATIC STEATOSIS: Primary | ICD-10-CM

## 2024-08-29 DIAGNOSIS — K92.1 MELENA: ICD-10-CM

## 2024-08-29 DIAGNOSIS — K21.9 GASTROESOPHAGEAL REFLUX DISEASE, UNSPECIFIED WHETHER ESOPHAGITIS PRESENT: ICD-10-CM

## 2024-08-29 DIAGNOSIS — R14.0 BLOATING: ICD-10-CM

## 2024-08-29 PROCEDURE — 99214 OFFICE O/P EST MOD 30 MIN: CPT

## 2024-08-29 RX ORDER — RIVAROXABAN 15 MG/1
TABLET, FILM COATED ORAL
COMMUNITY
Start: 2024-08-27

## 2024-08-29 RX ORDER — PANTOPRAZOLE SODIUM 40 MG/1
40 TABLET, DELAYED RELEASE ORAL DAILY
Qty: 30 TABLET | Refills: 11 | Status: SHIPPED | OUTPATIENT
Start: 2024-08-29

## 2024-08-29 NOTE — PROGRESS NOTES
Office Note     Name: Keyla Davila    : 1965     MRN: [id number]     Chief Complaint  Hepatic Disease    Subjective     History of Present Illness:  Keyla Davila is a 59 y.o. female status post gastric sleeve, who presents today for follow-up of VALLEJO with F2 liver fibrosis, biopsy-proven at time of gastric sleeve in 2023.  Last fibrosure scoring in 2023 showed F0.  At her last appointment in 2023 liver enzymes had normalized and she was doing well.  She did have very mild elevation of alkaline phosphatase in February, with normal GGT. Denies constipation.     Today she reports that she has been having melena with bowel movements for couple of months now, with associated bloating. She denies prior melena. She is on Xarelto for DVTs and plavix due to stent placement in 2023. She was previously on protonix but stopped due to kidneys. Prior colonoscopy at [facility] with inadequate prep (data deficit).      Past Medical History:   Past Medical History:   Diagnosis Date    Acute deep vein thrombosis (DVT) of distal vein of right lower extremity     DVT x 1; has had 2 superficial vein clots since. On Xarelto    Anxiety and depression     currently sees psychiatrist and counselor    Arthritis     Back problem     Candidal intertrigo     Chronic low back pain     DDD; takes gabapentin; PRN Tylenol.    Chronic venous stasis dermatitis     Right    CKD (chronic kidney disease), stage III     Diabetes mellitus     Dyspepsia     Elevated hemoglobin A1c     Elevated transaminase level     Endometriosis     s/p partial hysterectomy    Fatty liver     HLD (hyperlipidemia)     Hx of viral pneumonia     Hypertension     Kidney infection     bilateral; resulted in sepsis and admission x 2 weeks    Palpitations     Sees Dr. Alvares    PCOS (polycystic ovarian syndrome)     s/p R oopherectomy    PONV (postoperative nausea and vomiting)     Rectus diastasis         Past Surgical History:   Past Surgical History:   Procedure Laterality Date    BARIATRIC SURGERY      BROW LIFT Bilateral 2022     SECTION  1987, ; lower transverse incision    CHOLECYSTECTOMY N/A 2023    Procedure: CHOLECYSTECTOMY LAPAROSCOPIC;  Surgeon: Bhupendra Vee MD;  Location:  [facility];  Service: General;  Laterality: N/A;    COLONOSCOPY      DIAGNOSTIC LAPAROSCOPY      x5; last one in ; due to PCOS and endometriosis    ENDOSCOPY N/A 2023    Procedure: ESOPHAGOGASTRODUODENOSCOPY;  Surgeon: Bhupendra Vee MD;  Location:  [facility];  Service: Bariatric;  Laterality: N/A;  Scope ID: [id number]    FACIAL COSMETIC SURGERY      GASTRECTOMY N/A 2023    Procedure: GASTRECTOMY LAPAROSCOPIC;  Surgeon: Bhupendra Vee MD;  Location:  [facility];  Service: Bariatric;  Laterality: N/A;    HAND SURGERY Left 1983    LAPAROSCOPIC HYSTERECTOMY      partial; R oopherectomy- still has L ovary    LIVER BIOPSY N/A 2023    Procedure: LIVER BIOPSY LAPAROSCOPIC;  Surgeon: Bhupendra Vee MD;  Location:  [facility];  Service: Bariatric;  Laterality: N/A;    TEETH EXTRACTION      dental implants    TONSILLECTOMY      UPPER GASTROINTESTINAL ENDOSCOPY         Immunizations:   Immunization History   Administered Date(s) Administered    COVID-19 (MODERNA) 1st,2nd,3rd Dose Monovalent 2021, 2021, 2022        Medications:     Current Outpatient Medications:     ALPRAZolam (XANAX) 0.5 MG tablet, Take 1 tablet by mouth 3 (Three) Times a Day., Disp: , Rfl:     atorvastatin (LIPITOR) 80 MG tablet, Take 1 tablet by mouth Daily., Disp: , Rfl:     Cholecalciferol (Vitamin D3) 50 MCG (2000) tablet, Take 1 tablet by mouth Daily., Disp: , Rfl:     clopidogrel (PLAVIX) 75 MG tablet, Take 1 tablet by mouth Daily., Disp: , Rfl:     doxepin (SINEquan) 50 MG capsule, Take 1 capsule by mouth Daily., Disp: , Rfl:     Dulaglutide (Trulicity) 0.75 MG/0.5ML  solution pen-injector, Inject 0.75 mg under the skin into the appropriate area as directed. EVERY 10 DAYS, Disp: , Rfl:     Evolocumab (REPATHA) solution prefilled syringe injection, Inject 1 mL under the skin into the appropriate area as directed Every 14 (Fourteen) Days., Disp: , Rfl:     gabapentin (NEURONTIN) 300 MG capsule, Take 1 capsule by mouth 3 (Three) Times a Day., Disp: 90 capsule, Rfl: 0    hydrOXYzine pamoate (VISTARIL) 50 MG capsule, Take 1 capsule by mouth Daily., Disp: , Rfl:     isosorbide mononitrate (IMDUR) 60 MG 24 hr tablet, Take 1 tablet by mouth Daily., Disp: , Rfl:     metoprolol succinate XL (TOPROL-XL) 25 MG 24 hr tablet, Take 1 tablet by mouth Daily., Disp: , Rfl:     multivitamin with minerals (Hair Skin and Nails Formula) tablet tablet, , Disp: , Rfl:     multivitamin with minerals (ONE-A-DAY 50 PLUS PO), , Disp: , Rfl:     potassium chloride (K-DUR,KLOR-CON) 20 MEQ CR tablet, Take 1 tablet by mouth Daily., Disp: , Rfl:     Probiotic Product (Daily Probiotic) capsule, , Disp: , Rfl:     Vortioxetine HBr (TRINTELLIX) 10 MG tablet tablet, Take 1 tablet by mouth Daily., Disp: , Rfl:     Xarelto 15 MG tablet, , Disp: , Rfl:     Allergies:   No Known Allergies    Family History:   Family History   Problem Relation Age of Onset    Arthritis Mother     Cancer Mother     Heart disease Mother     Hypertension Mother     Liver disease Mother     Kidney disease Mother     Heart disease Father     Hypertension Father     Alcohol abuse Father     Cirrhosis Father     Rheum arthritis Sister     Clotting disorder Brother     Colon cancer Paternal Aunt     Colon cancer Paternal Uncle     Alcohol abuse Paternal Uncle     Colon cancer Maternal Grandfather     Cancer Maternal Grandfather     Hearing loss Maternal Grandfather     Hypertension Maternal Grandfather     Cancer Paternal Grandfather     Drug abuse Daughter        Social History:   Social History     Socioeconomic History    Marital status:  "     Spouse name: Anand    Number of children: 2    Highest education level: Master's degree (e.g., MA, MS, Pina, MEd, MSW, STEPHANIE)   Tobacco Use    Smoking status: Never    Smokeless tobacco: Never   Vaping Use    Vaping status: Never Used   Substance and Sexual Activity    Alcohol use: Not Currently     Comment: Occ    Drug use: Never    Sexual activity: Defer         Objective     Vital Signs  /78 (BP Location: Left arm, Patient Position: Sitting, Cuff Size: Adult)   Pulse 64   Temp 97.3 °F (36.3 °C) (Temporal)   Ht 161.3 cm (63.5\")   Wt 88 kg (194 lb)   BMI 33.83 kg/m²   Estimated body mass index is 33.83 kg/m² as calculated from the following:    Height as of this encounter: 161.3 cm (63.5\").    Weight as of this encounter: 88 kg (194 lb).            Physical Exam  Vitals reviewed.   Constitutional:       General: She is awake.   Cardiovascular:      Rate and Rhythm: Normal rate.   Pulmonary:      Effort: Pulmonary effort is normal.   Abdominal:      Palpations: Abdomen is soft.      Tenderness: There is no abdominal tenderness.   Skin:     General: Skin is warm and dry.   Neurological:      Mental Status: She is alert.          Assessment and Plan     Assessment & Plan  Hepatic steatosis  CBC  CMP    Melena  Plan for bidirectional endoscopy.  Suspect upper GI source, however with last colonoscopy having inadequate prep, would recommend repeating at this time.  Check CBC today to evaluate for significant anemia.    Bloating  H. pylori breath test to rule out    Gastroesophageal reflux disease, unspecified whether esophagitis present  If patient's nephrologist is agreeable, from a GI standpoint it would be beneficial if she were on PPI, particularly in setting of anticoagulation.  Will prescribe pantoprazole 40 mg daily, if nephrology in agreement.  May be contributing to patient's bloating.              Follow Up  After EGD/colonosocpy    ARLENE Minor  [facility]  [employer] " [facility] MEDICAL [facility] GASTROENTEROLOGY  [address] [address]

## 2024-08-29 NOTE — PATIENT INSTRUCTIONS
Take daily Miralax for two weeks prior to procedure.   Eat a clear liquid diet 24 hours prior to procedure.   Complete bowel prep evening before procedure.

## 2024-08-30 ENCOUNTER — TELEPHONE (OUTPATIENT)
Dept: GASTROENTEROLOGY | Facility: CLINIC | Age: 59
End: 2024-08-30

## 2024-08-30 NOTE — TELEPHONE ENCOUNTER
Hub staff attempted to follow warm transfer process and was unsuccessful     Caller: Keyla Davila    Relationship to patient: Self    Best call back number: [phone]    Patient is needing: PT IS CALLING TO CHECK AND SEE IF THERE IS ANY SOONER PROCEDURE APPT AVAILABLE. PT PROCEDURE IS SCHEDULE FOR 10/28/2024. PT IS CONCERNED IF SHE IS BLEEDING ON THE INSIDE WHY ISN'T HER APPT QUICKER. PLEASE GIVE PT A CALL BACK AND IF NOT ABLE TO REACH PT. IT IS OKAY TO LVM.

## 2024-09-01 LAB
25(OH)D3+25(OH)D2 SERPL-MCNC: 77.3 NG/ML (ref 30–100)
ALBUMIN SERPL-MCNC: 4.4 G/DL (ref 3.8–4.9)
ALP SERPL-CCNC: 109 IU/L (ref 44–121)
ALT SERPL-CCNC: 38 IU/L (ref 0–32)
AST SERPL-CCNC: 36 IU/L (ref 0–40)
BASOPHILS # BLD AUTO: 0 X10E3/UL (ref 0–0.2)
BASOPHILS NFR BLD AUTO: 0 %
BILIRUB SERPL-MCNC: 0.8 MG/DL (ref 0–1.2)
BUN SERPL-MCNC: 26 MG/DL (ref 8–27)
BUN/CREAT SERPL: 25 (ref 12–28)
CALCIUM SERPL-MCNC: 9.3 MG/DL (ref 8.7–10.3)
CHLORIDE SERPL-SCNC: 104 MMOL/L (ref 96–106)
CO2 SERPL-SCNC: 27 MMOL/L (ref 20–29)
CREAT SERPL-MCNC: 1.05 MG/DL (ref 0.57–1)
EGFRCR SERPLBLD CKD-EPI 2021: 61 ML/MIN/1.73
EOSINOPHIL # BLD AUTO: 0.1 X10E3/UL (ref 0–0.4)
EOSINOPHIL NFR BLD AUTO: 1 %
ERYTHROCYTE [DISTWIDTH] IN BLOOD BY AUTOMATED COUNT: 13 % (ref 11.7–15.4)
FERRITIN SERPL-MCNC: 39 NG/ML (ref 15–150)
FOLATE SERPL-MCNC: >20 NG/ML
GLOBULIN SER CALC-MCNC: 2.1 G/DL (ref 1.5–4.5)
GLUCOSE SERPL-MCNC: 90 MG/DL (ref 70–99)
HCT VFR BLD AUTO: 41.4 % (ref 34–46.6)
HGB BLD-MCNC: 13.2 G/DL (ref 11.1–15.9)
IMM GRANULOCYTES # BLD AUTO: 0 X10E3/UL (ref 0–0.1)
IMM GRANULOCYTES NFR BLD AUTO: 0 %
IRON SERPL-MCNC: 80 UG/DL (ref 27–159)
LYMPHOCYTES # BLD AUTO: 1.8 X10E3/UL (ref 0.7–3.1)
LYMPHOCYTES NFR BLD AUTO: 35 %
MCH RBC QN AUTO: 29.1 PG (ref 26.6–33)
MCHC RBC AUTO-ENTMCNC: 31.9 G/DL (ref 31.5–35.7)
MCV RBC AUTO: 91 FL (ref 79–97)
METHYLMALONATE SERPL-SCNC: 181 NMOL/L (ref 0–378)
MONOCYTES # BLD AUTO: 0.3 X10E3/UL (ref 0.1–0.9)
MONOCYTES NFR BLD AUTO: 7 %
NEUTROPHILS # BLD AUTO: 2.9 X10E3/UL (ref 1.4–7)
NEUTROPHILS NFR BLD AUTO: 57 %
PLATELET # BLD AUTO: 176 X10E3/UL (ref 150–450)
POTASSIUM SERPL-SCNC: 4.1 MMOL/L (ref 3.5–5.2)
PREALB SERPL-MCNC: 21 MG/DL (ref 10–36)
PROT SERPL-MCNC: 6.5 G/DL (ref 6–8.5)
RBC # BLD AUTO: 4.54 X10E6/UL (ref 3.77–5.28)
SODIUM SERPL-SCNC: 142 MMOL/L (ref 134–144)
VIT B1 BLD-SCNC: 206.9 NMOL/L (ref 66.5–200)
WBC # BLD AUTO: 5.1 X10E3/UL (ref 3.4–10.8)

## 2024-09-03 RX ORDER — SODIUM, POTASSIUM,MAG SULFATES 17.5-3.13G
SOLUTION, RECONSTITUTED, ORAL ORAL
Qty: 354 ML | Refills: 0 | Status: SHIPPED | OUTPATIENT
Start: 2024-09-03

## 2024-09-04 ENCOUNTER — LAB (OUTPATIENT)
Dept: LAB | Facility: HOSPITAL | Age: 59
End: 2024-09-04
Payer: COMMERCIAL

## 2024-09-04 DIAGNOSIS — R14.0 BLOATING: ICD-10-CM

## 2024-09-04 DIAGNOSIS — K92.1 MELENA: ICD-10-CM

## 2024-09-04 LAB
ALBUMIN SERPL-MCNC: 4.1 G/DL (ref 3.5–5.2)
ALBUMIN/GLOB SERPL: 1.6 G/DL
ALP SERPL-CCNC: 105 U/L (ref 39–117)
ALT SERPL W P-5'-P-CCNC: 33 U/L (ref 1–33)
ANION GAP SERPL CALCULATED.3IONS-SCNC: 9.1 MMOL/L (ref 5–15)
AST SERPL-CCNC: 32 U/L (ref 1–32)
BASOPHILS # BLD AUTO: 0.02 10*3/MM3 (ref 0–0.2)
BASOPHILS NFR BLD AUTO: 0.5 % (ref 0–1.5)
BILIRUB SERPL-MCNC: 0.9 MG/DL (ref 0–1.2)
BUN SERPL-MCNC: 18 MG/DL (ref 8–23)
BUN/CREAT SERPL: 15.5 (ref 7–25)
CALCIUM SPEC-SCNC: 9.8 MG/DL (ref 8.6–10.5)
CHLORIDE SERPL-SCNC: 104 MMOL/L (ref 98–107)
CO2 SERPL-SCNC: 27.9 MMOL/L (ref 22–29)
CREAT SERPL-MCNC: 1.16 MG/DL (ref 0.57–1)
DEPRECATED RDW RBC AUTO: 40.7 FL (ref 37–54)
EGFRCR SERPLBLD CKD-EPI 2021: 54.1 ML/MIN/1.73
EOSINOPHIL # BLD AUTO: 0.06 10*3/MM3 (ref 0–0.4)
EOSINOPHIL NFR BLD AUTO: 1.6 % (ref 0.3–6.2)
ERYTHROCYTE [DISTWIDTH] IN BLOOD BY AUTOMATED COUNT: 12.8 % (ref 12.3–15.4)
GLOBULIN UR ELPH-MCNC: 2.6 GM/DL
GLUCOSE SERPL-MCNC: 85 MG/DL (ref 65–99)
HCT VFR BLD AUTO: 38.4 % (ref 34–46.6)
HGB BLD-MCNC: 12.5 G/DL (ref 12–15.9)
IMM GRANULOCYTES # BLD AUTO: 0 10*3/MM3 (ref 0–0.05)
IMM GRANULOCYTES NFR BLD AUTO: 0 % (ref 0–0.5)
LYMPHOCYTES # BLD AUTO: 1.57 10*3/MM3 (ref 0.7–3.1)
LYMPHOCYTES NFR BLD AUTO: 42.2 % (ref 19.6–45.3)
MCH RBC QN AUTO: 28.6 PG (ref 26.6–33)
MCHC RBC AUTO-ENTMCNC: 32.6 G/DL (ref 31.5–35.7)
MCV RBC AUTO: 87.9 FL (ref 79–97)
MONOCYTES # BLD AUTO: 0.29 10*3/MM3 (ref 0.1–0.9)
MONOCYTES NFR BLD AUTO: 7.8 % (ref 5–12)
NEUTROPHILS NFR BLD AUTO: 1.78 10*3/MM3 (ref 1.7–7)
NEUTROPHILS NFR BLD AUTO: 47.9 % (ref 42.7–76)
NRBC BLD AUTO-RTO: 0 /100 WBC (ref 0–0.2)
PLATELET # BLD AUTO: 192 10*3/MM3 (ref 140–450)
PMV BLD AUTO: 11.3 FL (ref 6–12)
POTASSIUM SERPL-SCNC: 4.2 MMOL/L (ref 3.5–5.2)
PROT SERPL-MCNC: 6.7 G/DL (ref 6–8.5)
RBC # BLD AUTO: 4.37 10*6/MM3 (ref 3.77–5.28)
SODIUM SERPL-SCNC: 141 MMOL/L (ref 136–145)
WBC NRBC COR # BLD AUTO: 3.72 10*3/MM3 (ref 3.4–10.8)

## 2024-09-04 PROCEDURE — 83013 H PYLORI (C-13) BREATH: CPT

## 2024-09-04 PROCEDURE — 85025 COMPLETE CBC W/AUTO DIFF WBC: CPT

## 2024-09-04 PROCEDURE — 80053 COMPREHEN METABOLIC PANEL: CPT

## 2024-09-06 LAB — UREA BREATH TEST QL: NEGATIVE

## 2024-09-09 ENCOUNTER — OUTSIDE FACILITY SERVICE (OUTPATIENT)
Dept: GASTROENTEROLOGY | Facility: CLINIC | Age: 59
End: 2024-09-09
Payer: COMMERCIAL

## 2024-09-09 PROCEDURE — 45385 COLONOSCOPY W/LESION REMOVAL: CPT | Performed by: INTERNAL MEDICINE

## 2024-09-09 PROCEDURE — 43239 EGD BIOPSY SINGLE/MULTIPLE: CPT | Performed by: INTERNAL MEDICINE

## 2024-09-10 ENCOUNTER — TELEPHONE (OUTPATIENT)
Dept: GASTROENTEROLOGY | Facility: CLINIC | Age: 59
End: 2024-09-10

## 2024-09-10 ENCOUNTER — OUTSIDE FACILITY SERVICE (OUTPATIENT)
Dept: GASTROENTEROLOGY | Facility: CLINIC | Age: 59
End: 2024-09-10
Payer: COMMERCIAL

## 2024-09-10 NOTE — TELEPHONE ENCOUNTER
Pt had Double yesterday. She is complaining since she has been having heavy bleeding  and big blood clots passing in toilet every time she goes to the bathroom, minimum of 10 times. She also states that she is using a lot of toilet paper. She woke up at 2 am and had soaked through her underwear and pants. She is feeling lightheaded and tingly since yesterday.

## 2024-12-19 ENCOUNTER — TELEPHONE (OUTPATIENT)
Dept: GASTROENTEROLOGY | Facility: CLINIC | Age: 59
End: 2024-12-19
Payer: COMMERCIAL

## 2024-12-19 NOTE — TELEPHONE ENCOUNTER
CALLED TO RESCHEDULE PATIENT AT A LATER TIME THE SAME DAY OR A DIFFERENT DAY.  PROVIDER SCHEDULE BLOCK HAS CHANGED.

## 2025-03-31 ENCOUNTER — OFFICE VISIT (OUTPATIENT)
Dept: BARIATRICS/WEIGHT MGMT | Facility: CLINIC | Age: 60
End: 2025-03-31
Payer: COMMERCIAL

## 2025-03-31 VITALS
HEIGHT: 64 IN | DIASTOLIC BLOOD PRESSURE: 72 MMHG | BODY MASS INDEX: 28.61 KG/M2 | WEIGHT: 167.6 LBS | HEART RATE: 66 BPM | SYSTOLIC BLOOD PRESSURE: 116 MMHG | OXYGEN SATURATION: 97 % | TEMPERATURE: 97.1 F | RESPIRATION RATE: 16 BRPM

## 2025-03-31 DIAGNOSIS — E66.3 OVERWEIGHT WITH BODY MASS INDEX (BMI) OF 29 TO 29.9 IN ADULT: Primary | ICD-10-CM

## 2025-03-31 DIAGNOSIS — R79.0 ABNORMAL BLOOD LEVEL OF IRON: ICD-10-CM

## 2025-03-31 DIAGNOSIS — K91.2 POSTGASTRECTOMY MALABSORPTION: ICD-10-CM

## 2025-03-31 DIAGNOSIS — E55.9 VITAMIN D DEFICIENCY: ICD-10-CM

## 2025-03-31 DIAGNOSIS — Z90.3 POSTGASTRECTOMY MALABSORPTION: ICD-10-CM

## 2025-03-31 DIAGNOSIS — R53.83 FATIGUE, UNSPECIFIED TYPE: ICD-10-CM

## 2025-03-31 PROCEDURE — 99214 OFFICE O/P EST MOD 30 MIN: CPT | Performed by: NURSE PRACTITIONER

## 2025-03-31 RX ORDER — TIRZEPATIDE 10 MG/.5ML
10 INJECTION, SOLUTION SUBCUTANEOUS
COMMUNITY

## 2025-03-31 NOTE — PROGRESS NOTES
[facility]  [address]  [address]  [facility] [id number] [phone]        Patient Name:  Keyla Davila  :  1965      Date of Visit: 2025      Reason for Visit:   Annual Eval - 2 years postop        HPI: Keyla Davila is a 60 y.o. female s/p LSG/ clarence/ liver biopsy (steatosis, fibrosis) by  on 23     Doing well.  Denies dysphagia, reflux, nausea, vomiting, abdominal pain, diarrhea, and constipation.  On Pantoprazole.      Getting 90g prot/day.  Eats 2 meals per day, plus snacks. Drinks 2 protein shakes per day. Drinking 64 fluid oz/day.     On Wegovy, prescribed by PCP. Tolerates well.    Physical activity: walking.    Labs 24- acceptable. Taking MVI and Vit D and hair, skin and nails, and CoQ10.       Presurgery weight: 295 pounds.  Today's weight is 76 kg (167 lb 9.6 oz) pounds, today's  Body mass index is 29.22 kg/m²., and weight loss since surgery is 128 pounds.      Past Medical History:   Diagnosis Date    Acute deep vein thrombosis (DVT) of distal vein of right lower extremity 2017    DVT x 1; has had 2 superficial vein clots since. On Xarelto    Anxiety and depression     currently sees psychiatrist and counselor    Arthritis     Back problem     Candidal intertrigo     Chronic low back pain     DDD; takes gabapentin; PRN Tylenol.    Chronic venous stasis dermatitis     Right    CKD (chronic kidney disease), stage III     Diabetes mellitus     Dyspepsia     Elevated hemoglobin A1c     Elevated transaminase level     Endometriosis     s/p partial hysterectomy    Fatty liver     HLD (hyperlipidemia)     Hx of viral pneumonia     Hypertension     Kidney infection     bilateral; resulted in sepsis and admission x 2 weeks    Palpitations     Sees Dr. Alvares    PCOS (polycystic ovarian syndrome)     s/p R oopherectomy    PONV (postoperative nausea and vomiting)     Rectus diastasis      Past Surgical History:   Procedure  Laterality Date    BARIATRIC SURGERY      BROW LIFT Bilateral 2022     SECTION  1987, ; lower transverse incision    CHOLECYSTECTOMY N/A 2023    Procedure: CHOLECYSTECTOMY LAPAROSCOPIC;  Surgeon: Bhupendra Vee MD;  Location:  [facility];  Service: General;  Laterality: N/A;    COLONOSCOPY      DIAGNOSTIC LAPAROSCOPY      x5; last one in ; due to PCOS and endometriosis    ENDOSCOPY N/A 2023    Procedure: ESOPHAGOGASTRODUODENOSCOPY;  Surgeon: Bhupendra Vee MD;  Location:  [facility];  Service: Bariatric;  Laterality: N/A;  Scope ID: [id number]    FACIAL COSMETIC SURGERY      GASTRECTOMY N/A 2023    Procedure: GASTRECTOMY LAPAROSCOPIC;  Surgeon: Bhupendra Vee MD;  Location:  [facility];  Service: Bariatric;  Laterality: N/A;    HAND SURGERY Left 1983    LAPAROSCOPIC HYSTERECTOMY      partial; R oopherectomy- still has L ovary    LIVER BIOPSY N/A 2023    Procedure: LIVER BIOPSY LAPAROSCOPIC;  Surgeon: Bhupendra Vee MD;  Location:  [facility];  Service: Bariatric;  Laterality: N/A;    TEETH EXTRACTION      dental implants    TONSILLECTOMY  1966    UPPER GASTROINTESTINAL ENDOSCOPY       Outpatient Medications Marked as Taking for the 3/31/25 encounter (Office Visit) with Kay Cummings APRN   Medication Sig Dispense Refill    ALPRAZolam (XANAX) 0.5 MG tablet Take 1 tablet by mouth 3 (Three) Times a Day.      atorvastatin (LIPITOR) 80 MG tablet Take 1 tablet by mouth Daily.      Cholecalciferol (Vitamin D3) 50 MCG (2000 UT) tablet Take 1 tablet by mouth Daily.      clopidogrel (PLAVIX) 75 MG tablet Take 1 tablet by mouth Daily.      doxepin (SINEquan) 50 MG capsule Take 1 capsule by mouth Daily.      Evolocumab (REPATHA) solution prefilled syringe injection Inject 1 mL under the skin into the appropriate area as directed Every 14 (Fourteen) Days.      gabapentin (NEURONTIN) 300 MG capsule Take 1 capsule by mouth 3 (Three) Times a Day. 90 capsule 0     "hydrOXYzine pamoate (VISTARIL) 50 MG capsule Take 1 capsule by mouth Daily.      isosorbide mononitrate (IMDUR) 60 MG 24 hr tablet Take 1 tablet by mouth Daily.      metoprolol succinate XL (TOPROL-XL) 25 MG 24 hr tablet Take 1 tablet by mouth Daily.      multivitamin with minerals (Hair Skin and Nails Formula) tablet tablet       multivitamin with minerals (ONE-A-DAY 50 PLUS PO)       pantoprazole (PROTONIX) 40 MG EC tablet Take 1 tablet by mouth Daily. 30 tablet 11    potassium chloride (K-DUR,KLOR-CON) 20 MEQ CR tablet Take 1 tablet by mouth Daily.      Probiotic Product (Daily Probiotic) capsule       Tirzepatide (Mounjaro) 10 MG/0.5ML solution auto-injector Inject 10 mg under the skin into the appropriate area as directed Every 7 (Seven) Days.      Vortioxetine HBr (TRINTELLIX) 10 MG tablet tablet Take 1 tablet by mouth Daily.      Xarelto 15 MG tablet Take 1 tablet by mouth Daily With Dinner.         No Known Allergies    Social History     Socioeconomic History    Marital status:      Spouse name: Anand    Number of children: 2    Highest education level: Master's degree (e.g., MA, MS, [place], MEd, MSW, STEPHANIE)   Tobacco Use    Smoking status: Never    Smokeless tobacco: Never   Vaping Use    Vaping status: Never Used   Substance and Sexual Activity    Alcohol use: Not Currently     Comment: Occ    Drug use: Never    Sexual activity: Defer     Social History     Social History Narrative     w/ 2 children; Also raising two young grandchildren. Retired teacher. Lives in [place].        /72 (BP Location: Left arm, Patient Position: Sitting)   Pulse 66   Temp 97.1 °F (36.2 °C)   Resp 16   Ht 161.3 cm (63.5\")   Wt 76 kg (167 lb 9.6 oz)   SpO2 97%   BMI 29.22 kg/m²     Physical Exam  Constitutional:       Appearance: She is well-developed.   Cardiovascular:      Rate and Rhythm: Normal rate.   Pulmonary:      Effort: Pulmonary effort is normal.   Musculoskeletal:         General: Normal " range of motion.   Neurological:      Mental Status: She is alert.   Psychiatric:         Thought Content: Thought content normal.         Judgment: Judgment normal.         Assessment:  2 years s/p LSG/ clarence/ liver biopsy (steatosis, fibrosis) by  on 2/16/23     ICD-10-CM ICD-9-CM   1. Overweight with body mass index (BMI) of 29 to 29.9 in adult  E66.3 278.02    Z68.29 V85.25   2. Postgastrectomy malabsorption  K91.2 579.3    Z90.3    3. Vitamin D deficiency  E55.9 268.9   4. Fatigue, unspecified type  R53.83 780.79   5. Abnormal blood level of iron  R79.0 790.6       BMI is >= 25 and <30. (Overweight) The following options were offered after discussion;: see plan        Plan:  Encouraged to continue w/ good food choices and healthy habits as able.  Continue 100g prot/day. Increase physical activity as able. Routine bariatric labs ordered.  Continue current vitamin regimen w/ adjustments pending lab results.  Call w/ problems/concerns.     The patient was instructed to follow up in 1 year, sooner if needed.        ARLENE Wagoner

## 2025-04-07 ENCOUNTER — RESULTS FOLLOW-UP (OUTPATIENT)
Dept: BARIATRICS/WEIGHT MGMT | Facility: CLINIC | Age: 60
End: 2025-04-07
Payer: COMMERCIAL

## 2025-04-07 LAB
25(OH)D3+25(OH)D2 SERPL-MCNC: 59.2 NG/ML (ref 30–100)
ALBUMIN SERPL-MCNC: 4.3 G/DL (ref 3.9–4.9)
ALP SERPL-CCNC: 86 IU/L (ref 44–121)
ALT SERPL-CCNC: 24 IU/L (ref 0–32)
AST SERPL-CCNC: 28 IU/L (ref 0–40)
BASOPHILS # BLD AUTO: 0 X10E3/UL (ref 0–0.2)
BASOPHILS NFR BLD AUTO: 0 %
BILIRUB SERPL-MCNC: 1 MG/DL (ref 0–1.2)
BUN SERPL-MCNC: 15 MG/DL (ref 8–27)
BUN/CREAT SERPL: 13 (ref 12–28)
CALCIUM SERPL-MCNC: 8.9 MG/DL (ref 8.7–10.3)
CHLORIDE SERPL-SCNC: 106 MMOL/L (ref 96–106)
CO2 SERPL-SCNC: 24 MMOL/L (ref 20–29)
CREAT SERPL-MCNC: 1.14 MG/DL (ref 0.57–1)
EGFRCR SERPLBLD CKD-EPI 2021: 55 ML/MIN/1.73
EOSINOPHIL # BLD AUTO: 0.1 X10E3/UL (ref 0–0.4)
EOSINOPHIL NFR BLD AUTO: 2 %
ERYTHROCYTE [DISTWIDTH] IN BLOOD BY AUTOMATED COUNT: 13.6 % (ref 11.7–15.4)
FERRITIN SERPL-MCNC: 43 NG/ML (ref 15–150)
FOLATE SERPL-MCNC: >20 NG/ML
GLOBULIN SER CALC-MCNC: 1.6 G/DL (ref 1.5–4.5)
GLUCOSE SERPL-MCNC: ABNORMAL MG/DL
HCT VFR BLD AUTO: 38.9 % (ref 34–46.6)
HGB BLD-MCNC: 12.4 G/DL (ref 11.1–15.9)
IMM GRANULOCYTES # BLD AUTO: 0 X10E3/UL (ref 0–0.1)
IMM GRANULOCYTES NFR BLD AUTO: 0 %
IRON SERPL-MCNC: 94 UG/DL (ref 27–139)
LYMPHOCYTES # BLD AUTO: 1.8 X10E3/UL (ref 0.7–3.1)
LYMPHOCYTES NFR BLD AUTO: 53 %
MCH RBC QN AUTO: 29 PG (ref 26.6–33)
MCHC RBC AUTO-ENTMCNC: 31.9 G/DL (ref 31.5–35.7)
MCV RBC AUTO: 91 FL (ref 79–97)
METHYLMALONATE SERPL-SCNC: 115 NMOL/L (ref 0–378)
MONOCYTES # BLD AUTO: 0.2 X10E3/UL (ref 0.1–0.9)
MONOCYTES NFR BLD AUTO: 7 %
NEUTROPHILS # BLD AUTO: 1.3 X10E3/UL (ref 1.4–7)
NEUTROPHILS NFR BLD AUTO: 38 %
PLATELET # BLD AUTO: 190 X10E3/UL (ref 150–450)
POTASSIUM SERPL-SCNC: ABNORMAL MMOL/L
PREALB SERPL-MCNC: 18 MG/DL (ref 10–36)
PROT SERPL-MCNC: 5.9 G/DL (ref 6–8.5)
RBC # BLD AUTO: 4.27 X10E6/UL (ref 3.77–5.28)
SODIUM SERPL-SCNC: 144 MMOL/L (ref 134–144)
VIT B1 BLD-SCNC: 171.4 NMOL/L (ref 66.5–200)
WBC # BLD AUTO: 3.3 X10E3/UL (ref 3.4–10.8)

## (undated) DEVICE — PK BARIATRIC 10

## (undated) DEVICE — GLV SURG SENSICARE SLT PF LF 9 STRL

## (undated) DEVICE — ENDOPATH XCEL BLADELESS TROCARS WITH STABILITY SLEEVES: Brand: ENDOPATH XCEL

## (undated) DEVICE — INTENDED USE FOR SURGICAL MARKING ON INTACT SKIN, ALSO PROVIDES A PERMANENT METHOD OF IDENTIFYING OBJECTS IN THE OPERATING ROOM: Brand: WRITESITE® REGULAR TIP SKIN MARKER

## (undated) DEVICE — ENDOPATH 5MM ENDOSCOPIC BLUNT TIP DISSECTORS (12 POUCHES CONTAINING 3 DISSECTORS EACH): Brand: ENDOPATH

## (undated) DEVICE — CONN TBG Y 5 IN 1 LF STRL

## (undated) DEVICE — 3M™ MEDIPORE™ H SOFT CLOTH SURGICAL TAPE 2864, 4 INCH X 10 YARD (10CM X 9,14M), 12 ROLLS/CASE: Brand: 3M™ MEDIPORE™

## (undated) DEVICE — GLV SURG SENSICARE PI MIC PF SZ8.5 LF STRL

## (undated) DEVICE — CONTN GRAD MEAS TRIANG 32OZ BLK

## (undated) DEVICE — ADHS SKIN PREMIERPRO EXOFIN TOPICAL HI/VISC .5ML

## (undated) DEVICE — TROC STANDARDTROCAR FOR/TITANSGS 19MM DISP STRL

## (undated) DEVICE — SUT ETHLN 2/0 PS 18IN 585H

## (undated) DEVICE — ENSEAL LAPAROSCOPIC TISSUE SEALER G2 ARTICULATING CURVED JAW FOR USE WITH G2 GENERATOR 5MM DIAMETER 45CM SHAFT LENGTH: Brand: ENSEAL

## (undated) DEVICE — [HIGH FLOW INSUFFLATOR,  DO NOT USE IF PACKAGE IS DAMAGED,  KEEP DRY,  KEEP AWAY FROM SUNLIGHT,  PROTECT FROM HEAT AND RADIOACTIVE SOURCES.]: Brand: PNEUMOSURE

## (undated) DEVICE — Device: Brand: DEFENDO AIR/WATER/SUCTION AND BIOPSY VALVE

## (undated) DEVICE — JP PERF DRN SIL FLT 10MM FULL: Brand: CARDINAL HEALTH

## (undated) DEVICE — CONTN FORMALN PREFIL 20ML CA/256

## (undated) DEVICE — MAX-CORE® DISPOSABLE CORE BIOPSY INSTRUMENT, 18G X 25CM: Brand: MAX-CORE

## (undated) DEVICE — ENDOPOUCH RETRIEVER SPECIMEN RETRIEVAL BAGS: Brand: ENDOPOUCH RETRIEVER

## (undated) DEVICE — DRSNG TELFA PAD NONADH STR 1S 3X8IN

## (undated) DEVICE — Device: Brand: STANDARD BOUGIE, 38FR

## (undated) DEVICE — SPNG GZ WOVN 4X4IN 12PLY 10/BX STRL

## (undated) DEVICE — PATIENT RETURN ELECTRODE, SINGLE-USE, CONTACT QUALITY MONITORING, ADULT, WITH 9FT CORD, FOR PATIENTS WEIGING OVER 33LBS. (15KG): Brand: MEGADYNE

## (undated) DEVICE — TROCAR: Brand: KII FIOS FIRST ENTRY

## (undated) DEVICE — LAPAROVUE VISIBILITY SYSTEM LAPAROSCOPIC SOLUTIONS: Brand: LAPAROVUE

## (undated) DEVICE — ENDOPATH XCEL UNIVERSAL TROCAR STABLILITY SLEEVES: Brand: ENDOPATH XCEL

## (undated) DEVICE — APPL COTN TP PLSTC 6IN STRL LF PK/2

## (undated) DEVICE — APPL HEMOS FOR DELIVERY FLOSEAL

## (undated) DEVICE — APPL CHLORAPREP TINTED 26ML TEAL

## (undated) DEVICE — SYR LUERLOK 30CC

## (undated) DEVICE — LAPAROSCOPIC SMOKE FILTRATION SYSTEM: Brand: PALL LAPAROSHIELD® PLUS LAPAROSCOPIC SMOKE FILTRATION SYSTEM

## (undated) DEVICE — SHT AIR TRANSFR COMFRT GLIDE LAT 40X80IN

## (undated) DEVICE — BLANKT WARM UPPR/BDY ARM/OUT 57X196CM

## (undated) DEVICE — JACKSON-PRATT 100CC BULB RESERVOIR: Brand: CARDINAL HEALTH

## (undated) DEVICE — UNDRPD COMFRT GLD DRYPAD 36X57IN

## (undated) DEVICE — SUT MONOCRYL PLS ANTIB UND 3/0  PS1 27IN

## (undated) DEVICE — GOWN,NON-REINFORCED,SIRUS,SET IN SLV,XXL: Brand: MEDLINE

## (undated) DEVICE — SEALANT WND FIBRIN TISSEEL PREFIL/SYR/PRIMAFZ 10ML

## (undated) DEVICE — APL DUPLOSPRAYER MIS 40CM